# Patient Record
Sex: FEMALE | Race: WHITE | Employment: OTHER | URBAN - METROPOLITAN AREA
[De-identification: names, ages, dates, MRNs, and addresses within clinical notes are randomized per-mention and may not be internally consistent; named-entity substitution may affect disease eponyms.]

---

## 2019-04-23 ENCOUNTER — OFFICE VISIT (OUTPATIENT)
Dept: INTERNAL MEDICINE CLINIC | Facility: CLINIC | Age: 59
End: 2019-04-23

## 2019-04-23 VITALS
HEART RATE: 78 BPM | HEIGHT: 66 IN | RESPIRATION RATE: 16 BRPM | DIASTOLIC BLOOD PRESSURE: 68 MMHG | WEIGHT: 111.9 LBS | BODY MASS INDEX: 17.98 KG/M2 | TEMPERATURE: 98.4 F | SYSTOLIC BLOOD PRESSURE: 99 MMHG

## 2019-04-23 DIAGNOSIS — F10.11 HISTORY OF ALCOHOL ABUSE: ICD-10-CM

## 2019-04-23 DIAGNOSIS — Z87.81 HISTORY OF FRACTURE OF RIGHT HIP: ICD-10-CM

## 2019-04-23 DIAGNOSIS — G25.71 AKATHISIA: ICD-10-CM

## 2019-04-23 DIAGNOSIS — Z86.718 HISTORY OF DVT (DEEP VEIN THROMBOSIS): Primary | ICD-10-CM

## 2019-04-23 DIAGNOSIS — F31.9 BIPOLAR 1 DISORDER (HCC): ICD-10-CM

## 2019-04-23 DIAGNOSIS — G25.2 RESTING TREMOR: ICD-10-CM

## 2019-04-23 DIAGNOSIS — I10 ESSENTIAL HYPERTENSION: ICD-10-CM

## 2019-04-23 RX ORDER — FAMOTIDINE 20 MG/1
20 TABLET, FILM COATED ORAL 2 TIMES DAILY
COMMUNITY
End: 2019-04-23

## 2019-04-23 RX ORDER — FOLIC ACID 1 MG/1
TABLET ORAL DAILY
COMMUNITY
End: 2019-04-25 | Stop reason: SDUPTHER

## 2019-04-23 RX ORDER — GABAPENTIN 100 MG/1
200 CAPSULE ORAL 3 TIMES DAILY
COMMUNITY
End: 2019-04-25 | Stop reason: SDUPTHER

## 2019-04-23 RX ORDER — CITALOPRAM 10 MG/1
TABLET ORAL DAILY
COMMUNITY

## 2019-04-23 RX ORDER — METOPROLOL TARTRATE 25 MG/1
TABLET, FILM COATED ORAL 2 TIMES DAILY
COMMUNITY
End: 2019-05-09 | Stop reason: SDUPTHER

## 2019-04-23 RX ORDER — CARBIDOPA AND LEVODOPA 10; 100 MG/1; MG/1
1 TABLET ORAL 3 TIMES DAILY
COMMUNITY
End: 2019-04-25 | Stop reason: SDUPTHER

## 2019-04-23 RX ORDER — ALPRAZOLAM 0.5 MG/1
TABLET ORAL 2 TIMES DAILY
COMMUNITY
End: 2019-08-15

## 2019-04-23 RX ORDER — METHOCARBAMOL 500 MG/1
TABLET, FILM COATED ORAL 2 TIMES DAILY
COMMUNITY
End: 2019-04-25 | Stop reason: SDUPTHER

## 2019-04-23 RX ORDER — BENZTROPINE MESYLATE 0.5 MG/1
0.5 TABLET ORAL
COMMUNITY
End: 2022-06-17 | Stop reason: ALTCHOICE

## 2019-04-23 RX ORDER — PRAZOSIN HYDROCHLORIDE 2 MG/1
2 CAPSULE ORAL
COMMUNITY
End: 2019-08-15

## 2019-04-23 RX ORDER — LAMOTRIGINE 25 MG/1
75 TABLET ORAL DAILY
COMMUNITY

## 2019-04-23 RX ORDER — PALIPERIDONE 1.5 MG/1
1.5 TABLET, EXTENDED RELEASE ORAL
COMMUNITY
End: 2020-10-19

## 2019-04-23 RX ORDER — LANOLIN ALCOHOL/MO/W.PET/CERES
CREAM (GRAM) TOPICAL DAILY
COMMUNITY
End: 2019-04-25 | Stop reason: SDUPTHER

## 2019-04-23 NOTE — PROGRESS NOTES
Subjective:  
  
Soco Valera is a 60 y.o. female who is a new patient and is here to establish care. Previous followed by [facility]. The following sections were reviewed & updated as appropriate: PMH, PL, PSH, FH, RxH, and SH. History of DVT, HTN. She was seeing the [address] [place] [address]. Broken hip: she broke her hip in January. She is seeing orthopedist [address] [place] [address] in May Dr. Kian Ruiz. She is taking xarelto 20mg. Parkinson like symptoms: she was put on benztropine and carbidopa-levodopa. She sees neurology Dr. Selene Newberry neurology May 13th. Alcohol abuse: she is taking vitamin B and folic acid. She states her last alcohol was a month ago. She has been started on prazosin for sleep issues. Shingles: she had nerve damage in her from shingles. She has been put on the gabapentin to control the nerve pain from this. Mental Health Review Patient is seen for bipolar. She sees  with [facility]. She is getting invega, celexa, lamictal, xanax from her. Patient Active Problem List  
 Diagnosis Date Noted  Bipolar 1 disorder ([facility] [address] 04/23/2019  
 History of fracture of right hip 04/23/2019  
 History of alcohol abuse 04/23/2019  Akathisia 04/23/2019  Resting tremor 04/23/2019  
 History of DVT (deep vein thrombosis) 04/23/2019  Drug-induced mood disorder(292.84) 06/14/2015  Shingles 06/14/2015  Hypertension 06/14/2015  Rosacea 06/24/2014  Alcohol dependence ([facility] [address] 06/23/2014  Personality disorder ([facility] [address] 06/23/2014  Non-compliance with treatment 06/23/2014 Current Outpatient Medications Medication Sig Dispense Refill  gabapentin (NEURONTIN) 100 mg capsule Take 200 mg by mouth three (3) times daily.  citalopram (CELEXA) 10 mg tablet Take  by mouth daily.  prazosin (MINIPRESS) 2 mg capsule Take 2 mg by mouth nightly.  carbidopa-levodopa (SINEMET)  mg per tablet Take 1 Tab by mouth three (3) times daily.  benztropine (COGENTIN) 0.5 mg tablet Take 0.5 mg by mouth nightly.  folic acid (FOLVITE) 1 mg tablet Take  by mouth daily.  methocarbamol (ROBAXIN) 500 mg tablet Take  by mouth two (2) times a day.  rivaroxaban (XARELTO) 20 mg tab tablet Take  by mouth daily.  metoprolol tartrate (LOPRESSOR) 25 mg tablet Take  by mouth two (2) times a day.  paliperidone (INVEGA) 1.5 mg ER tablet Take 3 mg by mouth nightly.  lamoTRIgine (LAMICTAL) 25 mg tablet Take  by mouth two (2) times a day.  thiamine HCL (VITAMIN B-1) 100 mg tablet Take  by mouth daily.  ALPRAZolam (XANAX) 0.5 mg tablet Take  by mouth two (2) times a day.  amitriptyline (ELAVIL) 25 mg tablet Take 1 Tab by mouth nightly for 15 days. Indications: NEUROPATHIC PAIN 15 Tab 0  
 hydrochlorothiazide (HYDRODIURIL) 25 mg tablet Take 1 Tab by mouth daily for 15 days. Indications: HYPERTENSION 15 Tab 0  
 potassium chloride SR 20 mEq TbER Take 20 mEq by mouth daily for 15 days. Indications: HYPOKALEMIA PREVENTION 14 Tab 0  prednisoLONE acetate (PRED FORTE) 1 % ophthalmic suspension Administer 1 Drop to left eye four (4) times daily for 7 days. Indications: SEVERE OCULAR INFLAMMATION, Shingles left eye 15 mL 0  
 predniSONE (DELTASONE) 10 mg tablet Take 1 Tab by mouth two (2) times daily (with meals) for 6 doses. Indications: Shigles left eye 6 Tab 0 Allergies Allergen Reactions  Peanut Other (comments) Past Medical History:  
Diagnosis Date  Akathisia  Bipolar 1 disorder ([facility] [address] [facility]  DVT (deep venous thrombosis) ([facility] [address] [facility]  ETOH abuse  Hypertension  Shingles Past Surgical History:  
Procedure Laterality Date  HX ORTHOPAEDIC Broken hip R/Neck surgery. Review of Systems A comprehensive review of systems was negative except for that written in the HPI. Objective:  
 
Visit Vitals BP 99/68 Pulse 78 Temp 98.4 °F (36.9 °C) (Oral) Resp 16 Ht 5' 6\" (1.676 m) Wt 111 lb 14.4 oz (50.8 kg) BMI 18.06 kg/m² General appearance: alert, cooperative, no distress, appears stated age Head: Normocephalic, without obvious abnormality, atraumatic Eyes: negative Lungs: clear to auscultation bilaterally Heart: regular rate and rhythm, S1, S2 normal, no murmur, click, rub or gallop Extremities: extremities normal, atraumatic, no cyanosis or edema Pulses: 2+ and symmetric Skin: Skin color, texture, turgor normal. No rashes or lesions Neurologic: Alert and oriented X 3, normal strength and tone. Ambulation with walker, resting tremors in bilateral legs Psych: appropriate mood, speech, affect Nursing note and vitals reviewed Assessment/Plan: ICD-10-CM ICD-9-CM 1. History of DVT (deep vein thrombosis) Z86.718 V12.51 REFERRAL TO HEMATOLOGY 2. Resting tremor R25.9 781.0 3. Akathisia G25.71 781.0 4. Essential hypertension I10 401.9 5. History of fracture of right hip Z87.81 V15.51   
6. History of alcohol abuse Z87.898 305.03   
7. Bipolar 1 disorder (HCC) F31.9 296.7 She will follow up with neurology, heme, and ortho. Will get records . Follow-up and Dispositions · Return for As needed, Please sign record release forms at . Advised her to call back or return to office if symptoms worsen/change/persist. 
Discussed expected course/resolution/complications of diagnosis in detail with patient. Medication risks/benefits/costs/interactions/alternatives discussed with patient. She was given an after visit summary which includes diagnoses, current medications, & vitals. She expressed understanding with the diagnosis and plan.

## 2019-04-23 NOTE — PROGRESS NOTES
Chief Complaint Patient presents with [facility] Establish Care 1. Have you been to the ER, urgent care clinic since your last visit? Hospitalized since your last visit? No 
 
2. Have you seen or consulted any other health care providers outside of the [address] [place] [address] since your last visit? Include any pap smears or colon screening.  No

## 2019-04-25 DIAGNOSIS — F10.11 HISTORY OF ALCOHOL ABUSE: Primary | ICD-10-CM

## 2019-04-25 DIAGNOSIS — G25.71 AKATHISIA: ICD-10-CM

## 2019-04-25 RX ORDER — GABAPENTIN 100 MG/1
200 CAPSULE ORAL 3 TIMES DAILY
Qty: 270 CAP | Refills: 5 | Status: SHIPPED | OUTPATIENT
Start: 2019-04-25 | End: 2019-08-22 | Stop reason: SDUPTHER

## 2019-04-25 RX ORDER — CARBIDOPA AND LEVODOPA 10; 100 MG/1; MG/1
1 TABLET ORAL 3 TIMES DAILY
Qty: 15 TAB | Refills: 0 | Status: SHIPPED | OUTPATIENT
Start: 2019-04-25 | End: 2019-08-15 | Stop reason: ALTCHOICE

## 2019-04-25 RX ORDER — LANOLIN ALCOHOL/MO/W.PET/CERES
100 CREAM (GRAM) TOPICAL DAILY
Qty: 90 TAB | Refills: 3 | Status: SHIPPED | OUTPATIENT
Start: 2019-04-25 | End: 2019-12-18 | Stop reason: SDUPTHER

## 2019-04-25 RX ORDER — FOLIC ACID 1 MG/1
1 TABLET ORAL DAILY
Qty: 90 TAB | Refills: 3 | Status: SHIPPED | OUTPATIENT
Start: 2019-04-25 | End: 2019-12-18 | Stop reason: SDUPTHER

## 2019-04-25 RX ORDER — METHOCARBAMOL 500 MG/1
500 TABLET, FILM COATED ORAL 2 TIMES DAILY
Qty: 10 TAB | Refills: 0 | Status: SHIPPED | OUTPATIENT
Start: 2019-04-25 | End: 2019-08-15

## 2019-05-06 ENCOUNTER — OFFICE VISIT (OUTPATIENT)
Dept: ONCOLOGY | Age: 59
End: 2019-05-06

## 2019-05-06 ENCOUNTER — PATIENT MESSAGE (OUTPATIENT)
Dept: ONCOLOGY | Age: 59
End: 2019-05-06

## 2019-05-06 VITALS
RESPIRATION RATE: 18 BRPM | SYSTOLIC BLOOD PRESSURE: 83 MMHG | BODY MASS INDEX: 18 KG/M2 | WEIGHT: 112 LBS | HEART RATE: 85 BPM | OXYGEN SATURATION: 97 % | TEMPERATURE: 97.7 F | DIASTOLIC BLOOD PRESSURE: 59 MMHG | HEIGHT: 66 IN

## 2019-05-06 DIAGNOSIS — Z86.718 HISTORY OF DVT (DEEP VEIN THROMBOSIS): Primary | ICD-10-CM

## 2019-05-06 DIAGNOSIS — I82.401 LEG DVT (DEEP VENOUS THROMBOEMBOLISM), ACUTE, RIGHT (HCC): Primary | ICD-10-CM

## 2019-05-06 NOTE — PROGRESS NOTES
Identified pt with two pt identifiers(name and ). Reviewed record in preparation for visit and have obtained necessary documentation. Chief Complaint   Patient presents with    Blood Clot      Visit Vitals  BP (!) 83/59 (BP 1 Location: Left arm, BP Patient Position: Sitting)   Pulse 85   Temp 97.7 °F (36.5 °C) (Oral)   Resp 18   Ht 5' 6\" (1.676 m)   Wt 112 lb (50.8 kg)   SpO2 97%   BMI 18.08 kg/m²         Health Maintenance Due   Topic    Hepatitis C Screening     Pneumococcal 0-64 years (1 of 1 - PPSV23)    DTaP/Tdap/Td series (1 - Tdap)    PAP AKA CERVICAL CYTOLOGY     Shingrix Vaccine Age 50> (1 of 2)    BREAST CANCER SCRN MAMMOGRAM     FOBT Q 1 YEAR AGE 50-75        Coordination of Care Questionnaire:  :   1) Have you been to an emergency room, urgent care, or hospitalized since your last visit? If yes, where when, and reason for visit? no       2. Have seen or consulted any other health care provider since your last visit? If yes, where when, and reason for visit? NO      3) Do you have an Advanced Directive/ Living Will in place? NO  If yes, do we have a copy on file NO  If no, would you like information NO    Patient is accompanied by  I have received verbal consent from [facility] to discuss any/all medical information while they are present in the room.

## 2019-05-07 DIAGNOSIS — I82.401 LEG DVT (DEEP VENOUS THROMBOEMBOLISM), ACUTE, RIGHT (HCC): Primary | ICD-10-CM

## 2019-05-07 NOTE — PROGRESS NOTES
Hematology Consultation        Patient: Deja Gupta MRN: [id number]  SSN: [id number]    YOB: 1960  Age: 60 y.o. Sex: female      Subjective:      Deja Gupta is a 60 y.o. female who I am seeing in consultation for a recent diagnosis of acute DVT RLE. She fell and laid in the ground for 2 days at home. She was found to have right femoral fracture and an acute DVT in the right leg. She underwent a ORIF and has been on Rivaroxaban since. She denies bleeding. She has chronic pain in the right leg radiating to the knee. Review of Systems:    Constitutional: negative  Eyes: negative  Ears, Nose, Mouth, Throat, and Face: negative  Respiratory: negative  Cardiovascular: negative  Gastrointestinal: negative  Genitourinary:negative  Integument/Breast: negative  Hematologic/Lymphatic: negative  Musculoskeletal:negative  Neurological: negative          Past Medical History:   Diagnosis Date    Akathisia     Bipolar 1 disorder ([address]     DVT (deep venous thrombosis) (HCC)     ETOH abuse     Hypertension     Shingles      Past Surgical History:   Procedure Laterality Date    HX ORTHOPAEDIC      Broken hip R/Neck surgery. Family History   Problem Relation Age of Onset    Diabetes Mother      Social History     Tobacco Use    Smoking status: Never Smoker    Smokeless tobacco: Never Used   Substance Use Topics    Alcohol use: Yes      Prior to Admission medications    Medication Sig Start Date End Date Taking? Authorizing Provider   carbidopa-levodopa (SINEMET)  mg per tablet Take 1 Tab by mouth three (3) times daily. 4/25/19  Yes JuanffTobias, NP   methocarbamol (ROBAXIN) 500 mg tablet Take 1 Tab by mouth two (2) times a day. 4/25/19  Yes Skiff, Floyce Roots, NP   folic acid (FOLVITE) 1 mg tablet Take 1 Tab by mouth daily. 4/25/19  Yes Juanff Floycmonica Roots, NP   gabapentin (NEURONTIN) 100 mg capsule Take 2 Caps by mouth three (3) times daily.  4/25/19  Yes Skiff, James Choi NP   thiamine HCL (VITAMIN B-1) 100 mg tablet Take 1 Tab by mouth daily. 4/25/19  Yes Skiff, Denette Bank, NP   citalopram (CELEXA) 10 mg tablet Take  by mouth daily. Yes Provider, Historical   prazosin (MINIPRESS) 2 mg capsule Take 2 mg by mouth nightly. Yes Provider, Historical   benztropine (COGENTIN) 0.5 mg tablet Take 0.5 mg by mouth nightly. Yes Provider, Historical   metoprolol tartrate (LOPRESSOR) 25 mg tablet Take  by mouth two (2) times a day. Yes Provider, Historical   paliperidone (INVEGA) 1.5 mg ER tablet Take 3 mg by mouth nightly. Yes Provider, Historical   lamoTRIgine (LAMICTAL) 25 mg tablet Take  by mouth two (2) times a day. Yes Provider, Historical   ALPRAZolam (XANAX) 0.5 mg tablet Take  by mouth two (2) times a day. Yes Provider, Historical   rivaroxaban (XARELTO) 20 mg tab tablet Take  by mouth daily. Provider, Historical   amitriptyline (ELAVIL) 25 mg tablet Take 1 Tab by mouth nightly for 15 days. Indications: NEUROPATHIC PAIN 6/17/15 7/2/15  Shela Curling, MD   hydrochlorothiazide (HYDRODIURIL) 25 mg tablet Take 1 Tab by mouth daily for 15 days. Indications: HYPERTENSION 6/17/15 7/2/15  Shela Curling, MD   potassium chloride SR 20 mEq TbER Take 20 mEq by mouth daily for 15 days. Indications: HYPOKALEMIA PREVENTION 6/17/15 7/2/15  Shela Curling, MD   prednisoLONE acetate (PRED FORTE) 1 % ophthalmic suspension Administer 1 Drop to left eye four (4) times daily for 7 days. Indications: SEVERE OCULAR INFLAMMATION, Shingles left eye 6/17/15 6/24/15  Shela Curling, MD   predniSONE (DELTASONE) 10 mg tablet Take 1 Tab by mouth two (2) times daily (with meals) for 6 doses.  Indications: Shigles left eye 6/17/15 6/20/15  Shela Curling, MD              Allergies   Allergen Reactions    Peanut Other (comments)           Objective:     Vitals:    05/06/19 1343   BP: (!) 83/59   Pulse: 85   Resp: 18   Temp: 97.7 °F (36.5 °C)   TempSrc: Oral   SpO2: 97% Weight: 112 lb (50.8 kg)   Height: 5' 6\" (1.676 m)            Physical Exam:    GENERAL: alert, cooperative, no distress, appears stated age  EYE: conjunctivae/corneas clear. PERRL, EOM's intact. Fundi benign  LYMPHATIC: Cervical, supraclavicular, and axillary nodes normal.   THROAT & NECK: normal and no erythema or exudates noted. LUNG: clear to auscultation bilaterally  HEART: regular rate and rhythm, S1, S2 normal, no murmur, click, rub or gallop  ABDOMEN: soft, non-tender. Bowel sounds normal. No masses,  no organomegaly  EXTREMITIES:  extremities normal, atraumatic, no cyanosis or edema  SKIN: Normal.  NEUROLOGIC: AOx3. Gait normal. Reflexes and motor strength normal and symmetric. Cranial nerves 2-12 and sensation grossly intact. Assessment:     1. Acute DVT RLE, Feb 2019    Provoked from right femoral fracture resulting in immobility. She has been in systemic anticoagulation for close to three months. The risk of second event in the next 10 years is around 20%. The greatest risk is in the first 3 months (around 10-15%). In the subsequent years, the risk is about 2-3% every year. ACCP guidelines for management of patients with VTE allows individualization of therapy choices. I had an extended risk benefit discussions with the patient. I will obtain a repeat doppler RLE. If the doppler shows no residual clot, I will discontinue systemic anticoagulation and start an ASA 81 daily. Plan:       1. RLE Doppler  2. Return as needed      Signed by: Alber Martinez MD                     May 7, 2019       CC.  Debbie Boeck, NP

## 2019-05-09 DIAGNOSIS — I10 ESSENTIAL HYPERTENSION: Primary | ICD-10-CM

## 2019-05-09 RX ORDER — METOPROLOL TARTRATE 25 MG/1
25 TABLET, FILM COATED ORAL 2 TIMES DAILY
Qty: 60 TAB | Refills: 5 | Status: SHIPPED | OUTPATIENT
Start: 2019-05-09 | End: 2019-12-18 | Stop reason: DRUGHIGH

## 2019-05-14 ENCOUNTER — HOSPITAL ENCOUNTER (OUTPATIENT)
Dept: VASCULAR SURGERY | Age: 59
Discharge: HOME | End: 2019-05-14
Attending: INTERNAL MEDICINE
Payer: COMMERCIAL

## 2019-05-14 DIAGNOSIS — I82.401 LEG DVT (DEEP VENOUS THROMBOEMBOLISM), ACUTE, RIGHT (HCC): ICD-10-CM

## 2019-05-14 PROCEDURE — 93971 EXTREMITY STUDY: CPT

## 2019-06-25 DIAGNOSIS — Z86.718 HISTORY OF DVT (DEEP VEIN THROMBOSIS): ICD-10-CM

## 2019-07-03 NOTE — TELEPHONE ENCOUNTER
Per Gela Shah from Dr. Jeramy Godoy RIVAROXABAN (Demetrius Hankins) 20 MG TAB. TAKE ONE TAB BY MOUTH DAILY.  QUANTITY 30 REFILL 0

## 2019-07-09 DIAGNOSIS — Z86.718 HISTORY OF DVT (DEEP VEIN THROMBOSIS): ICD-10-CM

## 2019-07-10 ENCOUNTER — TELEPHONE (OUTPATIENT)
Dept: ONCOLOGY | Age: 59
End: 2019-07-10

## 2019-07-10 NOTE — TELEPHONE ENCOUNTER
Patient informed Dr MARIN changed her Xarelto dosage to 10 mg daily and script was sent to her pharmacy. Patient verbalized understanding.

## 2019-08-02 ENCOUNTER — OFFICE VISIT (OUTPATIENT)
Dept: ONCOLOGY | Age: 59
End: 2019-08-02

## 2019-08-02 VITALS
OXYGEN SATURATION: 96 % | DIASTOLIC BLOOD PRESSURE: 93 MMHG | TEMPERATURE: 98.6 F | WEIGHT: 101 LBS | BODY MASS INDEX: 16.23 KG/M2 | RESPIRATION RATE: 18 BRPM | HEIGHT: 66 IN | SYSTOLIC BLOOD PRESSURE: 149 MMHG | HEART RATE: 109 BPM

## 2019-08-02 DIAGNOSIS — Z86.718 HISTORY OF DVT (DEEP VEIN THROMBOSIS): Primary | ICD-10-CM

## 2019-08-02 NOTE — PROGRESS NOTES
Follow-up Note      Patient: Ginny Duncan MRN: [id number]  SSN: [id number]    YOB: 1960  Age: 60 y.o. Sex: female      Subjective:      Ginny Duncan is a 60 y.o. female who I am seeing for a follow up of previous history of acute DVT RLE. She fell and laid in the ground for 2 days at home. She was found to have right femoral fracture and an acute DVT in the right leg. She underwent a ORIF and has been on Rivaroxaban since. She denies bleeding. She has now completed 6 months of systemic anticoagulation. Review of Systems:    Constitutional: negative  Eyes: negative  Ears, Nose, Mouth, Throat, and Face: negative  Respiratory: negative  Cardiovascular: negative  Gastrointestinal: negative  Genitourinary:negative  Integument/Breast: negative  Hematologic/Lymphatic: negative  Musculoskeletal: negative  Neurological: negative        Past Medical History:   Diagnosis Date    Akathisia     Bipolar 1 disorder ([address]     DVT (deep venous thrombosis) (HCC)     ETOH abuse     Hypertension     Shingles      Past Surgical History:   Procedure Laterality Date    HX ORTHOPAEDIC      Broken hip R/Neck surgery. Family History   Problem Relation Age of Onset    Diabetes Mother      Social History     Tobacco Use    Smoking status: Never Smoker    Smokeless tobacco: Never Used   Substance Use Topics    Alcohol use: Yes      Prior to Admission medications    Medication Sig Start Date End Date Taking? Authorizing Provider   rivaroxaban (XARELTO) 10 mg tablet Take 1 Tab by mouth daily. Indications: blood clot in a deep vein of the extremities  Patient taking differently: Take 20 mg by mouth daily. Indications: blood clot in a deep vein of the extremities 7/9/19  Yes Karla Garcia MD   metoprolol tartrate (LOPRESSOR) 25 mg tablet Take 1 Tab by mouth two (2) times a day. 5/9/19  Yes Skiff, Kenna Bollard, NP   gabapentin (NEURONTIN) 100 mg capsule Take 2 Caps by mouth three (3) times daily. 4/25/19  Yes Skiff, Adonis Abbott, NP   thiamine HCL (VITAMIN B-1) 100 mg tablet Take 1 Tab by mouth daily. 4/25/19  Yes Skiff, Adonis Abbott, NP   citalopram (CELEXA) 10 mg tablet Take  by mouth daily. Yes Provider, Historical   benztropine (COGENTIN) 0.5 mg tablet Take 0.5 mg by mouth nightly. Yes Provider, Historical   paliperidone (INVEGA) 1.5 mg ER tablet Take 3 mg by mouth nightly. Yes Provider, Historical   lamoTRIgine (LAMICTAL) 25 mg tablet Take  by mouth two (2) times a day. Yes Provider, Historical   carbidopa-levodopa (SINEMET)  mg per tablet Take 1 Tab by mouth three (3) times daily. 4/25/19   Skiff, Adonis Abbott, NP   methocarbamol (ROBAXIN) 500 mg tablet Take 1 Tab by mouth two (2) times a day. 4/25/19   Skiff, Adonis Abbott, NP   folic acid (FOLVITE) 1 mg tablet Take 1 Tab by mouth daily. 4/25/19   Skiff, Adonis Abbott, NP   prazosin (MINIPRESS) 2 mg capsule Take 2 mg by mouth nightly. Provider, Historical   ALPRAZolam (XANAX) 0.5 mg tablet Take  by mouth two (2) times a day. Provider, Historical   amitriptyline (ELAVIL) 25 mg tablet Take 1 Tab by mouth nightly for 15 days. Indications: NEUROPATHIC PAIN 6/17/15 7/2/15  Angela Meraz MD   hydrochlorothiazide (HYDRODIURIL) 25 mg tablet Take 1 Tab by mouth daily for 15 days. Indications: HYPERTENSION 6/17/15 7/2/15  Angela Meraz MD   potassium chloride SR 20 mEq TbER Take 20 mEq by mouth daily for 15 days. Indications: HYPOKALEMIA PREVENTION 6/17/15 7/2/15  Angela Meraz MD   prednisoLONE acetate (PRED FORTE) 1 % ophthalmic suspension Administer 1 Drop to left eye four (4) times daily for 7 days. Indications: SEVERE OCULAR INFLAMMATION, Shingles left eye 6/17/15 6/24/15  Angela Meraz MD   predniSONE (DELTASONE) 10 mg tablet Take 1 Tab by mouth two (2) times daily (with meals) for 6 doses.  Indications: Shigles left eye 6/17/15 6/20/15  Angela Meraz MD          Allergies   Allergen Reactions    Chocolate [Cocoa] Hives    Peanut Other (comments)           Objective:     Visit Vitals  BP (!) 149/93 (BP 1 Location: Left arm, BP Patient Position: Sitting)   Pulse (!) 109   Temp 98.6 °F (37 °C) (Oral)   Resp 18   Ht 5' 6\" (1.676 m)   Wt 101 lb (45.8 kg)   SpO2 96%   BMI 16.30 kg/m²       Pain Scale: 0 - No pain/10  Pain Location:         Physical Exam:    GENERAL: alert, cooperative, no distress, appears stated age  EYE: conjunctivae/corneas clear. PERRL, EOM's intact. Fundi benign  LYMPHATIC: Cervical, supraclavicular, and axillary nodes normal.   THROAT & NECK: normal and no erythema or exudates noted. LUNG: clear to auscultation bilaterally  HEART: regular rate and rhythm, S1, S2 normal, no murmur, click, rub or gallop  ABDOMEN: soft, non-tender. Bowel sounds normal. No masses,  no organomegaly  EXTREMITIES:  extremities normal, atraumatic, no cyanosis or edema  SKIN: Normal.  NEUROLOGIC: AOx3. Gait normal. Reflexes and motor strength normal and symmetric. Cranial nerves 2-12 and sensation grossly intact. Assessment:     1. Acute DVT RLE, Feb 2019    Provoked from right femoral fracture resulting in immobility. She has been on systemic anticoagulation for close to three months. The risk of second event in the next 10 years is around 20%. The greatest risk is in the first 3 months (around 10-15%). In the subsequent years, the risk is about 2-3% every year. ACCP guidelines for management of patients with VTE allows individualization of therapy choices. Repeat RLE doppler (5/14/2019): Partial thrombosis of the right mid to distal femoral vein, and the right popliteal vein. On rivaroxaban po 10 mg daily  Has now completed 6 months of systemic anticoagulation with Rivaroxaban. Stop Rivaroxaban  Start ASA 81 mg po daily    Symptom management form reviewed with patient.       Plan:     > Stop Rivaroxaban  > Start ASA 81 mg po daily  > Follow-up as needed      Signed by: Rishabh Daniel MD August 4, 2019        CC.  Regina Mann, NP

## 2019-08-02 NOTE — PROGRESS NOTES
Brooklyn Castro is a 60 y.o. female here today for DVT RLE f/u. Patient taking Xarelto. Elevated B/P and pulse noted; pt confirms taking B/P medication today; pt report she might need to start back taking her B/P medication twice a day; pt will contact her PCP to make them aware of her elevated B/P. other VS stable. Patient denies pain. Patient denies SOB. Visit Vitals  BP (!) 149/93 (BP 1 Location: Left arm, BP Patient Position: Sitting)   Pulse (!) 109   Temp 98.6 °F (37 °C) (Oral)   Resp 18   Ht 5' 6\" (1.676 m)   Wt 101 lb (45.8 kg)   SpO2 96%   BMI 16.30 kg/m²       Pain Scale: 0 - No pain/10  Pain Location:     1. Have you been to the ER, urgent care clinic since your last visit? Hospitalized since your last visit? No    2. Have you seen or consulted any other health care providers outside of the [address] [place] [address] since your last visit? Include any pap smears or colon screening. No     Health Maintenance Review: Patient reminded of \"due or due soon\" health maintenance. I have asked the patient to contact his/her primary care provider (PCP) for follow-up on his/her health maintenance.

## 2019-08-15 ENCOUNTER — OFFICE VISIT (OUTPATIENT)
Dept: INTERNAL MEDICINE CLINIC | Facility: CLINIC | Age: 59
End: 2019-08-15

## 2019-08-15 VITALS
HEIGHT: 66 IN | HEART RATE: 69 BPM | OXYGEN SATURATION: 99 % | RESPIRATION RATE: 16 BRPM | SYSTOLIC BLOOD PRESSURE: 125 MMHG | WEIGHT: 106 LBS | TEMPERATURE: 98 F | DIASTOLIC BLOOD PRESSURE: 85 MMHG | BODY MASS INDEX: 17.04 KG/M2

## 2019-08-15 DIAGNOSIS — I10 ESSENTIAL HYPERTENSION: Primary | ICD-10-CM

## 2019-08-15 DIAGNOSIS — E55.9 VITAMIN D DEFICIENCY: ICD-10-CM

## 2019-08-15 DIAGNOSIS — Z00.00 ROUTINE PHYSICAL EXAMINATION: ICD-10-CM

## 2019-08-15 DIAGNOSIS — Z86.718 HISTORY OF DVT (DEEP VEIN THROMBOSIS): ICD-10-CM

## 2019-08-15 DIAGNOSIS — G25.2 RESTING TREMOR: ICD-10-CM

## 2019-08-15 DIAGNOSIS — G25.71 AKATHISIA: ICD-10-CM

## 2019-08-15 RX ORDER — ASPIRIN 81 MG/1
TABLET ORAL DAILY
COMMUNITY
End: 2020-09-30 | Stop reason: ALTCHOICE

## 2019-08-15 RX ORDER — CALCIUM CARBONATE/VITAMIN D3 600 MG-10
TABLET ORAL
Refills: 99 | COMMUNITY
Start: 2019-08-01 | End: 2019-08-15 | Stop reason: SDUPTHER

## 2019-08-15 NOTE — PROGRESS NOTES
Subjective:      Marie Duran is a 60 y.o. female who presents today for follow up. Cardiovascular Review  The patient has hypertension. Since last visit: she notes her pressure was elevated when she was seen by Dr. Jhonatan Trimble, she states she frequently feels dizzy and was being seen by psychiatry for this. She reports taking medications as instructed, no medication side effects noted, no TIA's, no chest pain on exertion, no dyspnea on exertion, no swelling of ankles, no palpitations. Diet and Lifestyle: generally follows a low fat low cholesterol diet, generally follows a low sodium diet, no formal exercise but active during the day. Labs: no lab studies available for review at time of visit. BP Readings from Last 3 Encounters:   08/15/19 125/85   08/02/19 (!) 149/93   05/06/19 (!) 83/59     Neurology: she has been seeing a neurologist, a CT was done at [facility] that shows possible explanation for dizziness (focal atrophy? ? )- records needed. EMG done of bilateral hands and found significant disease, she has been referred to a hand specialist.     Psych: she is planning to ween off the invega when her psychiatrist returns from maternity leave. She is curious to know if that will help with her pressures. She will see her next in September    Body mass index is 17.11 kg/m².   Wt Readings from Last 3 Encounters:   08/15/19 106 lb (48.1 kg)   08/02/19 101 lb (45.8 kg)   05/06/19 112 lb (50.8 kg)         Patient Active Problem List    Diagnosis Date Noted    Bipolar 1 disorder ([facility] [address] 04/23/2019    History of fracture of right hip 04/23/2019    History of alcohol abuse 04/23/2019    Akathisia 04/23/2019    Resting tremor 04/23/2019    History of DVT (deep vein thrombosis) 04/23/2019    Drug-induced mood disorder(292.84) 06/14/2015    Shingles 06/14/2015    Hypertension 06/14/2015    Rosacea 06/24/2014    Alcohol dependence ([address] 06/23/2014    Personality disorder ([facility] [address] 06/23/2014    Non-compliance with treatment 06/23/2014     Current Outpatient Medications   Medication Sig Dispense Refill    rivaroxaban (XARELTO) 10 mg tablet Take 1 Tab by mouth daily. Indications: blood clot in a deep vein of the extremities (Patient taking differently: Take 20 mg by mouth daily. Indications: blood clot in a deep vein of the extremities) 30 Tab 5    metoprolol tartrate (LOPRESSOR) 25 mg tablet Take 1 Tab by mouth two (2) times a day. 60 Tab 5    carbidopa-levodopa (SINEMET)  mg per tablet Take 1 Tab by mouth three (3) times daily. 15 Tab 0    methocarbamol (ROBAXIN) 500 mg tablet Take 1 Tab by mouth two (2) times a day. 10 Tab 0    folic acid (FOLVITE) 1 mg tablet Take 1 Tab by mouth daily. 90 Tab 3    gabapentin (NEURONTIN) 100 mg capsule Take 2 Caps by mouth three (3) times daily. 270 Cap 5    thiamine HCL (VITAMIN B-1) 100 mg tablet Take 1 Tab by mouth daily. 90 Tab 3    citalopram (CELEXA) 10 mg tablet Take  by mouth daily.  prazosin (MINIPRESS) 2 mg capsule Take 2 mg by mouth nightly.  benztropine (COGENTIN) 0.5 mg tablet Take 0.5 mg by mouth nightly.  paliperidone (INVEGA) 1.5 mg ER tablet Take 3 mg by mouth nightly.  lamoTRIgine (LAMICTAL) 25 mg tablet Take  by mouth two (2) times a day.  ALPRAZolam (XANAX) 0.5 mg tablet Take  by mouth two (2) times a day.  amitriptyline (ELAVIL) 25 mg tablet Take 1 Tab by mouth nightly for 15 days. Indications: NEUROPATHIC PAIN 15 Tab 0    hydrochlorothiazide (HYDRODIURIL) 25 mg tablet Take 1 Tab by mouth daily for 15 days. Indications: HYPERTENSION 15 Tab 0    potassium chloride SR 20 mEq TbER Take 20 mEq by mouth daily for 15 days. Indications: HYPOKALEMIA PREVENTION 14 Tab 0    prednisoLONE acetate (PRED FORTE) 1 % ophthalmic suspension Administer 1 Drop to left eye four (4) times daily for 7 days.  Indications: SEVERE OCULAR INFLAMMATION, Shingles left eye 15 mL 0    predniSONE (DELTASONE) 10 mg tablet Take 1 Tab by mouth two (2) times daily (with meals) for 6 doses. Indications: Shigles left eye 6 Tab 0     Allergies   Allergen Reactions    Chocolate [Cocoa] Hives    Peanut Other (comments)     Past Medical History:   Diagnosis Date    Akathisia     Bipolar 1 disorder ([address]     DVT (deep venous thrombosis) (HCC)     ETOH abuse     Hypertension     Shingles      Past Surgical History:   Procedure Laterality Date    HX ORTHOPAEDIC      Broken hip R/Neck surgery. Review of Systems    A comprehensive review of systems was negative except for that written in the HPI. Objective:     Visit Vitals  /85 (BP 1 Location: Left arm, BP Patient Position: Sitting)   Pulse 69   Temp 98 °F (36.7 °C) (Oral)   Resp 16   Ht 5' 6\" (1.676 m)   Wt 106 lb (48.1 kg)   SpO2 99%   BMI 17.11 kg/m²     General appearance: alert, cooperative, no distress, appears stated age  Head: Normocephalic, without obvious abnormality, atraumatic  Eyes: negative  Back: symmetric, no curvature. ROM normal. No CVA tenderness. Lungs: clear to auscultation bilaterally  Heart: regular rate and rhythm, S1, S2 normal, no murmur, click, rub or gallop  Extremities: extremities normal, atraumatic, no cyanosis or edema. Equal strength noted to bilateral hands. Pulses: 2+ and symmetric  Skin: Skin color, texture, turgor normal. No rashes or lesions  Neurologic: Alert and oriented X 3, normal strength. Resting tremor to bilateral legs. Ambulation with cane. Psych: appropriate mood, speech, affect    Nursing note and vitals reviewed  Assessment/Plan:       ICD-10-CM ICD-9-CM    1. Essential hypertension I10 401.9    2. Routine physical examination Z00.00 V70.0 CBC WITH AUTOMATED DIFF      LIPID PANEL      METABOLIC PANEL, COMPREHENSIVE      VITAMIN D, 25 HYDROXY   3. Resting tremor R25.9 781.0    4. History of DVT (deep vein thrombosis) Z86.718 V12.51    5. Akathisia G25.71 781.0    6.  Vitamin D deficiency E55.9 268.9 VITAMIN D, 25 HYDROXY     Labs ordered, she will continue to see psych and hand specialty. She no longer sees oncology/hematology or neuro. If pressures continue to spike or drop cardiology assessment is needed for stress testing and echo. Follow-up and Dispositions    · Return for Schedule your physical, get labs done beforehand. Advised her to call back or return to office if symptoms worsen/change/persist.  Discussed expected course/resolution/complications of diagnosis in detail with patient. Medication risks/benefits/costs/interactions/alternatives discussed with patient. She was given an after visit summary which includes diagnoses, current medications, & vitals. She expressed understanding with the diagnosis and plan.

## 2019-08-22 DIAGNOSIS — G25.71 AKATHISIA: ICD-10-CM

## 2019-08-22 RX ORDER — GABAPENTIN 100 MG/1
200 CAPSULE ORAL 3 TIMES DAILY
Qty: 270 CAP | Refills: 5
Start: 2019-08-22 | End: 2019-12-18 | Stop reason: SDUPTHER

## 2019-08-30 LAB
25(OH)D3+25(OH)D2 SERPL-MCNC: 16.7 NG/ML (ref 30–100)
ALBUMIN SERPL-MCNC: 4.3 G/DL (ref 3.6–4.8)
ALBUMIN/GLOB SERPL: 1.6 {RATIO} (ref 1.2–2.2)
ALP SERPL-CCNC: 126 IU/L (ref 39–117)
ALT SERPL-CCNC: 11 IU/L (ref 0–32)
AST SERPL-CCNC: 20 IU/L (ref 0–40)
BASOPHILS # BLD AUTO: 0 X10E3/UL (ref 0–0.2)
BASOPHILS NFR BLD AUTO: 0 %
BILIRUB SERPL-MCNC: 0.8 MG/DL (ref 0–1.2)
BUN SERPL-MCNC: 6 MG/DL (ref 8–27)
BUN/CREAT SERPL: 13 (ref 12–28)
CALCIUM SERPL-MCNC: 9.6 MG/DL (ref 8.7–10.3)
CHLORIDE SERPL-SCNC: 103 MMOL/L (ref 96–106)
CHOLEST SERPL-MCNC: 210 MG/DL (ref 100–199)
CO2 SERPL-SCNC: 23 MMOL/L (ref 20–29)
CREAT SERPL-MCNC: 0.46 MG/DL (ref 0.57–1)
EOSINOPHIL # BLD AUTO: 0 X10E3/UL (ref 0–0.4)
EOSINOPHIL NFR BLD AUTO: 1 %
ERYTHROCYTE [DISTWIDTH] IN BLOOD BY AUTOMATED COUNT: 15 % (ref 12.3–15.4)
GLOBULIN SER CALC-MCNC: 2.7 G/DL (ref 1.5–4.5)
GLUCOSE SERPL-MCNC: 96 MG/DL (ref 65–99)
HCT VFR BLD AUTO: 40.6 % (ref 34–46.6)
HDLC SERPL-MCNC: 72 MG/DL
HGB BLD-MCNC: 13.7 G/DL (ref 11.1–15.9)
IMM GRANULOCYTES # BLD AUTO: 0 X10E3/UL (ref 0–0.1)
IMM GRANULOCYTES NFR BLD AUTO: 0 %
LDLC SERPL CALC-MCNC: 112 MG/DL (ref 0–99)
LYMPHOCYTES # BLD AUTO: 1.1 X10E3/UL (ref 0.7–3.1)
LYMPHOCYTES NFR BLD AUTO: 27 %
MCH RBC QN AUTO: 31.6 PG (ref 26.6–33)
MCHC RBC AUTO-ENTMCNC: 33.7 G/DL (ref 31.5–35.7)
MCV RBC AUTO: 94 FL (ref 79–97)
MONOCYTES # BLD AUTO: 0.4 X10E3/UL (ref 0.1–0.9)
MONOCYTES NFR BLD AUTO: 9 %
NEUTROPHILS # BLD AUTO: 2.5 X10E3/UL (ref 1.4–7)
NEUTROPHILS NFR BLD AUTO: 63 %
PLATELET # BLD AUTO: 189 X10E3/UL (ref 150–450)
POTASSIUM SERPL-SCNC: 4.2 MMOL/L (ref 3.5–5.2)
PROT SERPL-MCNC: 7 G/DL (ref 6–8.5)
RBC # BLD AUTO: 4.34 X10E6/UL (ref 3.77–5.28)
SODIUM SERPL-SCNC: 141 MMOL/L (ref 134–144)
TRIGL SERPL-MCNC: 131 MG/DL (ref 0–149)
VLDLC SERPL CALC-MCNC: 26 MG/DL (ref 5–40)
WBC # BLD AUTO: 4.1 X10E3/UL (ref 3.4–10.8)

## 2019-08-30 NOTE — PROGRESS NOTES
Cholesterol is mildly elevated    Vit D is very low  Please send in Vit D3   50,000 international units   1 tab po q week for 8 weeks    Follow up with Annika Lafleur

## 2019-09-03 DIAGNOSIS — E55.9 VITAMIN D DEFICIENCY: Primary | ICD-10-CM

## 2019-09-03 PROBLEM — E78.5 HYPERLIPIDEMIA: Status: ACTIVE | Noted: 2019-09-03

## 2019-09-03 PROBLEM — R74.8 ELEVATED ALKALINE PHOSPHATASE LEVEL: Status: ACTIVE | Noted: 2019-09-03

## 2019-09-03 RX ORDER — ERGOCALCIFEROL 1.25 MG/1
50000 CAPSULE ORAL
Qty: 4 CAP | Refills: 3 | Status: SHIPPED | OUTPATIENT
Start: 2019-09-03 | End: 2020-10-19

## 2019-09-03 NOTE — PROGRESS NOTES
Vitamin D level is very low so I sent a weekly supplement. Start this and when it is finished, in a couple months, start a daily supplement of vitamin D3 at 2,000 units (this is over the counter). Your cholesterol has gone up some since it was last check. The good news is your HDL (good cholesterol) is excellent and serving as a protective factor. Watch your saturated fats, greasy or fried foods, and whole fat diary. These are good ways to bring the LDL down closer to 100 (which is goal). Your alkaline phosphatase is still elevated but I see that this has been the case for years. It is actually lower than where it was at last check.

## 2019-12-18 ENCOUNTER — OFFICE VISIT (OUTPATIENT)
Dept: INTERNAL MEDICINE CLINIC | Age: 59
End: 2019-12-18

## 2019-12-18 VITALS
DIASTOLIC BLOOD PRESSURE: 80 MMHG | BODY MASS INDEX: 17.19 KG/M2 | RESPIRATION RATE: 16 BRPM | WEIGHT: 107 LBS | OXYGEN SATURATION: 98 % | HEART RATE: 102 BPM | SYSTOLIC BLOOD PRESSURE: 100 MMHG | HEIGHT: 66 IN | TEMPERATURE: 99.1 F

## 2019-12-18 DIAGNOSIS — Z23 ENCOUNTER FOR IMMUNIZATION: ICD-10-CM

## 2019-12-18 DIAGNOSIS — E55.9 VITAMIN D DEFICIENCY: ICD-10-CM

## 2019-12-18 DIAGNOSIS — G25.71 AKATHISIA: ICD-10-CM

## 2019-12-18 DIAGNOSIS — I10 ESSENTIAL HYPERTENSION: Primary | ICD-10-CM

## 2019-12-18 DIAGNOSIS — F10.11 HISTORY OF ALCOHOL ABUSE: ICD-10-CM

## 2019-12-18 RX ORDER — FOLIC ACID 1 MG/1
1 TABLET ORAL DAILY
Qty: 90 TAB | Refills: 3 | Status: SHIPPED | OUTPATIENT
Start: 2019-12-18 | End: 2021-01-27 | Stop reason: SDUPTHER

## 2019-12-18 RX ORDER — LANOLIN ALCOHOL/MO/W.PET/CERES
100 CREAM (GRAM) TOPICAL DAILY
Qty: 90 TAB | Refills: 3 | Status: SHIPPED | OUTPATIENT
Start: 2019-12-18 | End: 2022-06-17 | Stop reason: ALTCHOICE

## 2019-12-18 RX ORDER — GABAPENTIN 100 MG/1
200 CAPSULE ORAL 3 TIMES DAILY
Qty: 270 CAP | Refills: 5 | Status: SHIPPED | OUTPATIENT
Start: 2019-12-18 | End: 2020-07-16 | Stop reason: SDUPTHER

## 2019-12-18 RX ORDER — METOPROLOL TARTRATE 25 MG/1
25 TABLET, FILM COATED ORAL DAILY
Qty: 30 TAB | Refills: 0 | Status: SHIPPED | OUTPATIENT
Start: 2019-12-18 | End: 2020-01-24 | Stop reason: SDUPTHER

## 2019-12-18 RX ORDER — ACETAMINOPHEN 500 MG
2000 TABLET ORAL DAILY
Qty: 30 CAP | Refills: 0 | Status: SHIPPED | OUTPATIENT
Start: 2019-12-18 | End: 2020-02-18 | Stop reason: SDUPTHER

## 2019-12-18 NOTE — PROGRESS NOTES
HISTORY OF PRESENT ILLNESS  Trenton Wiggins is a 60 y.o. female. Patient is accompanied by . Patient presents for follow up low vitamin D and htn. Patient sees psych at  [employer] authority every 2 months. No concerns today. Visit Vitals  /80 (BP 1 Location: Left arm, BP Patient Position: Sitting)   Pulse (!) 102   Temp 99.1 °F (37.3 °C)   Resp 16   Ht 5' 6\" (1.676 m)   Wt 107 lb (48.5 kg)   SpO2 98%   BMI 17.27 kg/m²       HPI    Review of Systems   Respiratory: Negative for shortness of breath. Cardiovascular: Negative for chest pain. Neurological: Negative for dizziness and headaches. Physical Exam  Neck:      Musculoskeletal: Normal range of motion. Cardiovascular:      Rate and Rhythm: Normal rate and regular rhythm. Pulmonary:      Effort: Pulmonary effort is normal.      Breath sounds: Normal breath sounds. Lymphadenopathy:      Cervical: No cervical adenopathy. Skin:     General: Skin is warm and dry. Neurological:      Mental Status: She is alert. Mental status is at baseline. Psychiatric:         Mood and Affect: Mood normal.         Behavior: Behavior is cooperative. ASSESSMENT and PLAN    ICD-10-CM ICD-9-CM    1. Essential hypertension I10 401.9 CBC WITH AUTOMATED DIFF      METABOLIC PANEL, COMPREHENSIVE   2. Akathisia G25.71 781.0 gabapentin (NEURONTIN) 100 mg capsule   3. History of alcohol abuse L64.86 888.74 folic acid (FOLVITE) 1 mg tablet      thiamine HCL (VITAMIN B-1) 100 mg tablet   4. Vitamin D deficiency E55.9 268.9 VITAMIN D, 25 HYDROXY   5.  Encounter for immunization Z23 V03.89 INFLUENZA VIRUS VAC QUAD,SPLIT,PRESV FREE SYRINGE IM      [place] IMMUNIZ ADMIN,1 SINGLE/COMB VAC/TOXOID     Orders Placed This Encounter    Influenza virus vaccine (QUADRIVALENT PRES FREE SYRINGE) IM (12625)    VITAMIN D, 25 HYDROXY    CBC WITH AUTOMATED DIFF    METABOLIC PANEL, COMPREHENSIVE    metoprolol tartrate (LOPRESSOR) 25 mg tablet    gabapentin (NEURONTIN) 100 mg capsule    folic acid (FOLVITE) 1 mg tablet    thiamine HCL (VITAMIN B-1) 100 mg tablet    cholecalciferol (VITAMIN D3) (2,000 UNITS /50 MCG) cap capsule   labs ordered  Continue current medications

## 2019-12-18 NOTE — PROGRESS NOTES
Minoo Montoya is a 60 y.o. female who presents for routine immunizations. Verbal order per Joshua Mullen NP for influenza vaccine. She denies any symptoms , reactions or allergies that would exclude them from being immunized today. Risks and adverse reactions were discussed and the VIS was given to them. All questions were addressed. She was observed for 10 min post injection. There were no reactions observed.     Jaylan Esparza LPN

## 2019-12-18 NOTE — PATIENT INSTRUCTIONS
Vaccine Information Statement Influenza (Flu) Vaccine (Inactivated or Recombinant): What You Need to Know Many Vaccine Information Statements are available in Azeri and other languages. See www.immunize.org/vis Hojas de información sobre vacunas están disponibles en español y en muchos otros idiomas. Visite www.immunize.org/vis 1. Why get vaccinated? Influenza vaccine can prevent influenza (flu). Flu is a contagious disease that spreads around the [facility] every year, usually between October and May. Anyone can get the flu, but it is more dangerous for some people. Infants and young children, people 72years of age and older, pregnant women, and people with certain health conditions or a weakened immune system are at greatest risk of flu complications. Pneumonia, bronchitis, sinus infections and ear infections are examples of flu-related complications. If you have a medical condition, such as heart disease, cancer or diabetes, flu can make it worse. Flu can cause fever and chills, sore throat, muscle aches, fatigue, cough, headache, and runny or stuffy nose. Some people may have vomiting and diarrhea, though this is more common in children than adults. Each year thousands of people in the [facility] die from flu, and many more are hospitalized. Flu vaccine prevents millions of illnesses and flu-related visits to the doctor each year. 2. Influenza vaccines CDC recommends everyone 10months of age and older get vaccinated every flu season. Children 6 months through 6years of age may need 2 doses during a single flu season. Everyone else needs only 1 dose each flu season. It takes about 2 weeks for protection to develop after vaccination. There are many flu viruses, and they are always changing. Each year a new flu vaccine is made to protect against three or four viruses that are likely to cause disease in the upcoming flu season.  Even when the vaccine doesnt exactly match these viruses, it may still provide some protection. Influenza vaccine does not cause flu. Influenza vaccine may be given at the same time as other vaccines. 3. Talk with your health care provider Tell your vaccine provider if the person getting the vaccine: 
 Has had an allergic reaction after a previous dose of influenza vaccine, or has any severe, life-threatening allergies.  Has ever had Guillain-Barré Syndrome (also called GBS). In some cases, your health care provider may decide to postpone influenza vaccination to a future visit. People with minor illnesses, such as a cold, may be vaccinated. People who are moderately or severely ill should usually wait until they recover before getting influenza vaccine. Your health care provider can give you more information. 4. Risks of a reaction  Soreness, redness, and swelling where shot is given, fever, muscle aches, and headache can happen after influenza vaccine.  There may be a very small increased risk of Guillain-Barré Syndrome (GBS) after inactivated influenza vaccine (the flu shot). Viv Agarwal children who get the flu shot along with pneumococcal vaccine (PCV13), and/or DTaP vaccine at the same time might be slightly more likely to have a seizure caused by fever. Tell your health care provider if a child who is getting flu vaccine has ever had a seizure. People sometimes faint after medical procedures, including vaccination. Tell your provider if you feel dizzy or have vision changes or ringing in the ears. As with any medicine, there is a very remote chance of a vaccine causing a severe allergic reaction, other serious injury, or death. 5. What if there is a serious problem? An allergic reaction could occur after the vaccinated person leaves the clinic.  If you see signs of a severe allergic reaction (hives, swelling of the face and throat, difficulty breathing, a fast heartbeat, dizziness, or weakness), call 9-1-1 and get the person to the nearest hospital. 
 
For other signs that concern you, call your health care provider. Adverse reactions should be reported to the Vaccine Adverse Event Reporting System (VAERS). Your health care provider will usually file this report, or you can do it yourself. Visit the VAERS website at www.vaers. hhs.gov or call 0-280.785.2519. VAERS is only for reporting reactions, and VAERS staff do not give medical advice. 6. The National Vaccine Injury Compensation Program 
 
The [facility] Vaccine Injury Compensation Program (VICP) is a federal program that was created to compensate people who may have been injured by certain vaccines. Visit the VICP website at www.[facility].gov/vaccinecompensation or call 2-924.917.5769 to learn about the program and about filing a claim. There is a time limit to file a claim for compensation. 7. How can I learn more?  Ask your health care provider.  Call your local or state health department.  Contact the Centers for Disease Control and Prevention (CDC): 
- Call 9-833.571.6938 (1-800-CDC-INFO) or 
- Visit CDCs influenza website at www.cdc.gov/flu Vaccine Information Statement (Interim) Inactivated Influenza Vaccine 8/15/2019 
42 [employer] Law 611HO-23 Department of Health and [employer] Centers for Disease Control and Prevention Office Use Only

## 2019-12-18 NOTE — PROGRESS NOTES
Chief Complaint   Patient presents with    Medication Refill     Reviewed record in preparation for visit and have obtained necessary documentation. Identified pt with two pt identifiers(name and ). Health Maintenance Due   Topic    Hepatitis C Screening     Pneumococcal 0-64 years (1 of 1 - PPSV23)    DTaP/Tdap/Td series (1 - Tdap)    PAP AKA CERVICAL CYTOLOGY     Shingrix Vaccine Age 50> (1 of 2)    BREAST CANCER SCRN MAMMOGRAM     FOBT Q 1 YEAR AGE 54-65     Influenza Age 5 to Adult          Chief Complaint   Patient presents with    Medication Refill        Wt Readings from Last 3 Encounters:   19 107 lb (48.5 kg)   08/15/19 106 lb (48.1 kg)   19 101 lb (45.8 kg)     Temp Readings from Last 3 Encounters:   19 99.1 °F (37.3 °C)   08/15/19 98 °F (36.7 °C) (Oral)   19 98.6 °F (37 °C) (Oral)     BP Readings from Last 3 Encounters:   19 100/80   08/15/19 125/85   19 (!) 149/93     Pulse Readings from Last 3 Encounters:   19 (!) 102   08/15/19 69   19 (!) 109           Learning Assessment:  :     No flowsheet data found. Depression Screening:  :     3 most recent PHQ Screens 2019   Little interest or pleasure in doing things Several days   Feeling down, depressed, irritable, or hopeless Several days   Total Score PHQ 2 2       Fall Risk Assessment:  :     No flowsheet data found. Abuse Screening:  :     No flowsheet data found. Coordination of Care Questionnaire:  :     1) Have you been to an emergency room, urgent care clinic since your last visit? no   Hospitalized since your last visit? no             2) Have you seen or consulted any other health care providers outside of [address] [place] [address] since your last visit? yes  (Include any pap smears or colon screenings in this section.)    3) Do you have an Advance Directive on file? no    4) Are you interested in receiving information on Advance Directives?  NO      Patient is accompanied by adult counselor I have received verbal consent from Andrade Mcleod to discuss any/all medical information while they are present in the room. Reviewed record  In preparation for visit and have obtained necessary documentation.

## 2019-12-19 LAB
25(OH)D3+25(OH)D2 SERPL-MCNC: 39.8 NG/ML (ref 30–100)
ALBUMIN SERPL-MCNC: 5 G/DL (ref 3.6–4.8)
ALBUMIN/GLOB SERPL: 1.7 {RATIO} (ref 1.2–2.2)
ALP SERPL-CCNC: 136 IU/L (ref 39–117)
ALT SERPL-CCNC: 15 IU/L (ref 0–32)
AST SERPL-CCNC: 18 IU/L (ref 0–40)
BASOPHILS # BLD AUTO: 0 X10E3/UL (ref 0–0.2)
BASOPHILS NFR BLD AUTO: 1 %
BILIRUB SERPL-MCNC: 0.3 MG/DL (ref 0–1.2)
BUN SERPL-MCNC: 9 MG/DL (ref 8–27)
BUN/CREAT SERPL: 12 (ref 12–28)
CALCIUM SERPL-MCNC: 10 MG/DL (ref 8.7–10.3)
CHLORIDE SERPL-SCNC: 100 MMOL/L (ref 96–106)
CO2 SERPL-SCNC: 23 MMOL/L (ref 20–29)
CREAT SERPL-MCNC: 0.75 MG/DL (ref 0.57–1)
EOSINOPHIL # BLD AUTO: 0.1 X10E3/UL (ref 0–0.4)
EOSINOPHIL NFR BLD AUTO: 1 %
ERYTHROCYTE [DISTWIDTH] IN BLOOD BY AUTOMATED COUNT: 12.7 % (ref 12.3–15.4)
GLOBULIN SER CALC-MCNC: 3 G/DL (ref 1.5–4.5)
GLUCOSE SERPL-MCNC: 91 MG/DL (ref 65–99)
HCT VFR BLD AUTO: 46.2 % (ref 34–46.6)
HGB BLD-MCNC: 15.4 G/DL (ref 11.1–15.9)
IMM GRANULOCYTES # BLD AUTO: 0 X10E3/UL (ref 0–0.1)
IMM GRANULOCYTES NFR BLD AUTO: 0 %
LYMPHOCYTES # BLD AUTO: 2.4 X10E3/UL (ref 0.7–3.1)
LYMPHOCYTES NFR BLD AUTO: 45 %
MCH RBC QN AUTO: 32.8 PG (ref 26.6–33)
MCHC RBC AUTO-ENTMCNC: 33.3 G/DL (ref 31.5–35.7)
MCV RBC AUTO: 98 FL (ref 79–97)
MONOCYTES # BLD AUTO: 0.4 X10E3/UL (ref 0.1–0.9)
MONOCYTES NFR BLD AUTO: 8 %
NEUTROPHILS # BLD AUTO: 2.5 X10E3/UL (ref 1.4–7)
NEUTROPHILS NFR BLD AUTO: 45 %
PLATELET # BLD AUTO: 326 X10E3/UL (ref 150–450)
POTASSIUM SERPL-SCNC: 4.3 MMOL/L (ref 3.5–5.2)
PROT SERPL-MCNC: 8 G/DL (ref 6–8.5)
RBC # BLD AUTO: 4.7 X10E6/UL (ref 3.77–5.28)
SODIUM SERPL-SCNC: 143 MMOL/L (ref 134–144)
WBC # BLD AUTO: 5.3 X10E3/UL (ref 3.4–10.8)

## 2020-01-24 RX ORDER — METOPROLOL TARTRATE 25 MG/1
25 TABLET, FILM COATED ORAL DAILY
Qty: 30 TAB | Refills: 0 | Status: SHIPPED | OUTPATIENT
Start: 2020-01-24 | End: 2020-03-25 | Stop reason: SDUPTHER

## 2020-02-19 RX ORDER — ACETAMINOPHEN 500 MG
2000 TABLET ORAL DAILY
Qty: 90 CAP | Refills: 1 | Status: SHIPPED | OUTPATIENT
Start: 2020-02-19 | End: 2020-12-31 | Stop reason: SDUPTHER

## 2020-02-19 NOTE — TELEPHONE ENCOUNTER
Orders Placed This Encounter    cholecalciferol (VITAMIN D3) (2,000 UNITS /50 MCG) cap capsule     Sig: Take 2,000 Units by mouth daily.      Dispense:  90 Cap     Refill:  1     The above orders were approved via [facility] per [facility]

## 2020-03-25 NOTE — TELEPHONE ENCOUNTER
Requested Prescriptions     Pending Prescriptions Disp Refills    metoprolol tartrate (LOPRESSOR) 25 mg tablet 30 Tab 0     Sig: Take 1 Tab by mouth daily.      [address] [facility]  [phone]

## 2020-03-26 RX ORDER — METOPROLOL TARTRATE 25 MG/1
25 TABLET, FILM COATED ORAL DAILY
Qty: 90 TAB | Refills: 1 | Status: SHIPPED | OUTPATIENT
Start: 2020-03-26 | End: 2020-09-30 | Stop reason: SINTOL

## 2020-07-13 ENCOUNTER — TELEPHONE (OUTPATIENT)
Dept: INTERNAL MEDICINE CLINIC | Age: 60
End: 2020-07-13

## 2020-07-16 DIAGNOSIS — G25.71 AKATHISIA: ICD-10-CM

## 2020-07-16 NOTE — TELEPHONE ENCOUNTER
Requested Prescriptions     Pending Prescriptions Disp Refills    gabapentin (Neurontin) 100 mg capsule 270 Cap 5     Sig: Take 2 Caps by mouth three (3) times daily.

## 2020-07-21 RX ORDER — GABAPENTIN 100 MG/1
200 CAPSULE ORAL 3 TIMES DAILY
Qty: 270 CAP | Refills: 1 | Status: SHIPPED | OUTPATIENT
Start: 2020-07-21 | End: 2020-11-19 | Stop reason: SDUPTHER

## 2020-07-22 ENCOUNTER — TELEPHONE (OUTPATIENT)
Dept: INTERNAL MEDICINE CLINIC | Age: 60
End: 2020-07-22

## 2020-07-22 NOTE — TELEPHONE ENCOUNTER
Martha Whipple ([facility]) [phone]     Pt's  called to schedule appt for an in office with you. There are no appts available with you until 8/19 -- is it ok to wait until then or do you want her to see another provider?

## 2020-09-30 ENCOUNTER — OFFICE VISIT (OUTPATIENT)
Dept: INTERNAL MEDICINE CLINIC | Age: 60
End: 2020-09-30
Payer: MEDICAID

## 2020-09-30 ENCOUNTER — TELEPHONE (OUTPATIENT)
Dept: CARDIOLOGY CLINIC | Age: 60
End: 2020-09-30

## 2020-09-30 VITALS
DIASTOLIC BLOOD PRESSURE: 88 MMHG | RESPIRATION RATE: 16 BRPM | WEIGHT: 108.2 LBS | SYSTOLIC BLOOD PRESSURE: 110 MMHG | TEMPERATURE: 97.5 F | HEIGHT: 66 IN | HEART RATE: 88 BPM | OXYGEN SATURATION: 98 % | BODY MASS INDEX: 17.39 KG/M2

## 2020-09-30 DIAGNOSIS — I10 ESSENTIAL HYPERTENSION: ICD-10-CM

## 2020-09-30 DIAGNOSIS — G25.71 AKATHISIA: ICD-10-CM

## 2020-09-30 DIAGNOSIS — M79.604 PAIN OF RIGHT LOWER EXTREMITY: ICD-10-CM

## 2020-09-30 DIAGNOSIS — R42 DIZZINESS: ICD-10-CM

## 2020-09-30 DIAGNOSIS — Z91.81 AT HIGH RISK FOR INJURY RELATED TO FALL: ICD-10-CM

## 2020-09-30 DIAGNOSIS — I95.1 POSTURAL HYPOTENSION: Primary | ICD-10-CM

## 2020-09-30 DIAGNOSIS — Z23 ENCOUNTER FOR IMMUNIZATION: ICD-10-CM

## 2020-09-30 PROCEDURE — 90686 IIV4 VACC NO PRSV 0.5 ML IM: CPT | Performed by: NURSE PRACTITIONER

## 2020-09-30 PROCEDURE — 90471 IMMUNIZATION ADMIN: CPT | Performed by: NURSE PRACTITIONER

## 2020-09-30 PROCEDURE — 99214 OFFICE O/P EST MOD 30 MIN: CPT | Performed by: NURSE PRACTITIONER

## 2020-09-30 NOTE — TELEPHONE ENCOUNTER
9/30/2020 at 5:06 left message call to schedule new patient appointment with Renard Funk MD per Bertha Davis NP for postural hypotension, essential hypertension - ref is in cc

## 2020-09-30 NOTE — PROGRESS NOTES
Pt. Is here for dizziness and frequent falls. Skyla Sarkar  is a 63 y.o. @  who present for routine immunizations. Prior to vaccine administration: Consent was obtained. Risks and adverse reactions were discussed. The patient was provided the VIS and they were given an opportunity to ask questions; all questions were addressed. She  denies any symptoms, reactions or allergies that would exclude them from being immunized today. There were no adverse reactions observed post vaccination. Patient was advised to seek medical or call the office with any questions or concerns post vaccination. Patient verbalized understanding.    Sheri Akins LPN

## 2020-09-30 NOTE — PROGRESS NOTES
Subjective:      Yakov Lozada is a 63 y.o. female who presents today for follow up. Dizziness: she has persistent dizziness where she feels like she will faint. This has been progressively getting worse over the past few months. She was so dizzy at one point that she fell and hit her head. She states she does not drink a lot of fluids, she only drinks flavored water. She is taking the metoprolol 25mg daily. She has a history of alcoholism but notes she has not been drinking recently and only has a drink on occasion.  is present and reports that pt previously was having issues with mediation compliance, that has been resolved over the past months and she is not taking the metoprolol regularly. Orthostatics performed and show orthostatic hypotension    BP Readings from Last 3 Encounters:   09/30/20 110/88   12/18/19 100/80   08/15/19 125/85     Tremor: she has essential leg tremor and is followed by [facility]. She is high risk for falls    Patient Active Problem List    Diagnosis Date Noted    Hyperlipidemia 09/03/2019    Vitamin D deficiency 09/03/2019    Elevated alkaline phosphatase level 09/03/2019    Bipolar 1 disorder ([facility] 04/23/2019    History of fracture of right hip 04/23/2019    History of alcohol abuse 04/23/2019    Akathisia 04/23/2019    Resting tremor 04/23/2019    History of DVT (deep vein thrombosis) 04/23/2019    Drug-induced mood disorder(292.84) 06/14/2015    Shingles 06/14/2015    Hypertension 06/14/2015    Rosacea 06/24/2014    Alcohol dependence ([facility] 06/23/2014    Personality disorder ([facility] 06/23/2014    Non-compliance with treatment 06/23/2014     Current Outpatient Medications   Medication Sig Dispense Refill    gabapentin (Neurontin) 100 mg capsule Take 2 Caps by mouth three (3) times daily. 270 Cap 1    metoprolol tartrate (LOPRESSOR) 25 mg tablet Take 1 Tab by mouth daily.  90 Tab 1    cholecalciferol (VITAMIN D3) (2,000 UNITS /50 MCG) cap capsule Take 2,000 Units by mouth daily. 90 Cap 1    folic acid (FOLVITE) 1 mg tablet Take 1 Tab by mouth daily. 90 Tab 3    thiamine HCL (VITAMIN B-1) 100 mg tablet Take 1 Tab by mouth daily. 90 Tab 3    ergocalciferol (ERGOCALCIFEROL) 50,000 unit capsule Take 1 Cap by mouth every seven (7) days. 4 Cap 3    citalopram (CELEXA) 10 mg tablet Take  by mouth daily.  benztropine (COGENTIN) 0.5 mg tablet Take 0.5 mg by mouth nightly.  paliperidone (INVEGA) 1.5 mg ER tablet Take 1.5 mg by mouth nightly.  lamoTRIgine (LAMICTAL) 25 mg tablet Take 75 mg by mouth daily. Allergies   Allergen Reactions    Chocolate [Cocoa] Hives    Peanut Other (comments)     Past Medical History:   Diagnosis Date    Akathisia     Bipolar 1 disorder ([address]     DVT (deep venous thrombosis) (HCC)     ETOH abuse     Hypertension     Shingles      Past Surgical History:   Procedure Laterality Date    HX ORTHOPAEDIC      Broken hip R/Neck surgery. Review of Systems    A comprehensive review of systems was negative except for that written in the HPI. Objective:     Visit Vitals  /88 (BP 1 Location: Right arm, BP Patient Position: Sitting)   Pulse 88   Temp 97.5 °F (36.4 °C) (Temporal)   Resp 16   Ht 5' 6\" (1.676 m)   Wt 108 lb 3.2 oz (49.1 kg)   SpO2 98%   BMI 17.46 kg/m²     General appearance: alert, cooperative, no distress, appears stated age  Head: Normocephalic, without obvious abnormality, atraumatic  Eyes: conjunctivae/corneas clear. PERRL, EOM's intact. Fundi benign  Ears: normal TM's and external ear canals AU  Lungs: clear to auscultation bilaterally  Heart: regular rate and rhythm, S1, S2 normal, no murmur, click, rub or gallop  Extremities: extremities normal, atraumatic. Bilateral lower legs with purple color. Right foot with slight edema, pain when palpation.  Pedal pulses weak  Pulses: 2+ and symmetric  Skin: Skin color, texture, turgor normal. No rashes or lesions  Neurologic: Grossly normal  Cranial nerves:   I: smell Not tested   II: visual acuity  Not tested   II: visual fields Full to confrontation   II: pupils Equal, round, reactive to light   III,VII: ptosis none   III,IV,VI: extraocular muscles  Full ROM   V: mastication normal   V: facial light touch sensation  normal   V,VII: corneal reflex  present   VII: facial muscle function - upper  normal   VII: facial muscle function - lower normal   VIII: hearing Not tested   IX: soft palate elevation  normal   IX,X: gag reflex Not tested   XI: trapezius strength  5/5   XI: sternocleidomastoid strength 5/5   XI: neck flexion strength  5/5   XII: tongue strength  normal     Psych: appropriate mood, speech, affect    Nursing note and vitals reviewed  Assessment/Plan:     1. Postural hypotension  - will stop the metoprolol and encouraged her to push fluid. She will obtain BP monitor and message with readings tomorrow. If BP is elevated will start new HTN medication at low dose.  - REFERRAL TO CARDIOLOGY    2. Dizziness  - CBC WITH AUTOMATED DIFF  - METABOLIC PANEL, COMPREHENSIVE    3. Essential hypertension  - miscellaneous medical supply (Blood Pressure Cuff) misc; Use daily to check BP  Dispense: 1 Each; Refill: 0  - REFERRAL TO CARDIOLOGY    4. Akathisia    5. Pain of right lower extremity  - DUPLEX LOWER EXT ARTERY BILAT; Future    6. Encounter for immunization  - INFLUENZA VIRUS VAC QUAD,SPLIT,PRESV FREE SYRINGE IM    7. At high risk for injury related to fall              Advised her to call back or return to office if symptoms worsen/change/persist.  Discussed expected course/resolution/complications of diagnosis in detail with patient. Medication risks/benefits/costs/interactions/alternatives discussed with patient. She was given an after visit summary which includes diagnoses, current medications, & vitals. She expressed understanding with the diagnosis and plan.

## 2020-10-01 ENCOUNTER — HOSPITAL ENCOUNTER (OUTPATIENT)
Dept: VASCULAR SURGERY | Age: 60
Discharge: HOME | End: 2020-10-01
Attending: NURSE PRACTITIONER
Payer: MEDICAID

## 2020-10-01 DIAGNOSIS — M79.604 PAIN OF RIGHT LOWER EXTREMITY: ICD-10-CM

## 2020-10-01 PROBLEM — Z91.81 AT HIGH RISK FOR INJURY RELATED TO FALL: Status: ACTIVE | Noted: 2020-10-01

## 2020-10-01 LAB
ALBUMIN SERPL-MCNC: 5 G/DL (ref 3.8–4.8)
ALBUMIN/GLOB SERPL: 1.9 {RATIO} (ref 1.2–2.2)
ALP SERPL-CCNC: 104 IU/L (ref 39–117)
ALT SERPL-CCNC: 18 IU/L (ref 0–32)
AST SERPL-CCNC: 23 IU/L (ref 0–40)
BASOPHILS # BLD AUTO: 0 X10E3/UL (ref 0–0.2)
BASOPHILS NFR BLD AUTO: 1 %
BILIRUB SERPL-MCNC: 0.4 MG/DL (ref 0–1.2)
BUN SERPL-MCNC: 14 MG/DL (ref 8–27)
BUN/CREAT SERPL: 23 (ref 12–28)
CALCIUM SERPL-MCNC: 10.3 MG/DL (ref 8.7–10.3)
CHLORIDE SERPL-SCNC: 105 MMOL/L (ref 96–106)
CO2 SERPL-SCNC: 25 MMOL/L (ref 20–29)
CREAT SERPL-MCNC: 0.61 MG/DL (ref 0.57–1)
EOSINOPHIL # BLD AUTO: 0 X10E3/UL (ref 0–0.4)
EOSINOPHIL NFR BLD AUTO: 1 %
ERYTHROCYTE [DISTWIDTH] IN BLOOD BY AUTOMATED COUNT: 13.8 % (ref 11.7–15.4)
GLOBULIN SER CALC-MCNC: 2.6 G/DL (ref 1.5–4.5)
GLUCOSE SERPL-MCNC: 97 MG/DL (ref 65–99)
HCT VFR BLD AUTO: 40.9 % (ref 34–46.6)
HGB BLD-MCNC: 14.4 G/DL (ref 11.1–15.9)
IMM GRANULOCYTES # BLD AUTO: 0 X10E3/UL (ref 0–0.1)
IMM GRANULOCYTES NFR BLD AUTO: 0 %
LYMPHOCYTES # BLD AUTO: 1.5 X10E3/UL (ref 0.7–3.1)
LYMPHOCYTES NFR BLD AUTO: 33 %
MCH RBC QN AUTO: 32.1 PG (ref 26.6–33)
MCHC RBC AUTO-ENTMCNC: 35.2 G/DL (ref 31.5–35.7)
MCV RBC AUTO: 91 FL (ref 79–97)
MONOCYTES # BLD AUTO: 0.5 X10E3/UL (ref 0.1–0.9)
MONOCYTES NFR BLD AUTO: 12 %
NEUTROPHILS # BLD AUTO: 2.5 X10E3/UL (ref 1.4–7)
NEUTROPHILS NFR BLD AUTO: 53 %
PLATELET # BLD AUTO: 229 X10E3/UL (ref 150–450)
POTASSIUM SERPL-SCNC: 4.5 MMOL/L (ref 3.5–5.2)
PROT SERPL-MCNC: 7.6 G/DL (ref 6–8.5)
RBC # BLD AUTO: 4.49 X10E6/UL (ref 3.77–5.28)
SODIUM SERPL-SCNC: 143 MMOL/L (ref 134–144)
WBC # BLD AUTO: 4.6 X10E3/UL (ref 3.4–10.8)

## 2020-10-01 PROCEDURE — 93925 LOWER EXTREMITY STUDY: CPT

## 2020-10-01 NOTE — PROGRESS NOTES
Albumin elevation is stable. All other labs normal! Please message me with BP readings with her OFF the metoprolol.

## 2020-10-02 NOTE — TELEPHONE ENCOUNTER
Patient scheduled for in office new patient appointment with Dr. Lu Wang   Date Time Provider Mlaik Grant   10/19/2020  3:20 PM MD ALENA Michael AMB

## 2020-10-06 LAB
LEFT ABI: 1.04
LEFT ANTERIOR TIBIAL: 130 MMHG
LEFT CFA DIST SYS PSV: 63.4 CM/S
LEFT CFA PROX SYS PSV: 94.1 CM/S
LEFT DIST PTA PSV: 94.1 CM/S
LEFT POP A PROX VEL RATIO: 1.19
LEFT POPLITEAL DIST SYS PSV: 90.8 CM/S
LEFT POPLITEAL PROX SYS PSV: 82.8 CM/S
LEFT POSTERIOR TIBIAL: 125 MMHG
LEFT PROX ATA VELOCITY: 53.7 CM/S
LEFT PROX PFA A PSV: 48.9 CM/S
LEFT PROX PTA PSV: 68.2 CM/S
LEFT SFA DIST VEL RATIO: 1.05
LEFT SFA MID VEL RATIO: 0.85
LEFT SFA PROX VEL RATIO: 0.83
LEFT SUPER FEMORAL DIST SYS PSV: 69.8 CM/S
LEFT SUPER FEMORAL MID SYS PSV: 66.6 CM/S
LEFT SUPER FEMORAL PROX SYS PSV: 77.9 CM/S
RIGHT ABI: 1.04
RIGHT ANTERIOR TIBIAL: 120 MMHG
RIGHT ARM BP: 125 MMHG
RIGHT CFA DIST SYS PSV: 68.4 CM/S
RIGHT CFA PROX SYS PSV: 76.1 CM/S
RIGHT DIST PTA PSV: 81.2 CM/S
RIGHT POP A PROX VEL RATIO: 0.76
RIGHT POPLITEAL DIST SYS PSV: 105.1 CM/S
RIGHT POPLITEAL PROX SYS PSV: 61.1 CM/S
RIGHT POSTERIOR TIBIAL: 130 MMHG
RIGHT PROX ATA VELOCITY: 42.4 CM/S
RIGHT PROX PFA A PSV: 55.2 CM/S
RIGHT PROX PTA PSV: 115.1 CM/S
RIGHT SFA DIST VEL RATIO: 1.03
RIGHT SFA MID VEL RATIO: 0.9
RIGHT SFA PROX VEL RATIO: 1.1
RIGHT SUPER FEMORAL DIST SYS PSV: 80.5 CM/S
RIGHT SUPER FEMORAL MID SYS PSV: 78 CM/S
RIGHT SUPER FEMORAL PROX SYS PSV: 87 CM/S

## 2020-10-09 ENCOUNTER — TELEPHONE (OUTPATIENT)
Dept: INTERNAL MEDICINE CLINIC | Age: 60
End: 2020-10-09

## 2020-10-09 RX ORDER — METOPROLOL TARTRATE 25 MG/1
25 TABLET, FILM COATED ORAL DAILY
Qty: 30 TAB | Refills: 0 | Status: SHIPPED | OUTPATIENT
Start: 2020-10-09 | End: 2020-10-19

## 2020-10-09 NOTE — TELEPHONE ENCOUNTER
----- Message from Samy Mccrary sent at 10/8/2020  8:58 AM EDT -----  Regarding: RE: Visit Follow-Up Question  Contact: [phone]  Can you call in a refill to [address] [place] [address] so we can get that added to her medpac? I have 3 days worth right now and we'll ask them to drop off more until they can get her weekly card filled.  Thank you

## 2020-10-19 ENCOUNTER — OFFICE VISIT (OUTPATIENT)
Dept: CARDIOLOGY CLINIC | Age: 60
End: 2020-10-19
Payer: MEDICAID

## 2020-10-19 VITALS
WEIGHT: 102 LBS | BODY MASS INDEX: 16.39 KG/M2 | HEART RATE: 110 BPM | SYSTOLIC BLOOD PRESSURE: 120 MMHG | RESPIRATION RATE: 22 BRPM | HEIGHT: 66 IN | DIASTOLIC BLOOD PRESSURE: 80 MMHG | OXYGEN SATURATION: 97 %

## 2020-10-19 DIAGNOSIS — I10 ESSENTIAL HYPERTENSION: ICD-10-CM

## 2020-10-19 DIAGNOSIS — E78.2 MIXED HYPERLIPIDEMIA: ICD-10-CM

## 2020-10-19 DIAGNOSIS — R06.09 DYSPNEA ON EXERTION: ICD-10-CM

## 2020-10-19 DIAGNOSIS — I95.1 ORTHOSTATIC HYPOTENSION: Primary | ICD-10-CM

## 2020-10-19 DIAGNOSIS — R42 DIZZINESS: ICD-10-CM

## 2020-10-19 DIAGNOSIS — R55 NEAR SYNCOPE: ICD-10-CM

## 2020-10-19 DIAGNOSIS — F31.9 BIPOLAR 1 DISORDER (HCC): ICD-10-CM

## 2020-10-19 PROCEDURE — 99204 OFFICE O/P NEW MOD 45 MIN: CPT | Performed by: INTERNAL MEDICINE

## 2020-10-19 PROCEDURE — 93000 ELECTROCARDIOGRAM COMPLETE: CPT | Performed by: INTERNAL MEDICINE

## 2020-10-19 RX ORDER — METOPROLOL SUCCINATE 25 MG/1
25 TABLET, EXTENDED RELEASE ORAL EVERY EVENING
Qty: 90 TAB | Refills: 1 | Status: SHIPPED | OUTPATIENT
Start: 2020-10-19 | End: 2020-11-04 | Stop reason: SDUPTHER

## 2020-10-19 NOTE — PROGRESS NOTES
Reason for consult: orthostatic hypotension  Requesting physician: Tiny Randle NP    Pronounced \"Luis Manuel-di\"    HPI: Ezekiel Shelton, a 63y.o. year-old who presents for evaluation of orthostatic hypotension. Her  Beba Gomez is here with her today  She has fallen several times   Two years ago she fell and broke her hip and was found down 4 days later, could not describe what happened   Her balance and coordination have been off   Has tardive dyskinesia from [place], trying to get on Ingressa now to treat TD but needs cardiac clearance for this since Ingressa can cause bradycardia  She was orthostatic at her visit with PCP on 9/30/20  Today /82 lying, 110/80 standing  Now has BP monitor, thinks it is giving high readings and accuracy needs to be checked  Originally her metoprolol was held for orthostatic hypotension but then they called PCP and reported elevated readings so she started taking metoprolol tartrate 25mg daily  Has dyspnea with exertion, worse with wearing mask, has been worse the last several months  No PND, naps some during the day   No chest pain or palpitations  No LE edema   She is trying to drink more water now - drinks 2-3 bottles of water daily   Does not add salt to her food but she eats a lot of frozen meals, doesn't eat any salty snacks, likes soup    Unclear situation, hard to say how much of her fall risk is TDK vs some other(cardaic) cause, will need assessment of her rhythm and EF with echo and loop then follow-up evaluation. May have to consider ILR if sx persist.       Assessment/Plan:  1. Dizziness/lightheadedness/falls - advised   -will order 2 week loop monitor to assess for bradycardia and arrhythmias, will order TTE to assess cardiac structure and function  -she will follow up with Sun Cancino NP in 1 month  2.   Orthostatic hypotension - encouraged her to stay hydrated and increase sodium intake (see patient instructions for details)  -will stop metoprolol tartrate and begin Toprol XL 25mg in the evening  3. Dyspnea with exertion - will defer stress testing for now due to concerns over suboptimal imaging with her tardive dyskinesia, advised her to have coronary calcium scan to look for evidence of early plaque in the arteries of her heart  -if stress testing was needed we would probably have to do DSE but at this time I have a pretty low suspicion for CAD as the cause of her syncope etc.   4.  Tardive dyskinesia - will need cardiac clearance to start Ingressa after cardiac workup is complete     Venous duplex 10/20 - no DVT    Fam Hx: father passed at age 68 from pneumonia, had multiple strokes and one heart attack prior to that  Soc Hx: no tobacco use, heavy etoh until a few years ago, now only has 1-2 beers/weekend    She  has a past medical history of Akathisia, Bipolar 1 disorder ([facility] DVT (deep venous thrombosis) ([facility] ETOH abuse, Hypertension, and Shingles. She also has no past medical history of Second hand smoke exposure. Cardiovascular ROS: no chest pain or palpitations   Respiratory ROS: no cough or wheezing  Neurological ROS: no TIA or stroke symptoms  All other systems negative except as above. PE  Vitals:    10/19/20 1505   BP: 120/80   Pulse: (!) 110   Resp: 22   SpO2: 97%   Weight: 102 lb (46.3 kg)   Height: 5' 6\" (1.676 m)    Body mass index is 16.46 kg/m².    General appearance - alert, well appearing, and in no distress, + dyskinesia  Mental status - affect appropriate to mood  Eyes - sclera anicteric, moist mucous membranes  Neck - supple  Lymphatics - not assessed  Chest - clear to auscultation, no wheezes, rales or rhonchi  Heart - normal rate, regular rhythm, normal S1, S2, no murmurs, rubs, clicks or gallops  Abdomen - soft, nontender, nondistended  Back exam - full range of motion, no tenderness  Neurological - cranial nerves II through XII grossly intact, no focal deficit  Musculoskeletal - no muscular tenderness noted, normal strength  Extremities - peripheral pulses normal, no pedal edema  Skin - normal coloration  no rashes    12 lead ECG: NSR    Recent Labs:  Lab Results   Component Value Date/Time    Cholesterol, total 210 (H) 08/29/2019 09:59 AM    HDL Cholesterol 72 08/29/2019 09:59 AM    LDL, calculated 112 (H) 08/29/2019 09:59 AM    Triglyceride 131 08/29/2019 09:59 AM    CHOL/HDL Ratio 3.0 06/15/2015 06:22 AM     Lab Results   Component Value Date/Time    Creatinine 0.61 09/30/2020 03:41 PM     Lab Results   Component Value Date/Time    BUN 14 09/30/2020 03:41 PM     Lab Results   Component Value Date/Time    Potassium 4.5 09/30/2020 03:41 PM     No results found for: HBA1C, HGBE8, CZW0KVJX, VZX4ZRXL  Lab Results   Component Value Date/Time    HGB 14.4 09/30/2020 03:41 PM     Lab Results   Component Value Date/Time    PLATELET 120 17/56/0091 03:41 PM       Reviewed:  Past Medical History:   Diagnosis Date    Akathisia     [facility]     DVT (deep venous thrombosis) ([facility]     ETOH abuse     Hypertension     Shingles      Social History     Tobacco Use   Smoking Status Never Smoker   Smokeless Tobacco Never Used     Social History     Substance and Sexual Activity   Alcohol Use Yes    Alcohol/week: 2.0 standard drinks    Types: 2 Glasses of wine per week     Allergies   Allergen Reactions    Chocolate [Cocoa] Hives    Peanut Other (comments)       Current Outpatient Medications   Medication Sig    metoprolol succinate (TOPROL-XL) 25 mg XL tablet Take 1 Tab by mouth every evening.  miscellaneous medical supply (Blood Pressure Cuff) misc Use daily to check BP    gabapentin (Neurontin) 100 mg capsule Take 2 Caps by mouth three (3) times daily.  cholecalciferol (VITAMIN D3) (2,000 UNITS /50 MCG) cap capsule Take 2,000 Units by mouth daily.  folic acid (FOLVITE) 1 mg tablet Take 1 Tab by mouth daily.  thiamine HCL (VITAMIN B-1) 100 mg tablet Take 1 Tab by mouth daily.     citalopram (CELEXA) 10 mg tablet Take  by mouth daily.  benztropine (COGENTIN) 0.5 mg tablet Take 0.5 mg by mouth nightly.  lamoTRIgine (LAMICTAL) 25 mg tablet Take 75 mg by mouth daily. No current facility-administered medications for this visit.         Corrie Sosa MD  [facility] [address] [place] [address] , [address] [address]  [facility], [facility]  [phone]

## 2020-10-19 NOTE — PATIENT INSTRUCTIONS
Please bring your BP monitor to your testing appointment so we can check its accuracy Please drink 4-5 bottles of water daily and eat soup twice/week to keep your sodium level up You should receive a heart monitor in the mail in 5-7 days, please wear it 24/7 for two weeks and then mail it back in Please stop metoprolol tartrate and begin metoprolol succinate 25mg in the evening Please consider having a coronary calcium scan (heart scan) done to look for evidence of early plaque in the arteries of your heart. You should call [phone] to schedule this. This test is not covered by insurance and will cost you approximately $129 this month. Please let the imaging center know that you are a patient of Dr. Ericka Beach so they can send her the results.

## 2020-11-04 ENCOUNTER — CLINICAL SUPPORT (OUTPATIENT)
Dept: CARDIOLOGY CLINIC | Age: 60
End: 2020-11-04

## 2020-11-04 ENCOUNTER — ANCILLARY PROCEDURE (OUTPATIENT)
Dept: CARDIOLOGY CLINIC | Age: 60
End: 2020-11-04
Payer: MEDICAID

## 2020-11-04 VITALS — SYSTOLIC BLOOD PRESSURE: 112 MMHG | DIASTOLIC BLOOD PRESSURE: 77 MMHG

## 2020-11-04 VITALS
SYSTOLIC BLOOD PRESSURE: 120 MMHG | HEIGHT: 66 IN | DIASTOLIC BLOOD PRESSURE: 80 MMHG | WEIGHT: 100 LBS | BODY MASS INDEX: 16.07 KG/M2

## 2020-11-04 DIAGNOSIS — R42 DIZZINESS: ICD-10-CM

## 2020-11-04 DIAGNOSIS — R55 NEAR SYNCOPE: ICD-10-CM

## 2020-11-04 DIAGNOSIS — I95.1 ORTHOSTATIC HYPOTENSION: Primary | ICD-10-CM

## 2020-11-04 PROCEDURE — 93306 TTE W/DOPPLER COMPLETE: CPT | Performed by: INTERNAL MEDICINE

## 2020-11-04 RX ORDER — METOPROLOL SUCCINATE 25 MG/1
12.5 TABLET, EXTENDED RELEASE ORAL EVERY EVENING
Qty: 45 TAB | Refills: 1 | Status: SHIPPED | OUTPATIENT
Start: 2020-11-04 | End: 2020-11-19 | Stop reason: SDUPTHER

## 2020-11-04 NOTE — TELEPHONE ENCOUNTER
Requested Prescriptions     Signed Prescriptions Disp Refills    metoprolol succinate (TOPROL-XL) 25 mg XL tablet 45 Tab 1     Sig: Take 0.5 Tabs by mouth every evening.      Authorizing Provider: Elina Burton     Ordering User: Myron Mcfadden verbal orders

## 2020-11-04 NOTE — PROGRESS NOTES
Patient in today for an echocardiogram and to check a rustam BP against her home monitor. Rustam reading 96/80  Automatic cuff 112/77. Patient's arm is very small. BP reviewed with provider. Advised patient to take 1/2 tab of Toprol XL 25 mg (12.5 mg tab)    She has a f/u appt.  Scheduled     Future Appointments   Date Time Provider Malik Grant   11/19/2020  2:40 PM Nakita Jack NP [facility] BS AMB

## 2020-11-05 LAB
ECHO AO ASC DIAM: 2.91 CM
ECHO AO ROOT DIAM: 2.87 CM
ECHO AV AREA PEAK VELOCITY: 1.72 CM2
ECHO AV AREA VTI: 1.87 CM2
ECHO AV AREA/BSA PEAK VELOCITY: 1.2 CM2/M2
ECHO AV AREA/BSA VTI: 1.3 CM2/M2
ECHO AV MEAN GRADIENT: 3.6 MMHG
ECHO AV PEAK GRADIENT: 7.1 MMHG
ECHO AV PEAK VELOCITY: 133.23 CM/S
ECHO AV VTI: 24.41 CM
ECHO LA AREA 4C: 7.73 CM2
ECHO LA MAJOR AXIS: 3.42 CM
ECHO LA MINOR AXIS: 2.3 CM
ECHO LA VOL 2C: 33.4 ML (ref 22–52)
ECHO LA VOL 4C: 14.76 ML (ref 22–52)
ECHO LA VOL BP: 31.16 ML (ref 22–52)
ECHO LA VOL/BSA BIPLANE: 20.92 ML/M2 (ref 16–28)
ECHO LA VOLUME INDEX A2C: 22.42 ML/M2 (ref 16–28)
ECHO LA VOLUME INDEX A4C: 9.91 ML/M2 (ref 16–28)
ECHO LV E' LATERAL VELOCITY: 9.42 CM/S
ECHO LV EDV A2C: 54.71 ML
ECHO LV EDV A4C: 81.17 ML
ECHO LV EDV BP: 66.93 ML (ref 56–104)
ECHO LV EDV INDEX A4C: 54.5 ML/M2
ECHO LV EDV INDEX BP: 44.9 ML/M2
ECHO LV EDV NDEX A2C: 36.7 ML/M2
ECHO LV EJECTION FRACTION A2C: 55 PERCENT
ECHO LV EJECTION FRACTION A4C: 64 PERCENT
ECHO LV EJECTION FRACTION BIPLANE: 59.4 PERCENT (ref 55–100)
ECHO LV ESV A2C: 24.83 ML
ECHO LV ESV A4C: 28.95 ML
ECHO LV ESV BP: 27.19 ML (ref 19–49)
ECHO LV ESV INDEX A2C: 16.7 ML/M2
ECHO LV ESV INDEX A4C: 19.4 ML/M2
ECHO LV ESV INDEX BP: 18.3 ML/M2
ECHO LV INTERNAL DIMENSION DIASTOLIC: 4.23 CM (ref 3.9–5.3)
ECHO LV INTERNAL DIMENSION SYSTOLIC: 2.92 CM
ECHO LV IVSD: 0.95 CM (ref 0.6–0.9)
ECHO LV MASS 2D: 134 G (ref 67–162)
ECHO LV MASS INDEX 2D: 89.9 G/M2 (ref 43–95)
ECHO LV POSTERIOR WALL DIASTOLIC: 1 CM (ref 0.6–0.9)
ECHO LVOT DIAM: 1.62 CM
ECHO LVOT PEAK GRADIENT: 4.95 MMHG
ECHO LVOT PEAK VELOCITY: 111.23 CM/S
ECHO LVOT SV: 45.5 ML
ECHO LVOT VTI: 22.07 CM
ECHO RV INTERNAL DIMENSION: 3.03 CM
ECHO RV TAPSE: 2.13 CM (ref 1.5–2)
LA VOL DISK BP: 27.81 ML (ref 22–52)

## 2020-11-11 ENCOUNTER — DOCUMENTATION ONLY (OUTPATIENT)
Dept: CARDIOLOGY CLINIC | Age: 60
End: 2020-11-11

## 2020-11-16 ENCOUNTER — TELEPHONE (OUTPATIENT)
Dept: CARDIOLOGY CLINIC | Age: 60
End: 2020-11-16

## 2020-11-16 DIAGNOSIS — I47.1 PSVT (PAROXYSMAL SUPRAVENTRICULAR TACHYCARDIA) (HCC): Primary | ICD-10-CM

## 2020-11-17 DIAGNOSIS — I47.1 PSVT (PAROXYSMAL SUPRAVENTRICULAR TACHYCARDIA) (HCC): ICD-10-CM

## 2020-11-17 LAB
ANION GAP SERPL CALC-SCNC: 6 MMOL/L (ref 5–15)
BUN SERPL-MCNC: 10 MG/DL (ref 6–20)
BUN/CREAT SERPL: 14 (ref 12–20)
CALCIUM SERPL-MCNC: 10.1 MG/DL (ref 8.5–10.1)
CHLORIDE SERPL-SCNC: 103 MMOL/L (ref 97–108)
CO2 SERPL-SCNC: 28 MMOL/L (ref 21–32)
CREAT SERPL-MCNC: 0.71 MG/DL (ref 0.55–1.02)
GLUCOSE SERPL-MCNC: 123 MG/DL (ref 65–100)
MAGNESIUM SERPL-MCNC: 1.7 MG/DL (ref 1.6–2.4)
POTASSIUM SERPL-SCNC: 4.5 MMOL/L (ref 3.5–5.1)
SODIUM SERPL-SCNC: 137 MMOL/L (ref 136–145)
T4 FREE SERPL-MCNC: 1 NG/DL (ref 0.8–1.5)
TSH SERPL DL<=0.05 MIU/L-ACNC: 1.3 UIU/ML (ref 0.36–3.74)

## 2020-11-18 RX ORDER — LANOLIN ALCOHOL/MO/W.PET/CERES
400 CREAM (GRAM) TOPICAL DAILY
Qty: 90 TAB | Refills: 1 | Status: ON HOLD | OUTPATIENT
Start: 2020-11-18 | End: 2021-01-30 | Stop reason: SDUPTHER

## 2020-11-19 ENCOUNTER — OFFICE VISIT (OUTPATIENT)
Dept: CARDIOLOGY CLINIC | Age: 60
End: 2020-11-19
Payer: MEDICAID

## 2020-11-19 VITALS
HEART RATE: 103 BPM | OXYGEN SATURATION: 96 % | SYSTOLIC BLOOD PRESSURE: 150 MMHG | RESPIRATION RATE: 20 BRPM | WEIGHT: 100 LBS | BODY MASS INDEX: 16.14 KG/M2 | DIASTOLIC BLOOD PRESSURE: 90 MMHG

## 2020-11-19 DIAGNOSIS — G25.71 AKATHISIA: ICD-10-CM

## 2020-11-19 DIAGNOSIS — I47.1 SVT (SUPRAVENTRICULAR TACHYCARDIA) (HCC): Primary | ICD-10-CM

## 2020-11-19 DIAGNOSIS — I95.1 ORTHOSTATIC HYPOTENSION: ICD-10-CM

## 2020-11-19 DIAGNOSIS — E83.42 HYPOMAGNESEMIA: ICD-10-CM

## 2020-11-19 PROCEDURE — 99214 OFFICE O/P EST MOD 30 MIN: CPT | Performed by: NURSE PRACTITIONER

## 2020-11-19 RX ORDER — MIDODRINE HYDROCHLORIDE 2.5 MG/1
2.5 TABLET ORAL 2 TIMES DAILY
Qty: 14 TAB | Refills: 0 | Status: SHIPPED | OUTPATIENT
Start: 2020-11-19 | End: 2020-12-23

## 2020-11-19 RX ORDER — MIDODRINE HYDROCHLORIDE 2.5 MG/1
2.5 TABLET ORAL 2 TIMES DAILY
Qty: 180 TAB | Refills: 1 | Status: SHIPPED | OUTPATIENT
Start: 2020-11-19 | End: 2020-12-23

## 2020-11-19 RX ORDER — METOPROLOL SUCCINATE 25 MG/1
25 TABLET, EXTENDED RELEASE ORAL EVERY EVENING
Qty: 90 TAB | Refills: 1 | Status: SHIPPED | OUTPATIENT
Start: 2020-11-19 | End: 2020-12-23 | Stop reason: ALTCHOICE

## 2020-11-19 NOTE — PROGRESS NOTES
Reason for consult: orthostatic hypotension  Requesting physician: Tyrel Daly, NP    Pronounced \"Luis Manuel-di\"    HPI: Miranda Park, a 63y.o. year-old who presents for follow up regarding her orthostatic hypotension. Her  Sebastián Rai is here with her today  She continues to have falls and injuries related to her falls  Has tardive dyskinesia from [place], trying to get on Ingressa now to treat TD but needs cardiac clearance for this since Ingressa can cause bradycardia  She was orthostatic at her visit her visit today  /90 lying, 100/70 standing  She is trying to stay hydrated, we discussed adding some sodium to her diet  Has dyspnea with exertion, worse with wearing mask, has been worse the last several months  No PND, naps some during the day   No chest pain or palpitations  Discussed her SVT found on her loop monitor, she mentions that she heard her heart beat in her ears for the first time yesterday  No LE edema     Unclear situation, hard to say how much of her fall risk is TDK vs SVT vs orthostatic hypotension    Sounds like home monitor reads SBP 20 mmHg higher than it actually is      Assessment/Plan:  1. Dizziness/lightheadedness/falls - advised her to stay hydrated, add sodium to her diet, will begin treatment for orthostatic hypotension as below   -no significant bradycardia or arrhythmias on loop monitor 11/20  -TTE WNL  -she will follow up with Dr. Galo Fleming in 1 month  2. Orthostatic hypotension - encouraged her to stay hydrated and increase sodium intake   -will continue Toprol XL 25mg for SVT  -advised her to begin midodrine 2.5mg BID to help keep BP up  -asked her to monitor BP at home  3.   Dyspnea with exertion - will defer stress testing for now due to concerns over suboptimal imaging with her tardive dyskinesia, advised her to have coronary calcium scan to look for evidence of early plaque in the arteries of her heart at the last visit   -if stress testing was needed we would probably have to do DSE but at this time I have a pretty low suspicion for CAD as the cause of her syncope etc.   4.  Tardive dyskinesia - will need cardiac clearance to start Ingressa after cardiac workup is complete   5. SVT - noted on loop monitor per Dr. Karin Weiss, will increase Toprol XL to 25mg in the evening for suppression  6. Hypomagnesemia - Mg 1.7, advised her to begin magnesium oxide 400mg daily      Loop Monitor 11/20 - +SVT    Echo 11/20 - EF 59%, grade 1 dd, no valve abnormalities  Venous duplex 10/20 - no DVT    Fam Hx: father passed at age 68 from pneumonia, had multiple strokes and one heart attack prior to that  Soc Hx: no tobacco use, heavy etoh until a few years ago, now only has 1-2 beers/weekend    She  has a past medical history of Akathisia, Bipolar 1 disorder ([facility] DVT (deep venous thrombosis) ([facility] ETOH abuse, Hypertension, and Shingles. She also has no past medical history of Second hand smoke exposure. Cardiovascular ROS: no chest pain or palpitations   Respiratory ROS: no cough or wheezing  Neurological ROS: no TIA or stroke symptoms  All other systems negative except as above. PE  Vitals:    11/19/20 1445 11/19/20 1453   BP:  (!) 150/90   Pulse:  (!) 103   Resp:  20   SpO2:  96%   Weight: 100 lb (45.4 kg)     Body mass index is 16.14 kg/m².    General appearance - alert, well appearing, and in no distress, + dyskinesia  Mental status - affect appropriate to mood  Eyes - sclera anicteric, moist mucous membranes  Neck - supple  Lymphatics - not assessed  Chest - clear to auscultation, no wheezes, rales or rhonchi  Heart - normal rate, regular rhythm, normal S1, S2, no murmurs, rubs, clicks or gallops  Abdomen - soft, nontender, nondistended  Back exam - full range of motion, no tenderness  Neurological - cranial nerves II through XII grossly intact, no focal deficit  Musculoskeletal - no muscular tenderness noted, normal strength  Extremities - peripheral pulses normal, no pedal edema  Skin - normal coloration  no rashes    Recent Labs:  Lab Results   Component Value Date/Time    Cholesterol, total 210 (H) 08/29/2019 09:59 AM    HDL Cholesterol 72 08/29/2019 09:59 AM    LDL, calculated 112 (H) 08/29/2019 09:59 AM    Triglyceride 131 08/29/2019 09:59 AM    CHOL/HDL Ratio 3.0 06/15/2015 06:22 AM     Lab Results   Component Value Date/Time    Creatinine 0.71 11/17/2020 11:32 AM     Lab Results   Component Value Date/Time    BUN 10 11/17/2020 11:32 AM     Lab Results   Component Value Date/Time    Potassium 4.5 11/17/2020 11:32 AM     No results found for: HBA1C, HGBE8, LVA8JZIV, YQH9LJGS  Lab Results   Component Value Date/Time    HGB 14.4 09/30/2020 03:41 PM     Lab Results   Component Value Date/Time    PLATELET 589 30/93/1438 03:41 PM       Reviewed:  Past Medical History:   Diagnosis Date    Akathisia     [facility]     DVT [facility] thrombosis) ([facility]     ETOH abuse     Hypertension     Shingles      Social History     Tobacco Use   Smoking Status Never Smoker   Smokeless Tobacco Never Used     Social History     Substance and Sexual Activity   Alcohol Use Yes    Alcohol/week: 2.0 standard drinks    Types: 2 Glasses of wine per week     Allergies   Allergen Reactions    Chocolate [Cocoa] Hives    Peanut Other (comments)       Current Outpatient Medications   Medication Sig    metoprolol succinate (TOPROL-XL) 25 mg XL tablet Take 1 Tab by mouth every evening.  midodrine (PROAMATINE) 2.5 mg tablet Take 1 Tab by mouth two (2) times a day.  midodrine (PROAMATINE) 2.5 mg tablet Take 1 Tab by mouth two (2) times a day.  magnesium oxide (MAG-OX) 400 mg tablet Take 1 Tab by mouth daily.  miscellaneous medical supply (Blood Pressure Cuff) misc Use daily to check BP    gabapentin (Neurontin) 100 mg capsule Take 2 Caps by mouth three (3) times daily.  cholecalciferol (VITAMIN D3) (2,000 UNITS /50 MCG) cap capsule Take 2,000 Units by mouth daily.     folic acid (FOLVITE) 1 mg tablet Take 1 Tab by mouth daily.  thiamine HCL (VITAMIN B-1) 100 mg tablet Take 1 Tab by mouth daily.  citalopram (CELEXA) 10 mg tablet Take  by mouth daily.  benztropine (COGENTIN) 0.5 mg tablet Take 0.5 mg by mouth nightly.  lamoTRIgine (LAMICTAL) 25 mg tablet Take 75 mg by mouth daily. No current facility-administered medications for this visit.         Joe De Jesus NP  [facility] [address] [place] [address] [address], [address] [address]  [facility]  [phone]

## 2020-11-19 NOTE — PATIENT INSTRUCTIONS
Please begin taking magnesium oxide 400mg daily for your low magnesium Please increase your Toprol XL to 25mg in the evening to control your fast heart rate Please begin midodrine 2.5mg twice daily to keep your blood pressure up - take the first dose with breakfast and the second dose after lunch time Please check your blood pressure daily (at least one hour after your morning medications.)  Please check it while you are lying down and keep a written record of your blood pressure readings. If your systolic blood pressure is consistently greater than 160mmHg please call our office.

## 2020-11-19 NOTE — TELEPHONE ENCOUNTER
Requested Prescriptions     Pending Prescriptions Disp Refills    gabapentin (Neurontin) 100 mg capsule 270 Cap 1     Sig: Take 2 Caps by mouth three (3) times daily.        [address] [facility],[address]

## 2020-11-24 RX ORDER — GABAPENTIN 100 MG/1
200 CAPSULE ORAL 3 TIMES DAILY
Qty: 270 CAP | Refills: 1 | Status: SHIPPED | OUTPATIENT
Start: 2020-11-24 | End: 2021-03-10 | Stop reason: SDUPTHER

## 2020-11-24 NOTE — TELEPHONE ENCOUNTER
Future Appointments   Date Time Provider Malik Grant   12/23/2020  8:40 AM MD ALENA Swanson BS AMB     Last OV 9/2020

## 2020-12-23 ENCOUNTER — OFFICE VISIT (OUTPATIENT)
Dept: CARDIOLOGY CLINIC | Age: 60
End: 2020-12-23
Payer: MEDICAID

## 2020-12-23 VITALS
BODY MASS INDEX: 16.65 KG/M2 | DIASTOLIC BLOOD PRESSURE: 100 MMHG | SYSTOLIC BLOOD PRESSURE: 140 MMHG | WEIGHT: 103.6 LBS | RESPIRATION RATE: 14 BRPM | OXYGEN SATURATION: 97 % | HEART RATE: 100 BPM | HEIGHT: 66 IN

## 2020-12-23 DIAGNOSIS — R06.09 DOE (DYSPNEA ON EXERTION): ICD-10-CM

## 2020-12-23 DIAGNOSIS — R42 DIZZINESS: ICD-10-CM

## 2020-12-23 DIAGNOSIS — R06.89 DYSPNEA AND RESPIRATORY ABNORMALITY: ICD-10-CM

## 2020-12-23 DIAGNOSIS — I10 ESSENTIAL HYPERTENSION: ICD-10-CM

## 2020-12-23 DIAGNOSIS — R06.09 DYSPNEA ON EXERTION: ICD-10-CM

## 2020-12-23 DIAGNOSIS — E83.42 HYPOMAGNESEMIA: ICD-10-CM

## 2020-12-23 DIAGNOSIS — R06.00 DYSPNEA AND RESPIRATORY ABNORMALITY: ICD-10-CM

## 2020-12-23 DIAGNOSIS — I47.1 SVT (SUPRAVENTRICULAR TACHYCARDIA) (HCC): ICD-10-CM

## 2020-12-23 DIAGNOSIS — E78.2 MIXED HYPERLIPIDEMIA: ICD-10-CM

## 2020-12-23 DIAGNOSIS — R06.02 SHORTNESS OF BREATH: Primary | ICD-10-CM

## 2020-12-23 DIAGNOSIS — R55 NEAR SYNCOPE: ICD-10-CM

## 2020-12-23 DIAGNOSIS — I47.1 PSVT (PAROXYSMAL SUPRAVENTRICULAR TACHYCARDIA) (HCC): ICD-10-CM

## 2020-12-23 DIAGNOSIS — I95.1 ORTHOSTATIC HYPOTENSION: ICD-10-CM

## 2020-12-23 DIAGNOSIS — F31.9 BIPOLAR 1 DISORDER (HCC): ICD-10-CM

## 2020-12-23 DIAGNOSIS — R53.83 FATIGUE, UNSPECIFIED TYPE: ICD-10-CM

## 2020-12-23 PROCEDURE — 99215 OFFICE O/P EST HI 40 MIN: CPT | Performed by: INTERNAL MEDICINE

## 2020-12-23 RX ORDER — DILTIAZEM HYDROCHLORIDE 120 MG/1
120 CAPSULE, COATED, EXTENDED RELEASE ORAL
Qty: 90 CAP | Refills: 1 | Status: SHIPPED | OUTPATIENT
Start: 2020-12-23 | End: 2021-06-30 | Stop reason: SDUPTHER

## 2020-12-23 RX ORDER — DILTIAZEM HYDROCHLORIDE 120 MG/1
CAPSULE, COATED, EXTENDED RELEASE ORAL DAILY
COMMUNITY
End: 2020-12-23 | Stop reason: SDUPTHER

## 2020-12-23 RX ORDER — MIDODRINE HYDROCHLORIDE 2.5 MG/1
2.5 TABLET ORAL DAILY
Qty: 90 TAB | Refills: 1 | Status: SHIPPED | OUTPATIENT
Start: 2020-12-23 | End: 2021-02-24 | Stop reason: SDUPTHER

## 2020-12-23 NOTE — PROGRESS NOTES
Pronounced \"Luis Manuel-di\"    HPI: Jose Antonio Arellano, a 63y.o. year-old who presents for follow up regarding her orthostatic hypotension. She looks very uncomfortable today she is visibly short of breath and using accessory muscles intermittently and then she will quiet down and not be in so much distress. She looks like someone who is having an acute anxiety attack on top of perhaps underlying shortness of breath. Her blood pressure is high she attributes that to walking in here. She also says that the mask makes her very short of breath. Her blood pressure being high she is on the Midrin so we will reduce that to once a day. Her echo looked okay there is no evidence of heart failure or pulmonary hypertension but she has not been evaluated for coronary artery disease. We will go ahead and schedule her for dobutamine stress echo as I do not believe that she can lay still for nuclear imaging. Nor can she walk on the treadmill due to her shortness of breath. I also think she really needs to have pulmonary evaluation for her shortness of breath PFTs are probably in order and will make that referral today. Despite her respiratory distress her oxygen level is 97%    Mask is a major trigger for her as well.    She continues to have falls and injuries related to her falls  Has tardive dyskinesia from [place], trying to get on Fabiola originally here for cardiac clearance because of potential bradycardia she is cleared to start    Still some concern for orthostasis  /90 lying, 100/70 standing last visit  She is trying to stay hydrated, we discussed adding some sodium to her diet  Has dyspnea with exertion, worse with wearing mask, has been worse the last several months  No PND, naps some during the day   No chest pain or palpitations  Discussed her SVT found on her loop monitor, nothing recent  No LE edema     Unclear situation, hard to say how much of her fall risk is TDK vs SVT vs orthostatic hypotension    Sounds like home monitor reads SBP 20 mmHg higher than it actually is based on last office visits numbers    Assessment/Plan:  1. Dizziness/lightheadedness/falls - advised her to stay hydrated, add sodium to her diet, will reduce Midrin to once a day  -no significant bradycardia or arrhythmias on loop monitor 11/20  -TTE WNL  2. Orthostatic hypotension - encouraged her to stay hydrated and increase sodium intake   -Change Toprol to diltiazem  -advised her to begin midodrine 2.5mg BID to help keep BP up  -asked her to monitor BP at home  3. Dyspnea with exertion -proceed with dobutamine stress echo, pulmonary consultation  4. Tardive dyskinesia -cleared to start Ingressa    5. SVT - noted on loop monitor change Toprol to diltiazem  6. Hypomagnesemia - Mg 1.7 replete magnesium oxide 400mg daily      Loop Monitor 11/20 - +SVT    Echo 11/20 - EF 59%, grade 1 dd, no valve abnormalities  Venous duplex 10/20 - no DVT    Fam Hx: father passed at age 68 from pneumonia, had multiple strokes and one heart attack prior to that  Soc Hx: no tobacco use, heavy etoh until a few years ago, now only has 1-2 beers/weekend    She  has a past medical history of Akathisia, Bipolar 1 disorder ([address] DVT (deep venous thrombosis) ([facility] ETOH abuse, Hypertension, and Shingles. She also has no past medical history of Second hand smoke exposure. Cardiovascular ROS: no chest pain or palpitations   Respiratory ROS: no cough or wheezing  Neurological ROS: no TIA or stroke symptoms  All other systems negative except as above. PE  Vitals:    12/23/20 0839   BP: (!) 140/100   Pulse: 100   Resp: 14   SpO2: 97%   Weight: 103 lb 9.6 oz (47 kg)   Height: 5' 6\" (1.676 m)    Body mass index is 16.72 kg/m².    General appearance - alert, well appearing, and in no distress, + dyskinesia  Mental status - affect appropriate to mood  Eyes - sclera anicteric, moist mucous membranes  Neck - supple  Lymphatics - not assessed  Chest - clear to auscultation, no wheezes, rales or rhonchi  Heart - normal rate, regular rhythm, normal S1, S2, no murmurs, rubs, clicks or gallops  Abdomen - soft, nontender, nondistended  Back exam - full range of motion, no tenderness  Neurological - cranial nerves II through XII grossly intact, no focal deficit  Musculoskeletal - no muscular tenderness noted, normal strength  Extremities - peripheral pulses normal, no pedal edema  Skin - normal coloration  no rashes    Recent Labs:  Lab Results   Component Value Date/Time    Cholesterol, total 210 (H) 08/29/2019 09:59 AM    HDL Cholesterol 72 08/29/2019 09:59 AM    LDL, calculated 112 (H) 08/29/2019 09:59 AM    Triglyceride 131 08/29/2019 09:59 AM    CHOL/HDL Ratio 3.0 06/15/2015 06:22 AM     Lab Results   Component Value Date/Time    Creatinine 0.71 11/17/2020 11:32 AM     Lab Results   Component Value Date/Time    BUN 10 11/17/2020 11:32 AM     Lab Results   Component Value Date/Time    Potassium 4.5 11/17/2020 11:32 AM     No results found for: HBA1C, HGBE8, NPR3GMSZ, EUQ6FLQA  Lab Results   Component Value Date/Time    HGB 14.4 09/30/2020 03:41 PM     Lab Results   Component Value Date/Time    PLATELET 933 66/45/4656 03:41 PM       Reviewed:  Past Medical History:   Diagnosis Date    Akathisia     B[facility] 1 disorder ([facility]     [facility]     ETOH abuse     Hypertension     Shingles      Social History     Tobacco Use   Smoking Status Never Smoker   Smokeless Tobacco Never Used     Social History     Substance and Sexual Activity   Alcohol Use Yes    Alcohol/week: 2.0 standard drinks    Types: 2 Glasses of wine per week    Frequency: Monthly or less    Drinks per session: 1 or 2    Binge frequency: Never     Allergies   Allergen Reactions    Chocolate [Cocoa] Hives    Peanut Other (comments)       Current Outpatient Medications   Medication Sig    gabapentin (Neurontin) 100 mg capsule Take 2 Caps by mouth three (3) times daily.  metoprolol succinate (TOPROL-XL) 25 mg XL tablet Take 1 Tab by mouth every evening.  midodrine (PROAMATINE) 2.5 mg tablet Take 1 Tab by mouth two (2) times a day.  midodrine (PROAMATINE) 2.5 mg tablet Take 1 Tab by mouth two (2) times a day.  magnesium oxide (MAG-OX) 400 mg tablet Take 1 Tab by mouth daily.  miscellaneous medical supply (Blood Pressure Cuff) misc Use daily to check BP    cholecalciferol (VITAMIN D3) (2,000 UNITS /50 MCG) cap capsule Take 2,000 Units by mouth daily.  folic acid (FOLVITE) 1 mg tablet Take 1 Tab by mouth daily.  thiamine HCL (VITAMIN B-1) 100 mg tablet Take 1 Tab by mouth daily.  citalopram (CELEXA) 10 mg tablet Take  by mouth daily.  benztropine (COGENTIN) 0.5 mg tablet Take 0.5 mg by mouth nightly.  lamoTRIgine (LAMICTAL) 25 mg tablet Take 75 mg by mouth daily. No current facility-administered medications for this visit.         Tj Marrero MD  [facility] [address] [place] [address] [address], [address] [facility] [address]  [address]  [phone]

## 2020-12-23 NOTE — PATIENT INSTRUCTIONS
Stop Metoprolol and start Diltiazem 120 mg at bedtime Decrease Midodrine 2.5 mg to once daily Future Appointments Date Time Provider Malik Garretti 2/24/2021  1:00 PM Minoo Jack NP CAVREY BS AMB  
4/27/2021 10:20 AM MD ALENA Byrnes AMB

## 2020-12-31 RX ORDER — ACETAMINOPHEN 500 MG
2000 TABLET ORAL DAILY
Qty: 90 CAP | Refills: 3 | Status: SHIPPED | OUTPATIENT
Start: 2020-12-31 | End: 2022-06-17 | Stop reason: ALTCHOICE

## 2021-01-08 ENCOUNTER — HOSPITAL ENCOUNTER (OUTPATIENT)
Dept: GENERAL RADIOLOGY | Age: 61
Discharge: HOME | End: 2021-01-08
Attending: INTERNAL MEDICINE
Payer: MEDICAID

## 2021-01-08 ENCOUNTER — TRANSCRIBE ORDER (OUTPATIENT)
Dept: REGISTRATION | Age: 61
End: 2021-01-08

## 2021-01-08 DIAGNOSIS — R06.02 SOB (SHORTNESS OF BREATH): Primary | ICD-10-CM

## 2021-01-08 DIAGNOSIS — R06.02 SOB (SHORTNESS OF BREATH): ICD-10-CM

## 2021-01-08 PROCEDURE — 71046 X-RAY EXAM CHEST 2 VIEWS: CPT

## 2021-01-27 ENCOUNTER — APPOINTMENT (OUTPATIENT)
Dept: GENERAL RADIOLOGY | Age: 61
End: 2021-01-27
Attending: EMERGENCY MEDICINE
Payer: MEDICAID

## 2021-01-27 ENCOUNTER — HOSPITAL ENCOUNTER (OUTPATIENT)
Age: 61
Setting detail: OBSERVATION
Discharge: HOME HEALTH | End: 2021-01-30
Attending: EMERGENCY MEDICINE | Admitting: FAMILY MEDICINE
Payer: MEDICAID

## 2021-01-27 ENCOUNTER — APPOINTMENT (OUTPATIENT)
Dept: CT IMAGING | Age: 61
End: 2021-01-27
Attending: EMERGENCY MEDICINE
Payer: MEDICAID

## 2021-01-27 DIAGNOSIS — F10.11 HISTORY OF ALCOHOL ABUSE: ICD-10-CM

## 2021-01-27 DIAGNOSIS — I47.1 PSVT (PAROXYSMAL SUPRAVENTRICULAR TACHYCARDIA) (HCC): ICD-10-CM

## 2021-01-27 DIAGNOSIS — F10.10 ALCOHOL ABUSE: ICD-10-CM

## 2021-01-27 DIAGNOSIS — R56.9 SEIZURE (HCC): Primary | ICD-10-CM

## 2021-01-27 PROBLEM — F10.939 ALCOHOL WITHDRAWAL (HCC): Status: ACTIVE | Noted: 2021-01-27

## 2021-01-27 LAB
ALBUMIN SERPL-MCNC: 4.2 G/DL (ref 3.5–5)
ALBUMIN/GLOB SERPL: 1.1 {RATIO} (ref 1.1–2.2)
ALP SERPL-CCNC: 120 U/L (ref 45–117)
ALT SERPL-CCNC: 31 U/L (ref 12–78)
AMORPH CRY URNS QL MICRO: ABNORMAL
AMPHET UR QL SCN: NEGATIVE
ANION GAP SERPL CALC-SCNC: 12 MMOL/L (ref 5–15)
APPEARANCE UR: ABNORMAL
AST SERPL-CCNC: 32 U/L (ref 15–37)
BACTERIA URNS QL MICRO: ABNORMAL /HPF
BARBITURATES UR QL SCN: NEGATIVE
BASOPHILS # BLD: 0 K/UL (ref 0–0.1)
BASOPHILS NFR BLD: 0 % (ref 0–1)
BENZODIAZ UR QL: NEGATIVE
BILIRUB SERPL-MCNC: 0.8 MG/DL (ref 0.2–1)
BILIRUB UR QL: NEGATIVE
BUN SERPL-MCNC: 7 MG/DL (ref 6–20)
BUN/CREAT SERPL: 10 (ref 12–20)
CALCIUM SERPL-MCNC: 9.9 MG/DL (ref 8.5–10.1)
CANNABINOIDS UR QL SCN: NEGATIVE
CHLORIDE SERPL-SCNC: 101 MMOL/L (ref 97–108)
CO2 SERPL-SCNC: 24 MMOL/L (ref 21–32)
COCAINE UR QL SCN: NEGATIVE
COLOR UR: ABNORMAL
COMMENT, HOLDF: NORMAL
CREAT SERPL-MCNC: 0.71 MG/DL (ref 0.55–1.02)
DIFFERENTIAL METHOD BLD: NORMAL
DRUG SCRN COMMENT,DRGCM: NORMAL
EOSINOPHIL # BLD: 0 K/UL (ref 0–0.4)
EOSINOPHIL NFR BLD: 0 % (ref 0–7)
EPITH CASTS URNS QL MICRO: ABNORMAL /LPF
ERYTHROCYTE [DISTWIDTH] IN BLOOD BY AUTOMATED COUNT: 12.8 % (ref 11.5–14.5)
ETHANOL SERPL-MCNC: <10 MG/DL
GLOBULIN SER CALC-MCNC: 4 G/DL (ref 2–4)
GLUCOSE SERPL-MCNC: 172 MG/DL (ref 65–100)
GLUCOSE UR STRIP.AUTO-MCNC: NEGATIVE MG/DL
HCT VFR BLD AUTO: 41.4 % (ref 35–47)
HGB BLD-MCNC: 14.4 G/DL (ref 11.5–16)
HGB UR QL STRIP: NEGATIVE
IMM GRANULOCYTES # BLD AUTO: 0 K/UL (ref 0–0.04)
IMM GRANULOCYTES NFR BLD AUTO: 0 % (ref 0–0.5)
KETONES UR QL STRIP.AUTO: 15 MG/DL
LEUKOCYTE ESTERASE UR QL STRIP.AUTO: NEGATIVE
LYMPHOCYTES # BLD: 0.9 K/UL (ref 0.8–3.5)
LYMPHOCYTES NFR BLD: 18 % (ref 12–49)
MAGNESIUM SERPL-MCNC: 1.4 MG/DL (ref 1.6–2.4)
MCH RBC QN AUTO: 32.3 PG (ref 26–34)
MCHC RBC AUTO-ENTMCNC: 34.8 G/DL (ref 30–36.5)
MCV RBC AUTO: 92.8 FL (ref 80–99)
METHADONE UR QL: NEGATIVE
MONOCYTES # BLD: 0.4 K/UL (ref 0–1)
MONOCYTES NFR BLD: 7 % (ref 5–13)
NEUTS SEG # BLD: 3.7 K/UL (ref 1.8–8)
NEUTS SEG NFR BLD: 75 % (ref 32–75)
NITRITE UR QL STRIP.AUTO: NEGATIVE
NRBC # BLD: 0 K/UL (ref 0–0.01)
NRBC BLD-RTO: 0 PER 100 WBC
OPIATES UR QL: NEGATIVE
PCP UR QL: NEGATIVE
PH UR STRIP: 7.5 [PH] (ref 5–8)
PLATELET # BLD AUTO: 155 K/UL (ref 150–400)
PMV BLD AUTO: 9.1 FL (ref 8.9–12.9)
POTASSIUM SERPL-SCNC: 3.3 MMOL/L (ref 3.5–5.1)
PROT SERPL-MCNC: 8.2 G/DL (ref 6.4–8.2)
PROT UR STRIP-MCNC: 100 MG/DL
RBC # BLD AUTO: 4.46 M/UL (ref 3.8–5.2)
RBC #/AREA URNS HPF: ABNORMAL /HPF (ref 0–5)
SAMPLES BEING HELD,HOLD: NORMAL
SODIUM SERPL-SCNC: 137 MMOL/L (ref 136–145)
SP GR UR REFRACTOMETRY: 1.01 (ref 1–1.03)
TROPONIN I SERPL-MCNC: 0.1 NG/ML
UR CULT HOLD, URHOLD: NORMAL
UROBILINOGEN UR QL STRIP.AUTO: 0.2 EU/DL (ref 0.2–1)
WBC # BLD AUTO: 5 K/UL (ref 3.6–11)
WBC URNS QL MICRO: ABNORMAL /HPF (ref 0–4)

## 2021-01-27 PROCEDURE — 36415 COLL VENOUS BLD VENIPUNCTURE: CPT

## 2021-01-27 PROCEDURE — 71045 X-RAY EXAM CHEST 1 VIEW: CPT

## 2021-01-27 PROCEDURE — 82077 ASSAY SPEC XCP UR&BREATH IA: CPT

## 2021-01-27 PROCEDURE — 84484 ASSAY OF TROPONIN QUANT: CPT

## 2021-01-27 PROCEDURE — 83735 ASSAY OF MAGNESIUM: CPT

## 2021-01-27 PROCEDURE — 65660000000 HC RM CCU STEPDOWN

## 2021-01-27 PROCEDURE — 85025 COMPLETE CBC W/AUTO DIFF WBC: CPT

## 2021-01-27 PROCEDURE — 74011250636 HC RX REV CODE- 250/636: Performed by: EMERGENCY MEDICINE

## 2021-01-27 PROCEDURE — 74011000258 HC RX REV CODE- 258: Performed by: EMERGENCY MEDICINE

## 2021-01-27 PROCEDURE — 80307 DRUG TEST PRSMV CHEM ANLYZR: CPT

## 2021-01-27 PROCEDURE — 99218 HC RM OBSERVATION: CPT

## 2021-01-27 PROCEDURE — 70450 CT HEAD/BRAIN W/O DYE: CPT

## 2021-01-27 PROCEDURE — 80053 COMPREHEN METABOLIC PANEL: CPT

## 2021-01-27 PROCEDURE — 93005 ELECTROCARDIOGRAM TRACING: CPT

## 2021-01-27 PROCEDURE — 96365 THER/PROPH/DIAG IV INF INIT: CPT

## 2021-01-27 PROCEDURE — 96375 TX/PRO/DX INJ NEW DRUG ADDON: CPT

## 2021-01-27 PROCEDURE — 96366 THER/PROPH/DIAG IV INF ADDON: CPT

## 2021-01-27 PROCEDURE — 81001 URINALYSIS AUTO W/SCOPE: CPT

## 2021-01-27 PROCEDURE — 99285 EMERGENCY DEPT VISIT HI MDM: CPT

## 2021-01-27 PROCEDURE — 96368 THER/DIAG CONCURRENT INF: CPT

## 2021-01-27 PROCEDURE — 74011000250 HC RX REV CODE- 250: Performed by: EMERGENCY MEDICINE

## 2021-01-27 RX ORDER — ENOXAPARIN SODIUM 100 MG/ML
40 INJECTION SUBCUTANEOUS DAILY
Status: DISCONTINUED | OUTPATIENT
Start: 2021-01-28 | End: 2021-01-30 | Stop reason: HOSPADM

## 2021-01-27 RX ORDER — FOLIC ACID 1 MG/1
1 TABLET ORAL DAILY
Qty: 90 TAB | Refills: 3 | Status: SHIPPED | OUTPATIENT
Start: 2021-01-27 | End: 2022-06-17 | Stop reason: ALTCHOICE

## 2021-01-27 RX ORDER — LORAZEPAM 2 MG/ML
1 INJECTION INTRAMUSCULAR
Status: COMPLETED | OUTPATIENT
Start: 2021-01-27 | End: 2021-01-27

## 2021-01-27 RX ORDER — DIAZEPAM 10 MG/2ML
10 INJECTION INTRAMUSCULAR
Status: DISCONTINUED | OUTPATIENT
Start: 2021-01-27 | End: 2021-01-30 | Stop reason: HOSPADM

## 2021-01-27 RX ORDER — ACETAMINOPHEN 650 MG/1
650 SUPPOSITORY RECTAL
Status: DISCONTINUED | OUTPATIENT
Start: 2021-01-27 | End: 2021-01-30 | Stop reason: HOSPADM

## 2021-01-27 RX ORDER — DIAZEPAM 10 MG/2ML
20 INJECTION INTRAMUSCULAR
Status: DISCONTINUED | OUTPATIENT
Start: 2021-01-27 | End: 2021-01-30 | Stop reason: HOSPADM

## 2021-01-27 RX ORDER — SODIUM CHLORIDE 0.9 % (FLUSH) 0.9 %
5-40 SYRINGE (ML) INJECTION EVERY 8 HOURS
Status: DISCONTINUED | OUTPATIENT
Start: 2021-01-27 | End: 2021-01-30 | Stop reason: HOSPADM

## 2021-01-27 RX ORDER — ACETAMINOPHEN 325 MG/1
650 TABLET ORAL
Status: DISCONTINUED | OUTPATIENT
Start: 2021-01-27 | End: 2021-01-30 | Stop reason: HOSPADM

## 2021-01-27 RX ORDER — SODIUM CHLORIDE 0.9 % (FLUSH) 0.9 %
5-40 SYRINGE (ML) INJECTION AS NEEDED
Status: DISCONTINUED | OUTPATIENT
Start: 2021-01-27 | End: 2021-01-30 | Stop reason: HOSPADM

## 2021-01-27 RX ADMIN — Medication 10 ML: at 22:40

## 2021-01-27 RX ADMIN — FOLIC ACID: 5 INJECTION, SOLUTION INTRAMUSCULAR; INTRAVENOUS; SUBCUTANEOUS at 18:38

## 2021-01-27 RX ADMIN — LEVETIRACETAM 1500 MG: 100 INJECTION, SOLUTION INTRAVENOUS at 19:13

## 2021-01-27 RX ADMIN — LORAZEPAM 1 MG: 2 INJECTION INTRAMUSCULAR; INTRAVENOUS at 19:01

## 2021-01-27 NOTE — TELEPHONE ENCOUNTER
Requested Prescriptions     Pending Prescriptions Disp Refills   • folic acid (FOLVITE) 1 mg tablet 90 Tab 3     Sig: Take 1 Tab by mouth daily.

## 2021-01-27 NOTE — Clinical Note
Status[de-identified] INPATIENT [101]   Type of Bed: Neuro/Stroke [9]   Inpatient Hospitalization Certified Necessary for the Following Reasons: 3. Patient receiving treatment that can only be provided in an inpatient setting (further clarification in H&P documentation)   Admitting Diagnosis: Seizure ([facility] [633712]   Admitting Diagnosis: Alcohol withdrawal ([facility] [address] [291.81. ICD-9-CM]   Admitting Physician: Vera Payment [[id number]]   Attending Physician: Vera Payment [[id number]]   Estimated Length of Stay: 3-4 Midnights   Discharge Plan[de-identified] Other (Specify)

## 2021-01-27 NOTE — ED PROVIDER NOTES
This is a 79-year-old female with a history of bipolar disorder, alcohol abuse and hypertension. She claims that she has had several seizures in the last several weeks and is due to see neurology for the same. She is unable to name the neurologist that she allegedly has an appointment with. It is also noted by record that she is got a history of alcohol abuse. It is unclear whether she has had seizures as a result of alcohol withdrawal.  The patient states that today she was walking towards the kitchen with a Rollator and had some 7 or so episodes of nausea and vomiting. She is unable to tell me exactly why she is here, but EMS states that she was on a Zoom call with either psychiatry or home health and was witnessed to have what was described as a generalized seizure. It is unclear in terms of what time the seizure occurred and how long it lasted. The patient states that there is someone at home who has witnessed some of these other spells. She voices no complaint of temperature, fever or chills and has had no diarrhea. Patient denies any pain or shortness of breath. There is been no cough or congestion she voices no other complaint. Past Medical History:   Diagnosis Date    Akathisia     Bipolar 1 disorder ([facility] [address]     [facility]) [facility]     ETOH abuse     Hypertension     Shingles        Past Surgical History:   Procedure Laterality Date    HX ORTHOPAEDIC      Broken hip R/Neck surgery.           Family History:   Problem Relation Age of Onset    Diabetes Mother        Social History     Socioeconomic History    Marital status: SINGLE     Spouse name: Not on file    Number of children: Not on file    Years of education: Not on file    Highest education level: Not on file   Occupational History    Not on file   Social Needs    Financial resource strain: Not on file    Food insecurity     Worry: Not on file     Inability: Not on file   [employer] needs Medical: Not on file     Non-medical: Not on file   Tobacco Use    Smoking status: Never Smoker    Smokeless tobacco: Never Used   Substance and Sexual Activity    Alcohol use: Yes     Alcohol/week: 2.0 standard drinks     Types: 2 Glasses of wine per week     Frequency: Monthly or less     Drinks per session: 1 or 2     Binge frequency: Never    Drug use: No    Sexual activity: Not on file   Lifestyle    Physical activity     Days per week: Not on file     Minutes per session: Not on file    Stress: Not on file   Relationships    Social connections     Talks on phone: Not on file     Gets together: Not on file     Attends Yarsani service: Not on file     Active member of club or organization: Not on file     Attends meetings of clubs or organizations: Not on file     Relationship status: Not on file    Intimate partner violence     Fear of current or ex partner: Not on file     Emotionally abused: Not on file     Physically abused: Not on file     Forced sexual activity: Not on file   Other Topics Concern    Not on file   Social History Narrative    Not on file         ALLERGIES: Chocolate [cocoa] and Peanut    Review of Systems   Constitutional: Negative for activity change, appetite change and fatigue. HENT: Negative for ear pain, facial swelling, sore throat and trouble swallowing. Eyes: Negative for pain, discharge and visual disturbance. Respiratory: Negative for chest tightness, shortness of breath and wheezing. Cardiovascular: Negative for chest pain and palpitations. Gastrointestinal: Negative for abdominal pain, blood in stool, nausea and vomiting. Genitourinary: Negative for difficulty urinating, flank pain and hematuria. Musculoskeletal: Negative for arthralgias, joint swelling, myalgias and neck pain. Skin: Negative for color change and rash. Neurological: Positive for seizures (Possible seizure per EMS. ). Negative for dizziness, weakness, numbness and headaches. Hematological: Negative for adenopathy. Does not bruise/bleed easily. Psychiatric/Behavioral: Negative for behavioral problems, confusion and sleep disturbance. All other systems reviewed and are negative. Vitals:    01/27/21 1726 01/27/21 1741   BP: (!) 164/90    Pulse: 97    Resp: 21    Temp:  98.4 °F (36.9 °C)   SpO2: 96%    Weight: 52.8 kg (116 lb 6.5 oz)    Height: 5' 6\" (1.676 m)             Physical Exam  Vitals signs and nursing note reviewed. Constitutional:       General: She is in acute distress. Appearance: She is well-developed. She is diaphoretic. She is not ill-appearing or toxic-appearing. Comments: Vital signs are as noted. Patient is alert but somewhat confused. She has no focal neurologic deficits. We are not sure exactly when the alleged event occurred while the patient was on was in the call with her therapist.  Trying to obtain that information. HENT:      Head: Normocephalic and atraumatic. Nose: Nose normal.   Eyes:      General: No scleral icterus. Conjunctiva/sclera: Conjunctivae normal.      Pupils: Pupils are equal, round, and reactive to light. Neck:      Musculoskeletal: Normal range of motion and neck supple. Thyroid: No thyromegaly. Vascular: No JVD. Trachea: No tracheal deviation. Comments: No carotid bruits noted. Cardiovascular:      Rate and Rhythm: Normal rate and regular rhythm. Heart sounds: Normal heart sounds. No murmur. No friction rub. No gallop. Pulmonary:      Effort: Pulmonary effort is normal. No respiratory distress. Breath sounds: Normal breath sounds. No wheezing or rales. Chest:      Chest wall: No tenderness. Abdominal:      General: Bowel sounds are normal. There is no distension. Palpations: Abdomen is soft. There is no mass. Tenderness: There is no abdominal tenderness. There is no guarding or rebound. Musculoskeletal: Normal range of motion. General: No tenderness. Lymphadenopathy:      Cervical: No cervical adenopathy. Skin:     General: Skin is warm. Capillary Refill: Capillary refill takes 2 to 3 seconds. Findings: No erythema or rash. Neurological:      General: No focal deficit present. Mental Status: She is alert and oriented to person, place, and time. Cranial Nerves: No cranial nerve deficit. Coordination: Coordination normal.      Deep Tendon Reflexes: Reflexes are normal and symmetric. Psychiatric:         Behavior: Behavior normal.         Thought Content: Thought content normal.         Judgment: Judgment normal.          MDM  Number of Diagnoses or Management Options     Amount and/or Complexity of Data Reviewed  Clinical lab tests: ordered and reviewed  Tests in the radiology section of CPT®: ordered and reviewed  Decide to obtain previous medical records or to obtain history from someone other than the patient: yes  Review and summarize past medical records: yes  Discuss the patient with other providers: yes  Independent visualization of images, tracings, or specimens: yes    Risk of Complications, Morbidity, and/or Mortality  Presenting problems: high  Diagnostic procedures: high  Management options: high    Patient Progress  Patient progress: stable         Procedures     ED MD EKG interpretation: Normal sinus rhythm at 78 beats a minute. Leftward axis. Nonspecific ST and T wave changes particularly anteriorly. Will obtain old tracing for comparison. 6:47 PM the nurse just came from the patient's room and noted that she was having a seizure-like event. The patient is more confused at this point and is nonresponsive. So it appears clear that this patient did have a seizure earlier at home. There is no history of seizures so she will require admission for further evaluation and treatment. Patient is given a dose of Ativan and also loading dose of Keppra. She will require admission to hospitalist service.     Perfect Serve Consult for Admission  7:37 PM    ED Room Number: ER08/08  Patient Name and age:  Kandy Mcburney 63 y.o.  female  Working Diagnosis:   1. Seizure ([facility] [address]    2. Alcohol abuse    3.  PSVT (paroxysmal supraventricular tachycardia) ([facility]        COVID-19 Suspicion:  no  Sepsis present:  no  Reassessment needed: no  Code Status:  Full Code  Readmission: no  Isolation Requirements:  no  Recommended Level of Care:  telemetry  Department:[facility] Adult ED - 21   Other:

## 2021-01-28 LAB
ANION GAP SERPL CALC-SCNC: 10 MMOL/L (ref 5–15)
ATRIAL RATE: 78 BPM
ATRIAL RATE: 78 BPM
ATRIAL RATE: 80 BPM
BASOPHILS # BLD: 0 K/UL (ref 0–0.1)
BASOPHILS NFR BLD: 0 % (ref 0–1)
BUN SERPL-MCNC: 5 MG/DL (ref 6–20)
BUN/CREAT SERPL: 11 (ref 12–20)
CALCIUM SERPL-MCNC: 8.8 MG/DL (ref 8.5–10.1)
CALCULATED P AXIS, ECG09: 52 DEGREES
CALCULATED P AXIS, ECG09: 54 DEGREES
CALCULATED P AXIS, ECG09: 55 DEGREES
CALCULATED R AXIS, ECG10: 25 DEGREES
CALCULATED R AXIS, ECG10: 28 DEGREES
CALCULATED R AXIS, ECG10: 8 DEGREES
CALCULATED T AXIS, ECG11: 54 DEGREES
CALCULATED T AXIS, ECG11: 63 DEGREES
CALCULATED T AXIS, ECG11: 64 DEGREES
CHLORIDE SERPL-SCNC: 105 MMOL/L (ref 97–108)
CO2 SERPL-SCNC: 24 MMOL/L (ref 21–32)
COMMENT, HOLDF: NORMAL
COVID-19 RAPID TEST, COVR: NOT DETECTED
CREAT SERPL-MCNC: 0.46 MG/DL (ref 0.55–1.02)
DIAGNOSIS, 93000: NORMAL
DIFFERENTIAL METHOD BLD: ABNORMAL
EOSINOPHIL # BLD: 0 K/UL (ref 0–0.4)
EOSINOPHIL NFR BLD: 1 % (ref 0–7)
ERYTHROCYTE [DISTWIDTH] IN BLOOD BY AUTOMATED COUNT: 12.8 % (ref 11.5–14.5)
GLUCOSE SERPL-MCNC: 97 MG/DL (ref 65–100)
HCT VFR BLD AUTO: 35.1 % (ref 35–47)
HGB BLD-MCNC: 12.2 G/DL (ref 11.5–16)
IMM GRANULOCYTES # BLD AUTO: 0 K/UL (ref 0–0.04)
IMM GRANULOCYTES NFR BLD AUTO: 0 % (ref 0–0.5)
LYMPHOCYTES # BLD: 1.5 K/UL (ref 0.8–3.5)
LYMPHOCYTES NFR BLD: 34 % (ref 12–49)
MAGNESIUM SERPL-MCNC: 1.7 MG/DL (ref 1.6–2.4)
MCH RBC QN AUTO: 32.3 PG (ref 26–34)
MCHC RBC AUTO-ENTMCNC: 34.8 G/DL (ref 30–36.5)
MCV RBC AUTO: 92.9 FL (ref 80–99)
MONOCYTES # BLD: 0.5 K/UL (ref 0–1)
MONOCYTES NFR BLD: 10 % (ref 5–13)
NEUTS SEG # BLD: 2.4 K/UL (ref 1.8–8)
NEUTS SEG NFR BLD: 55 % (ref 32–75)
NRBC # BLD: 0 K/UL (ref 0–0.01)
NRBC BLD-RTO: 0 PER 100 WBC
P-R INTERVAL, ECG05: 170 MS
P-R INTERVAL, ECG05: 174 MS
P-R INTERVAL, ECG05: 174 MS
PLATELET # BLD AUTO: 144 K/UL (ref 150–400)
PMV BLD AUTO: 9.3 FL (ref 8.9–12.9)
POTASSIUM SERPL-SCNC: 3.2 MMOL/L (ref 3.5–5.1)
Q-T INTERVAL, ECG07: 412 MS
Q-T INTERVAL, ECG07: 412 MS
Q-T INTERVAL, ECG07: 426 MS
QRS DURATION, ECG06: 86 MS
QRS DURATION, ECG06: 90 MS
QRS DURATION, ECG06: 90 MS
QTC CALCULATION (BEZET), ECG08: 469 MS
QTC CALCULATION (BEZET), ECG08: 475 MS
QTC CALCULATION (BEZET), ECG08: 485 MS
RBC # BLD AUTO: 3.78 M/UL (ref 3.8–5.2)
SAMPLES BEING HELD,HOLD: NORMAL
SARS-COV-2, COV2: NORMAL
SODIUM SERPL-SCNC: 139 MMOL/L (ref 136–145)
SOURCE, COVRS: NORMAL
TROPONIN I SERPL-MCNC: 0.14 NG/ML
TROPONIN I SERPL-MCNC: 0.29 NG/ML
TROPONIN I SERPL-MCNC: 0.38 NG/ML
VENTRICULAR RATE, ECG03: 78 BPM
VENTRICULAR RATE, ECG03: 78 BPM
VENTRICULAR RATE, ECG03: 80 BPM
WBC # BLD AUTO: 4.3 K/UL (ref 3.6–11)

## 2021-01-28 PROCEDURE — 74011000258 HC RX REV CODE- 258: Performed by: FAMILY MEDICINE

## 2021-01-28 PROCEDURE — 97161 PT EVAL LOW COMPLEX 20 MIN: CPT

## 2021-01-28 PROCEDURE — 87635 SARS-COV-2 COVID-19 AMP PRB: CPT

## 2021-01-28 PROCEDURE — 96375 TX/PRO/DX INJ NEW DRUG ADDON: CPT

## 2021-01-28 PROCEDURE — 95816 EEG AWAKE AND DROWSY: CPT | Performed by: FAMILY MEDICINE

## 2021-01-28 PROCEDURE — 93005 ELECTROCARDIOGRAM TRACING: CPT

## 2021-01-28 PROCEDURE — 97116 GAIT TRAINING THERAPY: CPT

## 2021-01-28 PROCEDURE — 80048 BASIC METABOLIC PNL TOTAL CA: CPT

## 2021-01-28 PROCEDURE — 74011250636 HC RX REV CODE- 250/636: Performed by: FAMILY MEDICINE

## 2021-01-28 PROCEDURE — 96376 TX/PRO/DX INJ SAME DRUG ADON: CPT

## 2021-01-28 PROCEDURE — 84484 ASSAY OF TROPONIN QUANT: CPT

## 2021-01-28 PROCEDURE — 36415 COLL VENOUS BLD VENIPUNCTURE: CPT

## 2021-01-28 PROCEDURE — 85025 COMPLETE CBC W/AUTO DIFF WBC: CPT

## 2021-01-28 PROCEDURE — 94760 N-INVAS EAR/PLS OXIMETRY 1: CPT

## 2021-01-28 PROCEDURE — 97165 OT EVAL LOW COMPLEX 30 MIN: CPT | Performed by: OCCUPATIONAL THERAPIST

## 2021-01-28 PROCEDURE — 99223 1ST HOSP IP/OBS HIGH 75: CPT | Performed by: PSYCHIATRY & NEUROLOGY

## 2021-01-28 PROCEDURE — 99218 HC RM OBSERVATION: CPT

## 2021-01-28 PROCEDURE — 83735 ASSAY OF MAGNESIUM: CPT

## 2021-01-28 PROCEDURE — 97535 SELF CARE MNGMENT TRAINING: CPT | Performed by: OCCUPATIONAL THERAPIST

## 2021-01-28 PROCEDURE — 74011250637 HC RX REV CODE- 250/637: Performed by: FAMILY MEDICINE

## 2021-01-28 PROCEDURE — 95819 EEG AWAKE AND ASLEEP: CPT | Performed by: PSYCHIATRY & NEUROLOGY

## 2021-01-28 PROCEDURE — 96372 THER/PROPH/DIAG INJ SC/IM: CPT

## 2021-01-28 PROCEDURE — 65660000000 HC RM CCU STEPDOWN

## 2021-01-28 RX ORDER — LAMOTRIGINE 25 MG/1
75 TABLET ORAL DAILY
Status: DISCONTINUED | OUTPATIENT
Start: 2021-01-28 | End: 2021-01-30 | Stop reason: HOSPADM

## 2021-01-28 RX ORDER — POTASSIUM CHLORIDE 7.45 MG/ML
10 INJECTION INTRAVENOUS
Status: COMPLETED | OUTPATIENT
Start: 2021-01-28 | End: 2021-01-28

## 2021-01-28 RX ORDER — DILTIAZEM HYDROCHLORIDE 120 MG/1
120 CAPSULE, COATED, EXTENDED RELEASE ORAL
Status: DISCONTINUED | OUTPATIENT
Start: 2021-01-28 | End: 2021-01-30 | Stop reason: HOSPADM

## 2021-01-28 RX ORDER — LANOLIN ALCOHOL/MO/W.PET/CERES
400 CREAM (GRAM) TOPICAL ONCE
Status: COMPLETED | OUTPATIENT
Start: 2021-01-28 | End: 2021-01-28

## 2021-01-28 RX ORDER — ATORVASTATIN CALCIUM 40 MG/1
40 TABLET, FILM COATED ORAL DAILY
Status: DISCONTINUED | OUTPATIENT
Start: 2021-01-28 | End: 2021-01-28

## 2021-01-28 RX ORDER — MAGNESIUM SULFATE HEPTAHYDRATE 40 MG/ML
2 INJECTION, SOLUTION INTRAVENOUS ONCE
Status: COMPLETED | OUTPATIENT
Start: 2021-01-28 | End: 2021-01-28

## 2021-01-28 RX ORDER — BENZTROPINE MESYLATE 1 MG/1
0.5 TABLET ORAL
Status: DISCONTINUED | OUTPATIENT
Start: 2021-01-28 | End: 2021-01-30 | Stop reason: HOSPADM

## 2021-01-28 RX ORDER — ASPIRIN 325 MG
325 TABLET ORAL DAILY
Status: DISCONTINUED | OUTPATIENT
Start: 2021-01-28 | End: 2021-01-30 | Stop reason: HOSPADM

## 2021-01-28 RX ORDER — ATORVASTATIN CALCIUM 40 MG/1
40 TABLET, FILM COATED ORAL DAILY
Status: DISCONTINUED | OUTPATIENT
Start: 2021-01-28 | End: 2021-01-30 | Stop reason: HOSPADM

## 2021-01-28 RX ORDER — POTASSIUM CHLORIDE 750 MG/1
40 TABLET, FILM COATED, EXTENDED RELEASE ORAL ONCE
Status: COMPLETED | OUTPATIENT
Start: 2021-01-28 | End: 2021-01-28

## 2021-01-28 RX ADMIN — Medication 10 ML: at 22:00

## 2021-01-28 RX ADMIN — LEVETIRACETAM 500 MG: 100 INJECTION, SOLUTION INTRAVENOUS at 21:31

## 2021-01-28 RX ADMIN — LEVETIRACETAM 500 MG: 100 INJECTION, SOLUTION INTRAVENOUS at 09:36

## 2021-01-28 RX ADMIN — LAMOTRIGINE 75 MG: 25 TABLET ORAL at 09:37

## 2021-01-28 RX ADMIN — DILTIAZEM HYDROCHLORIDE 120 MG: 120 CAPSULE, COATED, EXTENDED RELEASE ORAL at 21:31

## 2021-01-28 RX ADMIN — MAGNESIUM SULFATE HEPTAHYDRATE 2 G: 40 INJECTION, SOLUTION INTRAVENOUS at 09:36

## 2021-01-28 RX ADMIN — BENZTROPINE MESYLATE 0.5 MG: 1 TABLET ORAL at 21:31

## 2021-01-28 RX ADMIN — ASPIRIN 325 MG: 325 TABLET, FILM COATED ORAL at 09:37

## 2021-01-28 RX ADMIN — POTASSIUM CHLORIDE 10 MEQ: 7.46 INJECTION, SOLUTION INTRAVENOUS at 10:33

## 2021-01-28 RX ADMIN — POTASSIUM CHLORIDE 10 MEQ: 7.46 INJECTION, SOLUTION INTRAVENOUS at 09:36

## 2021-01-28 RX ADMIN — Medication 10 ML: at 14:36

## 2021-01-28 RX ADMIN — ENOXAPARIN SODIUM 40 MG: 100 INJECTION SUBCUTANEOUS at 09:37

## 2021-01-28 RX ADMIN — BENZTROPINE MESYLATE 0.5 MG: 1 TABLET ORAL at 03:48

## 2021-01-28 RX ADMIN — Medication 10 ML: at 06:00

## 2021-01-28 RX ADMIN — POTASSIUM CHLORIDE 40 MEQ: 750 TABLET, FILM COATED, EXTENDED RELEASE ORAL at 05:54

## 2021-01-28 RX ADMIN — POTASSIUM CHLORIDE 10 MEQ: 7.46 INJECTION, SOLUTION INTRAVENOUS at 11:37

## 2021-01-28 RX ADMIN — Medication 400 MG: at 05:54

## 2021-01-28 RX ADMIN — ATORVASTATIN CALCIUM 40 MG: 40 TABLET, FILM COATED ORAL at 05:54

## 2021-01-28 RX ADMIN — DILTIAZEM HYDROCHLORIDE 120 MG: 120 CAPSULE, COATED, EXTENDED RELEASE ORAL at 03:48

## 2021-01-28 NOTE — PROGRESS NOTES
Problem: Falls - Risk of  Goal: *Absence of Falls  Description: Document Callaway Reason Fall Risk and appropriate interventions in the flowsheet. 1/28/2021 0830 by Daryn Simmons RN  Outcome: Progressing Towards Goal  Note: Fall Risk Interventions:  Mobility Interventions: Bed/chair exit alarm, Strengthening exercises (ROM-active/passive)    Mentation Interventions: Adequate sleep, hydration, pain control, More frequent rounding, Reorient patient    Medication Interventions: Patient to call before getting OOB, Teach patient to arise slowly    Elimination Interventions: Patient to call for help with toileting needs, Stay With Me (per policy), Toilet paper/wipes in reach    History of Falls Interventions: Door open when patient unattended, Consult care management for discharge planning, Bed/chair exit alarm      1/28/2021 0824 by Daryn Simmons RN  Outcome: Progressing Towards Goal  Note: Fall Risk Interventions:  Mobility Interventions: Bed/chair exit alarm, Strengthening exercises (ROM-active/passive)    Mentation Interventions: Adequate sleep, hydration, pain control, More frequent rounding, Reorient patient    Medication Interventions: Patient to call before getting OOB, Teach patient to arise slowly    Elimination Interventions: Patient to call for help with toileting needs, Stay With Me (per policy), Toilet paper/wipes in reach    History of Falls Interventions: Door open when patient unattended, Consult care management for discharge planning, Bed/chair exit alarm         Problem: Pressure Injury - Risk of  Goal: *Prevention of pressure injury  Description: Document Espinoza Scale and appropriate interventions in the flowsheet.   1/28/2021 0830 by Daryn Simmons, RN  Outcome: Progressing Towards Goal  Note: Pressure Injury Interventions:  Sensory Interventions: Assess changes in LOC, Float heels, Keep linens dry and wrinkle-free    Moisture Interventions: Check for incontinence Q2 hours and as needed, Maintain skin hydration (lotion/cream)    Activity Interventions: Increase time out of bed, Pressure redistribution bed/mattress(bed type)    Mobility Interventions: HOB 30 degrees or less, Pressure redistribution bed/mattress (bed type)    Nutrition Interventions: Document food/fluid/supplement intake                  1/28/2021 0824 by Satinder Hu RN  Outcome: Progressing Towards Goal  Note: Pressure Injury Interventions:  Sensory Interventions: Assess changes in LOC, Float heels, Keep linens dry and wrinkle-free    Moisture Interventions: Check for incontinence Q2 hours and as needed, Maintain skin hydration (lotion/cream)    Activity Interventions: Increase time out of bed, Pressure redistribution bed/mattress(bed type)    Mobility Interventions: HOB 30 degrees or less, Pressure redistribution bed/mattress (bed type)    Nutrition Interventions: Document food/fluid/supplement intake

## 2021-01-28 NOTE — PROGRESS NOTES
Nutrition Note    Pt screened for Low BMI. Current BMI is 17.5 wit weight of 108 lbs. On chart review, pt wt is stable over the last 7+ years, ranging from 100 lbs to 112 lbs on average. Currently close to baseline weight with no significant wt changes. Please consult if PO is poor. Pt would likely benefit from MVI / Goody bag.       Wt Readings from Last 30 Encounters:   01/28/21 49.1 kg (108 lb 4.8 oz)   01/08/21 47.2 kg (104 lb)   12/23/20 47 kg (103 lb 9.6 oz)   11/19/20 45.4 kg (100 lb)   11/04/20 45.4 kg (100 lb)   10/19/20 46.3 kg (102 lb)   09/30/20 49.1 kg (108 lb 3.2 oz)   12/18/19 48.5 kg (107 lb)   08/15/19 48.1 kg (106 lb)   08/02/19 45.8 kg (101 lb)   05/06/19 50.8 kg (112 lb)   04/23/19 50.8 kg (111 lb 14.4 oz)   04/12/16 52.2 kg (115 lb)   06/13/15 49.9 kg (110 lb)   06/11/15 47.6 kg (105 lb)   05/29/15 52.2 kg (115 lb)   10/13/14 52.2 kg (115 lb)   08/03/14 52.2 kg (115 lb)   06/18/14 52.2 kg (115 lb)   04/22/14 49.9 kg (110 lb)   11/21/13 49.9 kg (110 lb)   11/11/13 52.2 kg (115 lb)   02/25/13 49.9 kg (110 lb)          Electronically signed by Marcos Cody RD on 1/28/2021     Contact: [phone]

## 2021-01-28 NOTE — ROUTINE PROCESS
TRANSFER - OUT REPORT: 
 
Verbal report given to Hernán Wong RN(name) on Anali Toussaint  being transferred to NSTU(unit) for routine progression of care Report consisted of patients Situation, Background, Assessment and  
Recommendations(SBAR). Information from the following report(s) SBAR, ED Summary, [facility] and Recent Results was reviewed with the receiving nurse. Lines:  
Peripheral IV 01/27/21 Right Forearm (Active) Site Assessment Clean, dry, & intact 01/27/21 1742 Phlebitis Assessment 0 01/27/21 1742 Infiltration Assessment 0 01/27/21 1742 Dressing Status Clean, dry, & intact 01/27/21 1742 Opportunity for questions and clarification was provided. Patient transported with: 
 [employer]

## 2021-01-28 NOTE — PROGRESS NOTES
Hospitalist Progress Note    NAME: Arpita Romeo   :  1960   MRN:  [id number]       Assessment / Plan:  # Seizure Activity  -witnessed by EMS, and nurse in the ED s/p keppra load, and ativan in the ED  -CT head with no acute findings  -etiology unclear, though may be 2/2 ETOH withdrawal? UDS negative, ETOH <10  -stop  keppra 500mg BID: check MRI   -PRN valium per CIWAA protocol  Consulted neurologist  EEG ordered: normal EEG     #ETOH abuse: Reports drinking 2 beers most days. -ETOH <10  -can no quantify amt.  -last drink 3 days ago  -CIWAA  -IV MVI, replete electrolytes PRN     #Elevated Troponin, peaked. -no chest discomfort or anginal equivalent  -EKG with no acute findings       #bipolar d/o  -continue lamictal,and cogentin  -needs official med rec once more alert/and coherent     #HTN  -restart diltiazem, though will need official med-rec     DVT ppx: lovenox  MPOA: Gela Wooten (4meee)>states has nmber in the phone, but difficulty finding it. Code: Full     FUNCTIONAL STATUS PRIOR TO HOSPITALIZATION Ambulatory with Use of Assistive Devices. Endorses multiple falls within home      Subjective:     Chief Complaint / Reason for Physician Visit  \" Reports does not recall any seizure. \". Discussed with RN events overnight. Review of Systems:  Symptom Y/N Comments  Symptom Y/N Comments   Fever/Chills    Chest Pain     Poor Appetite    Edema     Cough    Abdominal Pain     Sputum    Joint Pain     SOB/VENTURA    Pruritis/Rash     Nausea/vomit    Tolerating PT/OT     Diarrhea    Tolerating Diet     Constipation    Other       Could NOT obtain due to:      Objective:     VITALS:   Last 24hrs VS reviewed since prior progress note.  Most recent are:  Patient Vitals for the past 24 hrs:   Temp Pulse Resp BP SpO2   21 1434 97.6 °F (36.4 °C) 93 19 115/76 97 %   21 1033 98.3 °F (36.8 °C) 69 15 109/73 98 %   21 0936  81  (!) 110/93    21 0555 98.7 °F (37.1 °C) 84 18 115/84 99 % 01/28/21 0200 97.8 °F (36.6 °C) 82 14 127/82 94 %   01/27/21 2240 98.7 °F (37.1 °C) 95 22 (!) 136/97 98 %   01/27/21 2130  73 18 (!) 158/89 96 %   01/27/21 2030  64 14 (!) 168/96 97 %   01/27/21 1935  73 16 (!) 156/94 96 %   01/27/21 1930  70 15 (!) 163/90 97 %   01/27/21 1905  80 16 (!) 168/97 100 %   01/27/21 1818  70 15 (!) 181/93 94 %   01/27/21 1800  79 14 (!) 163/92 96 %   01/27/21 1741 98.4 °F (36.9 °C)       01/27/21 1736  86 15 (!) 164/90 99 %   01/27/21 1726  97 21 (!) 164/90 96 %     No intake or output data in the 24 hours ending 01/28/21 1516     I had a face to face encounter and independently examined this patient on 1/28/2021, as outlined below:  PHYSICAL EXAM:  General: WD, WN. Alert, cooperative, no acute distress    EENT:  EOMI. Anicteric sclerae. MMM  Resp:  CTA bilaterally, no wheezing or rales. No accessory muscle use  CV:  Regular  rhythm,  No edema  GI:  Soft, Non distended, Non tender. +Bowel sounds  Neurologic:  Alert and oriented X 3, normal speech,   Psych:   Not anxious nor agitated  Skin:  No rashes. No jaundice    Reviewed most current lab test results and cultures  YES  Reviewed most current radiology test results   YES  Review and summation of old records today    NO  Reviewed patient's current orders and MAR    YES  PMH/SH reviewed - no change compared to H&P  ________________________________________________________________________  Care Plan discussed with:    Comments   Patient     Family      RN     Care Manager     Consultant                        Multidiciplinary team rounds were held today with , nursing, pharmacist and clinical coordinator. Patient's plan of care was discussed; medications were reviewed and discharge planning was addressed.      ________________________________________________________________________  Total NON critical care TIME:  35   Minutes    Total CRITICAL CARE TIME Spent:   Minutes non procedure based      Comments   >50% of visit spent in counseling and coordination of care     ________________________________________________________________________  Jewel Irizarry MD     Procedures: see electronic medical records for all procedures/Xrays and details which were not copied into this note but were reviewed prior to creation of Plan. LABS:  I reviewed today's most current labs and imaging studies.   Pertinent labs include:  Recent Labs     01/28/21 0332 01/27/21  1745   WBC 4.3 5.0   HGB 12.2 14.4   HCT 35.1 41.4   * 155     Recent Labs     01/28/21 0332 01/27/21  1817 01/27/21  1745     --  137   K 3.2*  --  3.3*     --  101   CO2 24  --  24   GLU 97  --  172*   BUN 5*  --  7   CREA 0.46*  --  0.71   CA 8.8  --  9.9   MG 1.7 1.4*  --    ALB  --   --  4.2   TBILI  --   --  0.8   ALT  --   --  31       Signed: Jewel Irizarry MD

## 2021-01-28 NOTE — ED NOTES
Entered room to find pt with stiffened extremities; not responding to verbal stimuli consistent with seizure activity.  [facility] informed.

## 2021-01-28 NOTE — PROGRESS NOTES
Bedside and Verbal shift change report given to Britany Pugh rn (oncoming nurse) by Kanchan Lindquist, student nurse and amy hong (offgoing nurse). Report included the following information SBAR, Kardex, Cardiac Rhythm nsr and Dual Neuro Assessment.

## 2021-01-28 NOTE — PROGRESS NOTES
Problem: Self Care Deficits Care Plan (Adult)  Goal: *Acute Goals and Plan of Care (Insert Text)  Description:   FUNCTIONAL STATUS PRIOR TO ADMISSION: Patient was modified independent using a rollator for functional mobility. Does not drive. Lives in supportive housing. Last fall was 1 week ago. HOME SUPPORT: The patient lived alone with only local support is her . Occupational Therapy Goals  Initiated 1/28/2021  1. Patient will perform lower body dressing with supervision/set-up using adaptive equipment as needed within 7 day(s). 2.  Patient will perform grooming standing at sink with supervision/set-up within 7 day(s). 3.  Patient will perform toilet transfers with supervision/set-up within 7 day(s). 4.  Patient will perform all aspects of toileting with supervision/set-up within 7 day(s). 5.  Patient will participate in upper extremity therapeutic exercise/activities with modified independence for 10 minutes within 7 day(s). 6.  Patient will utilize energy conservation techniques during functional activities with verbal and visual cues within 7 day(s). Outcome: Progressing Towards Goal     OCCUPATIONAL THERAPY EVALUATION  Patient: Jonah Martínez (63 y.o. female)  Date: 1/28/2021  Primary Diagnosis: Seizure ([facility] [address] [R56.9]  Alcohol withdrawal ([facility] [address] [F10.239]        Precautions: Fall    ASSESSMENT  Based on the objective data described below, the patient presents with poor safety awareness, decreased insight into her impairments and high fall risk, decreased strength, endurance, mobility, balance and safety. Pt with at least 2 major LOB during functional mobility requiring physical assist to maintain upright balance. Pt lives alone with only a  as support. Pt is a high fall risk and would benefit from 24/7 supervision for safety.     Current Level of Function Impacting Discharge (ADLs/self-care): mod A LE ADLs and toileting, min A functional mobility using RW to amb    Functional Outcome Measure: The patient scored Total: 55/100 on the Barthel Index outcome measure. Other factors to consider for discharge: see above     Patient will benefit from skilled therapy intervention to address the above noted impairments. PLAN :  Recommendations and Planned Interventions: self care training, functional mobility training, therapeutic exercise, balance training, therapeutic activities, cognitive retraining, endurance activities, neuromuscular re-education, patient education, home safety training, and family training/education    Frequency/Duration: Patient will be followed by occupational therapy 5 times a week to address goals. Recommendation for discharge: (in order for the patient to meet his/her long term goals)  Therapy up to 5 days/week in SNF setting or an intensive home health therapy program    This discharge recommendation:  Has not yet been discussed the attending provider and/or case management    IF patient discharges home will need the following DME: TBD       SUBJECTIVE:   Patient stated I am fine and home and am ready to go.     OBJECTIVE DATA SUMMARY:   HISTORY:   Past Medical History:   Diagnosis Date    Akathisia     Bipolar 1 disorder ([facility] [address]     DVT (deep venous thrombosis) ([facility] [address]     ETOH abuse     Hypertension     Shingles      Past Surgical History:   Procedure Laterality Date    HX ORTHOPAEDIC      Broken hip R/Neck surgery. Expanded or extensive additional review of patient history:     Home Situation  Home Environment: Apartment([facility])  # Steps to Enter:  0(elevator to her 4th floor apt)  One/Two Story Residence: One story  Living Alone: Yes  Support Systems: Friends \ neighbors  Patient Expects to be Discharged to[de-identified] Apartment  Current DME Used/Available at Home: Walker, rollator, Caryl Rands, straight, Adaptive dressing aides(reacher and long shoe horn)  Tub or Shower Type: Shower    Hand dominance: Right    EXAMINATION OF PERFORMANCE DEFICITS:  Cognitive/Behavioral Status:  Neurologic State: Alert;Confused  Orientation Level: Oriented to person;Oriented to place; Disoriented to situation  Cognition: Decreased attention/concentration; Follows commands  Perception: Appears intact  Perseveration: No perseveration noted  Safety/Judgement: Awareness of environment;Decreased awareness of need for assistance;Decreased awareness of need for safety;Decreased insight into deficits; Fall prevention      Hearing: Auditory  Auditory Impairment: None    Vision/Perceptual:    Acuity: Able to read clock/calendar on wall without difficulty; Able to read employee name badge without difficulty    Corrective Lenses: Glasses    Range of Motion:  AROM: Generally decreased, functional  PROM: Generally decreased, functional                      Strength:  Strength: Generally decreased, functional                Coordination:  Coordination: Generally decreased, functional  Fine Motor Skills-Upper: Left Impaired;Right Impaired    Gross Motor Skills-Upper: Left Intact; Right Intact    Tone & Sensation:  Tone: Normal                         Balance:  Sitting: Intact  Standing: Impaired; With support(RW)  Standing - Static: Fair;Constant support  Standing - Dynamic : Fair;Constant support    Functional Mobility and Transfers for ADLs:  Bed Mobility:  Rolling: Stand-by assistance  Supine to Sit: Contact guard assistance  Scooting: Contact guard assistance    Transfers:  Sit to Stand: Minimum assistance; Additional time;Assist x1  Stand to Sit: Minimum assistance; Additional time;Assist x1  Bathroom Mobility: Minimum assistance  Toilet Transfer : Minimum assistance  Assistive Device : Gait Belt;Walker, rolling    ADL Assessment and Intervention:  Feeding: Modified independent    Oral Facial Hygiene/Grooming: Minimum assistance; Additional time(in standing)    Bathing: Additional time;Assist x1; Moderate assistance(seated- A to reach RLE, imp balance)    Upper Body Dressing: Setup;Supervision    Lower Body Dressing: Moderate assistance(seated- A to reach RLE, imp balance)    Toileting: Moderate assistance; Additional time;Assist x1(imp standing balance)     Cognitive Retraining  Safety/Judgement: Awareness of environment;Decreased awareness of need for assistance;Decreased awareness of need for safety;Decreased insight into deficits; Fall prevention      Functional Measure:  Barthel Index:    Bathin  Bladder: 5  Bowels: 10  Groomin  Dressin  Feeding: 10  Mobility: 5  Stairs: 0  Toilet Use: 5  Transfer (Bed to Chair and Back): 10  Total: 55/100        The Barthel ADL Index: Guidelines  1. The index should be used as a record of what a patient does, not as a record of what a patient could do. 2. The main aim is to establish degree of independence from any help, physical or verbal, however minor and for whatever reason. 3. The need for supervision renders the patient not independent. 4. A patient's performance should be established using the best available evidence. Asking the patient, friends/relatives and nurses are the usual sources, but direct observation and common sense are also important. However direct testing is not needed. 5. Usually the patient's performance over the preceding 24-48 hours is important, but occasionally longer periods will be relevant. 6. Middle categories imply that the patient supplies over 50 per cent of the effort. 7. Use of aids to be independent is allowed. Arianna Chaney., Barthel, D.W. (4095). Functional evaluation: the Barthel Index. [facility] (14)2. ZAMZAM Guzmán, Giovanna Kumar., Threasa Hammans., [place], [address] [place] [address] (). Measuring the change indisability after inpatient rehabilitation; comparison of the responsiveness of the Barthel Index and Functional [place] Measure. Journal of Neurology, Neurosurgery, and Psychiatry, 66(4), 911-951.   Snadeep Mesa NBryannaJ.A, TEODORA Collier, Nico Dey MLAKSHMI. (2004.) Assessment of post-stroke quality of life in cost-effectiveness studies: The usefulness of the Barthel Index and the EuroQoL-5D. Quality of Life Research, 15, 862-00         Occupational Therapy Evaluation Charge Determination   History Examination Decision-Making   LOW Complexity : Brief history review  HIGH Complexity : 5 or more performance deficits relating to physical, cognitive , or psychosocial skils that result in activity limitations and / or participation restrictions MEDIUM Complexity : Patient may present with comorbidities that affect occupational performnce. Miniml to moderate modification of tasks or assistance (eg, physical or verbal ) with assesment(s) is necessary to enable patient to complete evaluation       Based on the above components, the patient evaluation is determined to be of the following complexity level: LOW   Pain Rating:  No c/o pain    Activity Tolerance:   Fair    After treatment patient left in no apparent distress:    Sitting in chair, Call bell within reach, and Bed / chair alarm activated    COMMUNICATION/EDUCATION:   The patients plan of care was discussed with: Physical therapist and Registered nurse. Home safety education was provided and the patient/caregiver indicated understanding., Patient/family have participated as able in goal setting and plan of care. , and Patient/family agree to work toward stated goals and plan of care. This patients plan of care is appropriate for delegation to AMY.     Thank you for this referral.  Allene Apley, OT  Time Calculation: 22 mins

## 2021-01-28 NOTE — PROGRESS NOTES
Problem: Falls - Risk of  Goal: *Absence of Falls  Description: Document Shae Blood Fall Risk and appropriate interventions in the flowsheet. Outcome: Progressing Towards Goal  Note: Fall Risk Interventions:  Mobility Interventions: Bed/chair exit alarm, Strengthening exercises (ROM-active/passive)    Mentation Interventions: Adequate sleep, hydration, pain control, More frequent rounding, Reorient patient    Medication Interventions: Patient to call before getting OOB, Teach patient to arise slowly    Elimination Interventions: Patient to call for help with toileting needs, Stay With Me (per policy), Toilet paper/wipes in reach    History of Falls Interventions: Door open when patient unattended, Consult care management for discharge planning, Bed/chair exit alarm         Problem: Pressure Injury - Risk of  Goal: *Prevention of pressure injury  Description: Document Espinoza Scale and appropriate interventions in the flowsheet.   Outcome: Progressing Towards Goal  Note: Pressure Injury Interventions:  Sensory Interventions: Assess changes in LOC, Float heels, Keep linens dry and wrinkle-free    Moisture Interventions: Check for incontinence Q2 hours and as needed, Maintain skin hydration (lotion/cream)    Activity Interventions: Increase time out of bed, Pressure redistribution bed/mattress(bed type)    Mobility Interventions: HOB 30 degrees or less, Pressure redistribution bed/mattress (bed type)    Nutrition Interventions: Document food/fluid/supplement intake

## 2021-01-28 NOTE — PROCEDURES
PROCEDURE: ROUTINE INPATIENT EEG  NAME:   Candy Sauer NUMBER : [de-identified]  MRN:   [id number]  DATE OF SERVICE: 1/28/2021     HISTORY/INDICATION: Pt is a 60yo with h/o ETOH abuse admitted for several seizures over the last several weeks and had a spell while on a zoom with a medical provider and EMS was called. EEG to assess for underlying epilepsy.       MEDICATIONS:   Current Facility-Administered Medications   Medication Dose Route Frequency Provider Last Rate Last Admin    levETIRAcetam (KEPPRA) 500 mg in 0.9% sodium chloride 100 mL IVPB  500 mg IntraVENous Q12H Faisal Uribe MD   500 mg at 01/28/21 0936    lamoTRIgine (LaMICtal) tablet 75 mg  75 mg Oral DAILY Faisal Uribe MD   75 mg at 01/28/21 7073    benztropine (COGENTIN) tablet 0.5 mg  0.5 mg Oral QHS Faisal Uribe MD   0.5 mg at 01/28/21 0348    dilTIAZem ER (CARDIZEM CD) capsule 120 mg  120 mg Oral QHS Faisal Uribe MD   120 mg at 01/28/21 0348    aspirin tablet 325 mg  325 mg Oral DAILY Faisal Uribe MD   325 mg at 01/28/21 0843    atorvastatin (LIPITOR) tablet 40 mg  40 mg Oral DAILY Faisal Uribe MD   40 mg at 01/28/21 0554    sodium chloride (NS) flush 5-40 mL  5-40 mL IntraVENous Q8H Faisal Uribe MD   10 mL at 01/28/21 1436    sodium chloride (NS) flush 5-40 mL  5-40 mL IntraVENous PRN Faisal Uribe MD        acetaminophen (TYLENOL) tablet 650 mg  650 mg Oral Q6H PRN MD Dandy Devries acetaminophen (TYLENOL) suppository 650 mg  650 mg Rectal Q6H PRN Faisal Uribe MD        enoxaparin (LOVENOX) injection 40 mg  40 mg SubCUTAneous DAILY Faisal Uribe MD   40 mg at 01/28/21 6799    diazePAM (VALIUM) injection 10 mg  10 mg IntraVENous Q1H PRN Faisal Uribe MD        diazePAM (VALIUM) injection 20 mg  20 mg IntraVENous Q1H PRN Faisal Uribe MD           CONDITIONS OF RECORDING: This is a routine 21-channel EEG recording performed in accordance with the international 10-20 system with one channel devoted to limited EKG. This study was done during states of wakefulness and sleep. Photic stimulation was performed as an activating procedure. DESCRIPTION:   Upon maximal arousal the posterior dominant rhythm has a frequency of 11Hz with an amplitude of 20uV. This activity is symmetric over the bilateral posterior derivations and attenuates with eye opening. Photic stimulation did not significantly alter the tracing. Normal sleep architecture is seen with stage II sleep recognized by the presence of symmetric vertex waves and sleep spindles. There are no focal abnormalities, epileptiform discharges, or electrographic seizures seen. INTERPRETATION: Normal awake and asleep EEG    CLINICAL CORRELATION: A normal EEG does not definitively exclude a diagnosis of epilepsy if clinical suspicion is high consider sleep deprived EEG.     Serafin Keys MD

## 2021-01-28 NOTE — H&P
[facility] [facility] Adult  Hospitalist Group  History and Physical    Primary Care Provider: Apoorva Cardona NP  Date of Service:  1/27/2021    Subjective:     Loly Chung is a 63 y.o. female with hx bipolar d/o, ETOH abuse, and HTN presents with c/o seizures. Patient was unable to provide me with history due to not remembering the events prior to arrival, but per record review, she told ED provider that she has had several seizures in the last several weeks, and has pending appt. with neurology. She reportedly had another such episode while on a zoom appt. With a medical provider, and EMS was contacted and transferred pt to the hospital as a result. Review of Systems:    Review of systems not obtained due to patient factors. Past Medical History:   Diagnosis Date    Akathisia     Bipolar 1 disorder ([address]     [facility]     ETOH abuse     Hypertension     Shingles       Past Surgical History:   Procedure Laterality Date    HX ORTHOPAEDIC      Broken hip R/Neck surgery. Prior to Admission medications    Medication Sig Start Date End Date Taking? Authorizing Provider   folic acid (FOLVITE) 1 mg tablet Take 1 Tab by mouth daily. 1/27/21   Skiff, Janet Harter, NP   cholecalciferol (VITAMIN D3) (2,000 UNITS /50 MCG) cap capsule Take 2,000 Units by mouth daily. 12/31/20   Skiff, Janet Harter, NP   dilTIAZem ER (Cardizem CD) 120 mg capsule Take 1 Cap by mouth nightly. 12/23/20   Ina Garcia MD   midodrine (PROAMATINE) 2.5 mg tablet Take 1 Tab by mouth daily. 12/23/20   Ina Garcia MD   gabapentin (Neurontin) 100 mg capsule Take 2 Caps by mouth three (3) times daily. 11/24/20   Skiff, Janet Harter, NP   magnesium oxide (MAG-OX) 400 mg tablet Take 1 Tab by mouth daily.  11/18/20   DiefenderferTapasquale Pierre NP   miscellaneous medical supply (Blood Pressure Cuff) misc Use daily to check BP 9/30/20   Skiff, Janet Harter, NP   thiamine HCL (VITAMIN B-1) 100 mg tablet Take 1 Tab by mouth daily. 12/18/19   Patience Severino, NP   citalopram (CELEXA) 10 mg tablet Take  by mouth daily. Provider, Historical   benztropine (COGENTIN) 0.5 mg tablet Take 0.5 mg by mouth nightly. Provider, Historical   lamoTRIgine (LAMICTAL) 25 mg tablet Take 75 mg by mouth daily. Provider, Historical     Allergies   Allergen Reactions    Chocolate [Cocoa] Hives    Peanut Other (comments)      Family History   Problem Relation Age of Onset    Diabetes Mother         SOCIAL HISTORY:  Patient resides at Home. Patient ambulates with  Rollator and cane  Smoking history: none  Alcohol history: can not quantify how much alcohol she uses. Last drink reported as three days prior to arrival.    Objective:       Physical Exam:   Visit Vitals  BP (!) 136/97 (BP 1 Location: Left arm, BP Patient Position: At rest)   Pulse 95   Temp 98.7 °F (37.1 °C)   Resp 22   Ht 5' 6\" (1.676 m)   Wt 52.8 kg (116 lb 6.5 oz)   SpO2 98%   BMI 18.79 kg/m²     General:  Lethargic, cooperative, no distress   Head:  Normocephalic, without obvious abnormality, atraumatic. Eyes:  Conjunctivae/corneas clear. PERRL, EOMs intact. Throat: Lips, mucosa, and tongue normal.    Neck: Supple, symmetrical, trachea midline   Back:   Symmetric, no curvature. ROM normal.    Lungs:   Clear to auscultation bilaterally. Chest wall:  No tenderness or deformity. Heart:  Regular rate and rhythm, S1, S2 normal, no murmur, click, rub or gallop. Abdomen:   Soft, non-tender. Bowel sounds normal. No masses,  No organomegaly. Extremities: Extremities normal, atraumatic, no cyanosis or edema. Pulses: 2+ radial pulses   Skin: Skin color, texture, turgor normal. No rashes or lesions. Neurologic:  Moves all extremities spontaneously, and follows commands, lethargic     ECG: Normal sinus rhythm    Data Review: All diagnostic labs and studies have been reviewed.     Chest x-ray: no acute cardiopulmonary process    Assessment:     Active Problems:    * No active hospital problems. *      Plan:     # Seizure Activity  -witnessed by EMS, and nurse in the ED s/p keppra load, and ativan in the ED  -CT head with no acute findings  -etiology unclear, though may be 2/2 ETOH withdrawal? UDS negative, ETOH <10  -continue keppra 500mg BID  -PRN valium per CIWAA protocol    #ETOH abuse  -ETOH <10  -can no quantify amt.  -last drink 3 days ago  -CIWAA  -IV MVI, replete electrolytes PRN    #Elevated Troponin  -no chest discomfort or anginal equivalent  -EKG with no acute findings  -continue to trend. #bipolar d/o  -continue lamictal,and cogentin  -needs official med rec once more alert/and coherent    #HTN  -restart diltiazem, though will need official med-rec    DVT ppx: lovenox  MPOA: Krishna Freemnaal (sone)>states has nmber in the phone, but difficulty finding it. Code: Full    FUNCTIONAL STATUS PRIOR TO HOSPITALIZATION Ambulatory with Use of Assistive Devices.  Endorses multiple falls within home      Signed By: Rabia Shetty MD     January 27, 2021

## 2021-01-28 NOTE — PROGRESS NOTES
Problem: Mobility Impaired (Adult and Pediatric)  Goal: *Acute Goals and Plan of Care (Insert Text)  Description: FUNCTIONAL STATUS PRIOR TO ADMISSION: Patient was modified independent using a rollator for functional mobility. Does not drive. Lives in supportive housing. Last fall was 1 week ago. HOME SUPPORT: The patient lived alone with only local support is her . Physical Therapy Goals  Initiated 1/28/2021  1. Patient will move from supine to sit and sit to supine , scoot up and down, and roll side to side in bed with supervision/set-up within 7 day(s). 2.  Patient will transfer from bed to chair and chair to bed with supervision/set-up using the least restrictive device within 7 day(s). 3.  Patient will perform sit to stand with supervision/set-up within 7 day(s). 4.  Patient will ambulate with supervision/set-up for 150 feet with the least restrictive device within 7 day(s). Outcome: Not Met  PHYSICAL THERAPY EVALUATION  Patient: Melissa Gallardo (12 y.o. female)  Date: 1/28/2021  Primary Diagnosis: Seizure ([facility] [address] [R56.9]  Alcohol withdrawal ([facility] [address] [F10.239]        Precautions:  Fall    ASSESSMENT  Based on the objective data described below, the patient presents with impaired balance, unsteady gait, decreased activity tolerance, decreased safety awareness, and poor insight into deficits. Mobilized in the room with Bruna using RW with several LOB and poor walker management. She is a high fall risk and lives alone. Recommending SNF upon discharge      Current Level of Function Impacting Discharge (mobility/balance): Bruna     Functional Outcome Measure: The patient scored 50/100 on the Barthel outcome measure which is indicative of 50% impairment and dependence on others for basic mobility and self care tasks.         Other factors to consider for discharge: fall risk, hx falls, lives alone, poor safety awareness, poor insight into deficits      Patient will benefit from skilled therapy intervention to address the above noted impairments. PLAN :  Recommendations and Planned Interventions: bed mobility training, transfer training, gait training, therapeutic exercises, neuromuscular re-education, patient and family training/education, and therapeutic activities      Frequency/Duration: Patient will be followed by physical therapy:  4 times a week to address goals. Recommendation for discharge: (in order for the patient to meet his/her long term goals)  Therapy up to 5 days/week in SNF setting    This discharge recommendation:  Has not yet been discussed the attending provider and/or case management    IF patient discharges home will need the following DME: patient owns DME required for discharge         SUBJECTIVE:   Patient stated I have a copper shower head.  when asked if she lives alone    OBJECTIVE DATA SUMMARY:   HISTORY:    Past Medical History:   Diagnosis Date    Akathisia     Bipolar 1 disorder ([facility] [address]     DVT (deep venous thrombosis) ([facility] [address]     ETOH abuse     Hypertension     Shingles      Past Surgical History:   Procedure Laterality Date    HX ORTHOPAEDIC      Broken hip R/Neck surgery. Home Situation  Home Environment: Apartment([employer] [address])  # Steps to Enter:  0(elevator to her 4th floor apt)  One/Two Story Residence: One story  Living Alone: Yes  Support Systems: Friends \ neighbors  Patient Expects to be Discharged to[de-identified] Apartment  Current DME Used/Available at Home: Soraida Ply, rollator, Cane, straight, Adaptive dressing aides(reacher and long shoe horn)  Tub or Shower Type: Shower    EXAMINATION/PRESENTATION/DECISION MAKING:   Critical Behavior:  Neurologic State: Alert, Confused  Orientation Level: Oriented to person, Oriented to place, Disoriented to situation  Cognition: Decreased attention/concentration, Follows commands  Safety/Judgement: Awareness of environment, Decreased awareness of need for assistance, Decreased awareness of need for safety, Decreased insight into deficits, Fall prevention  Hearing: Auditory  Auditory Impairment: None    Range Of Motion:  AROM: Generally decreased, functional           PROM: Generally decreased, functional           Strength:    Strength: Generally decreased, functional                    Tone & Sensation:   Tone: Normal                              Coordination:  Coordination: Generally decreased, functional  Vision:   Acuity: Able to read clock/calendar on wall without difficulty; Able to read employee name badge without difficulty  Corrective Lenses: Glasses  Functional Mobility:  Bed Mobility:  Rolling: Stand-by assistance  Supine to Sit: Contact guard assistance     Scooting: Contact guard assistance  Transfers:  Sit to Stand: Minimum assistance; Additional time;Assist x1  Stand to Sit: Minimum assistance; Additional time;Assist x1                       Balance:   Sitting: Intact  Standing: Impaired; With support(RW)  Standing - Static: Fair;Constant support  Standing - Dynamic : Fair;Constant support    Ambulation/Gait Training:  Distance (ft): 30 Feet (ft)  Assistive Device: Gait belt;Walker, rolling  Ambulation - Level of Assistance: Minimal assistance  Gait Abnormalities: Decreased step clearance;Shuffling gait;Trunk sway increased  Base of Support: Narrowed  Stance: Left decreased;Right decreased  Speed/Zenia: Slow;Shuffled  Step Length: Right shortened;Left shortened  Swing Pattern: Left asymmetrical;Right asymmetrical    Functional Measure:  Barthel Index:    Bathin  Bladder: 5  Bowels: 10  Groomin  Dressin  Feeding: 10  Mobility: 0  Stairs: 0  Toilet Use: 5  Transfer (Bed to Chair and Back): 10  Total: 50/100       The Barthel ADL Index: Guidelines  1. The index should be used as a record of what a patient does, not as a record of what a patient could do. 2. The main aim is to establish degree of independence from any help, physical or verbal, however minor and for whatever reason.   3. The need for supervision renders the patient not independent. 4. A patient's performance should be established using the best available evidence. Asking the patient, friends/relatives and nurses are the usual sources, but direct observation and common sense are also important. However direct testing is not needed. 5. Usually the patient's performance over the preceding 24-48 hours is important, but occasionally longer periods will be relevant. 6. Middle categories imply that the patient supplies over 50 per cent of the effort. 7. Use of aids to be independent is allowed. Christy Orantes., BarthelPATY. (6086). Functional evaluation: the Barthel Index. [facility] (14)2. ZAMZAM Suarez, Allen Diego., Shanika Pitts, Garth, [address] [place] [address] (1999). Measuring the change indisability after inpatient rehabilitation; comparison of the responsiveness of the Barthel Index and Functional [place] Measure. Journal of Neurology, Neurosurgery, and Psychiatry, 66(4), 444-190. Benson Rogers, N.J.A, TEODORA Collier, & Jesús Mae MLAKSHMI. (2004.) Assessment of post-stroke quality of life in cost-effectiveness studies: The usefulness of the Barthel Index and the EuroQoL-5D.  Quality of Life Research, 15, 905-11        Physical Therapy Evaluation Charge Determination   History Examination Presentation Decision-Making   HIGH Complexity :3+ comorbidities / personal factors will impact the outcome/ POC  HIGH Complexity : 4+ Standardized tests and measures addressing body structure, function, activity limitation and / or participation in recreation  MEDIUM Complexity : Evolving with changing characteristics  Other outcome measures Barthel  MEDIUM      Based on the above components, the patient evaluation is determined to be of the following complexity level: MEDIUM    Activity Tolerance:   Fair    After treatment patient left in no apparent distress:   Sitting in chair, Call bell within reach, and Bed / chair alarm activated    COMMUNICATION/EDUCATION:   The patients plan of care was discussed with: Occupational therapist and Registered nurse. Fall prevention education was provided and the patient/caregiver indicated understanding., Patient/family have participated as able in goal setting and plan of care. , and Patient/family agree to work toward stated goals and plan of care.     Thank you for this referral.  Rajinder Castro, PT, DPT   Time Calculation: 21 mins

## 2021-01-28 NOTE — PROGRESS NOTES
TRANSITION OF CARE  RUR--12%  Disposition--TBD. PT and OT evaluations are pending. Transport--[employer] taxi versus [employer]    [facility]  [address] [facility] [phone]  [facility] [phone]. Care Management Interventions  PCP Verified by CM: Yes  Transition of Care Consult (CM Consult): Other(None)  Physical Therapy Consult: Yes  Occupational Therapy Consult: Yes  Speech Therapy Consult: No  Current Support Network: Lives Alone  Confirm Follow Up Transport: Family  The Plan for Transition of Care is Related to the Following Treatment Goals : TBD  Discharge Location  Discharge Placement: Other:(TBD)    Reason for Admission:   Alcohol withdrawal             Seizures             History Bipolar disorder, HTN,               Per patient, history of hip fracture & surgery about 1 1/2 years ago. RUR Score:          12%           Plan for utilizing home health:    TBD    PCP: First and Last name:  Devora Sharma NP   Name of Practice:    Are you a current patient: Yes    Approximate date of last visit: one to 2 months ago   Can you participate in a virtual visit with your PCP:                     Current Advanced Directive/Advance Care Plan: Not on file                         Transition of Care Plan:                    CM met with patient. Patient was alert, animated, cognitively disorganized with variable yet marked memory impairment. Patient lives alone in an apartment in the [address] [place] [address]. CM reports limited but daily alcohol intake. Patient has a sister in [place], brother in [place], brother in [place], and a brother in [facility]. Per [facility]  Duncan, patient is estranged from all family members. Patient confirmed PCP, health insurance, and prescription coverage. Patient receives mental health services with [facility]. Patient receives housing services with [facility].
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
## 2021-01-29 ENCOUNTER — APPOINTMENT (OUTPATIENT)
Dept: MRI IMAGING | Age: 61
End: 2021-01-29
Attending: PSYCHIATRY & NEUROLOGY
Payer: MEDICAID

## 2021-01-29 LAB
ANION GAP SERPL CALC-SCNC: 4 MMOL/L (ref 5–15)
BASOPHILS # BLD: 0 K/UL (ref 0–0.1)
BASOPHILS NFR BLD: 1 % (ref 0–1)
BUN SERPL-MCNC: 7 MG/DL (ref 6–20)
BUN/CREAT SERPL: 14 (ref 12–20)
CALCIUM SERPL-MCNC: 9 MG/DL (ref 8.5–10.1)
CHLORIDE SERPL-SCNC: 109 MMOL/L (ref 97–108)
CO2 SERPL-SCNC: 25 MMOL/L (ref 21–32)
CREAT SERPL-MCNC: 0.49 MG/DL (ref 0.55–1.02)
DIFFERENTIAL METHOD BLD: ABNORMAL
EOSINOPHIL # BLD: 0.1 K/UL (ref 0–0.4)
EOSINOPHIL NFR BLD: 2 % (ref 0–7)
ERYTHROCYTE [DISTWIDTH] IN BLOOD BY AUTOMATED COUNT: 13.2 % (ref 11.5–14.5)
GLUCOSE SERPL-MCNC: 96 MG/DL (ref 65–100)
HCT VFR BLD AUTO: 35.1 % (ref 35–47)
HGB BLD-MCNC: 12.1 G/DL (ref 11.5–16)
IMM GRANULOCYTES # BLD AUTO: 0 K/UL (ref 0–0.04)
IMM GRANULOCYTES NFR BLD AUTO: 0 % (ref 0–0.5)
LYMPHOCYTES # BLD: 1.3 K/UL (ref 0.8–3.5)
LYMPHOCYTES NFR BLD: 45 % (ref 12–49)
MAGNESIUM SERPL-MCNC: 1.6 MG/DL (ref 1.6–2.4)
MCH RBC QN AUTO: 32.2 PG (ref 26–34)
MCHC RBC AUTO-ENTMCNC: 34.5 G/DL (ref 30–36.5)
MCV RBC AUTO: 93.4 FL (ref 80–99)
MONOCYTES # BLD: 0.3 K/UL (ref 0–1)
MONOCYTES NFR BLD: 12 % (ref 5–13)
NEUTS SEG # BLD: 1.1 K/UL (ref 1.8–8)
NEUTS SEG NFR BLD: 40 % (ref 32–75)
NRBC # BLD: 0 K/UL (ref 0–0.01)
NRBC BLD-RTO: 0 PER 100 WBC
PLATELET # BLD AUTO: 131 K/UL (ref 150–400)
PMV BLD AUTO: 9.2 FL (ref 8.9–12.9)
POTASSIUM SERPL-SCNC: 3.9 MMOL/L (ref 3.5–5.1)
RBC # BLD AUTO: 3.76 M/UL (ref 3.8–5.2)
SODIUM SERPL-SCNC: 138 MMOL/L (ref 136–145)
WBC # BLD AUTO: 2.9 K/UL (ref 3.6–11)

## 2021-01-29 PROCEDURE — 36415 COLL VENOUS BLD VENIPUNCTURE: CPT

## 2021-01-29 PROCEDURE — 74011250637 HC RX REV CODE- 250/637: Performed by: FAMILY MEDICINE

## 2021-01-29 PROCEDURE — 85025 COMPLETE CBC W/AUTO DIFF WBC: CPT

## 2021-01-29 PROCEDURE — 70553 MRI BRAIN STEM W/O & W/DYE: CPT

## 2021-01-29 PROCEDURE — 97116 GAIT TRAINING THERAPY: CPT

## 2021-01-29 PROCEDURE — 99218 HC RM OBSERVATION: CPT

## 2021-01-29 PROCEDURE — 97535 SELF CARE MNGMENT TRAINING: CPT | Performed by: OCCUPATIONAL THERAPIST

## 2021-01-29 PROCEDURE — 99232 SBSQ HOSP IP/OBS MODERATE 35: CPT | Performed by: NURSE PRACTITIONER

## 2021-01-29 PROCEDURE — 80048 BASIC METABOLIC PNL TOTAL CA: CPT

## 2021-01-29 PROCEDURE — 96372 THER/PROPH/DIAG INJ SC/IM: CPT

## 2021-01-29 PROCEDURE — A9575 INJ GADOTERATE MEGLUMI 0.1ML: HCPCS | Performed by: FAMILY MEDICINE

## 2021-01-29 PROCEDURE — 74011250636 HC RX REV CODE- 250/636: Performed by: FAMILY MEDICINE

## 2021-01-29 PROCEDURE — 65660000000 HC RM CCU STEPDOWN

## 2021-01-29 PROCEDURE — 83735 ASSAY OF MAGNESIUM: CPT

## 2021-01-29 RX ORDER — GADOTERATE MEGLUMINE 376.9 MG/ML
10 INJECTION INTRAVENOUS
Status: COMPLETED | OUTPATIENT
Start: 2021-01-29 | End: 2021-01-29

## 2021-01-29 RX ADMIN — ASPIRIN 325 MG: 325 TABLET, FILM COATED ORAL at 10:55

## 2021-01-29 RX ADMIN — ENOXAPARIN SODIUM 40 MG: 100 INJECTION SUBCUTANEOUS at 10:56

## 2021-01-29 RX ADMIN — GADOTERATE MEGLUMINE 10 ML: 376.9 INJECTION INTRAVENOUS at 16:38

## 2021-01-29 RX ADMIN — Medication 10 ML: at 21:42

## 2021-01-29 RX ADMIN — Medication 10 ML: at 06:00

## 2021-01-29 RX ADMIN — DILTIAZEM HYDROCHLORIDE 120 MG: 120 CAPSULE, COATED, EXTENDED RELEASE ORAL at 21:41

## 2021-01-29 RX ADMIN — Medication 5 ML: at 14:08

## 2021-01-29 RX ADMIN — BENZTROPINE MESYLATE 0.5 MG: 1 TABLET ORAL at 21:42

## 2021-01-29 RX ADMIN — ATORVASTATIN CALCIUM 40 MG: 40 TABLET, FILM COATED ORAL at 10:55

## 2021-01-29 RX ADMIN — LAMOTRIGINE 75 MG: 25 TABLET ORAL at 10:55

## 2021-01-29 NOTE — PROGRESS NOTES
Problem: Mobility Impaired (Adult and Pediatric)  Goal: *Acute Goals and Plan of Care (Insert Text)  Description: FUNCTIONAL STATUS PRIOR TO ADMISSION: Patient was modified independent using a rollator for functional mobility. Does not drive. Lives in supportive housing. Last fall was 1 week ago. HOME SUPPORT: The patient lived alone with only local support is her . Physical Therapy Goals  Initiated 1/28/2021  1. Patient will move from supine to sit and sit to supine , scoot up and down, and roll side to side in bed with supervision/set-up within 7 day(s). 2.  Patient will transfer from bed to chair and chair to bed with supervision/set-up using the least restrictive device within 7 day(s). 3.  Patient will perform sit to stand with supervision/set-up within 7 day(s). 4.  Patient will ambulate with supervision/set-up for 150 feet with the least restrictive device within 7 day(s). Outcome: Progressing Towards Goal     PHYSICAL THERAPY TREATMENT  Patient: Renea Michelle (89 y.o. female)  Date: 1/29/2021  Diagnosis: Seizure ([facility] [address] [address] [R56.9]  Alcohol withdrawal ([facility] [address] [address] [F10.239] <principal problem not specified>       Precautions: Fall  Chart, physical therapy assessment, plan of care and goals were reviewed. ASSESSMENT  Patient continues with skilled PT services and is progressing towards goals. Mild improvements in gait deviations today but continues to demonstrate impaired balance and coordination, poor RW management and decreased insight into safety and deficits. When educated on her deficits and high fall risk she reports this is her \"normal\". Recommended SNF rehab as she lives alone but patient is adamant about returning home. Recommend she have supervision at home but she has limited support in the community.     Current Level of Function Impacting Discharge (mobility/balance): min A for OOB mobility and gait around room, safety concerns, high fall risk    Other factors to consider for discharge: lives alone at home, limited support         PLAN :  Patient continues to benefit from skilled intervention to address the above impairments. Continue treatment per established plan of care. to address goals. Recommendation for discharge: (in order for the patient to meet his/her long term goals)  Therapy up to 5 days/week in SNF setting or intensive home health therapy program    This discharge recommendation:  A follow-up discussion with the attending provider and/or case management is planned    IF patient discharges home will need the following DME: patient owns DME required for discharge       SUBJECTIVE:   Patient stated This is how I usually walk.     OBJECTIVE DATA SUMMARY:   Critical Behavior:  Neurologic State: Alert  Orientation Level: Oriented to person, Oriented to place, Disoriented to situation  Cognition: Decreased attention/concentration, Follows commands, Poor safety awareness, Impulsive  Safety/Judgement: Awareness of environment, Decreased awareness of need for assistance, Decreased awareness of need for safety, Decreased insight into deficits, Fall prevention  Functional Mobility Training:  Bed Mobility:     Supine to Sit: Stand-by assistance     Scooting: Stand-by assistance        Transfers:  Sit to Stand: Contact guard assistance  Stand to Sit: Contact guard assistance                             Balance:  Sitting: Intact  Standing: Impaired  Standing - Static: Good;Constant support  Standing - Dynamic : Fair  Ambulation/Gait Training:  Distance (ft): 60 Feet (ft)(2 trials)  Assistive Device: Gait belt;Walker, rolling  Ambulation - Level of Assistance: Minimal assistance;Contact guard assistance        Gait Abnormalities: Ataxic;Trunk sway increased; Shuffling gait        Base of Support: Narrowed     Speed/Zenia: Pace decreased (<100 feet/min)  Step Length: Right shortened;Left shortened  Swing Pattern: Right asymmetrical;Left asymmetrical       Therapeutic Exercises/ Neuro Re-ed:   Supine ankle pumps x10 alt x10, heel slides alt x10, sup punches x10, alt x10  Seated LAQ alt x15  Standing with arms out stretched 15sec, CGA  Standing marches alt with RW x10. Pain Ratin/10    Activity Tolerance:   Good    After treatment patient left in no apparent distress:   Supine in bed, Call bell within reach, and Bed / chair alarm activated    COMMUNICATION/COLLABORATION:   The patients plan of care was discussed with: Registered nurse.      Kendall Henry, PT   Time Calculation: 18 mins
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
## 2021-01-29 NOTE — PROGRESS NOTES
Bedside and Verbal shift change report given to [facility] (oncoming nurse) by [address] (offgoing nurse). Report included the following information SBAR, Kardex, MAR, Recent Results, Cardiac Rhythm NSR and Dual Neuro Assessment.
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
## 2021-01-29 NOTE — PROGRESS NOTES
Problem: Self Care Deficits Care Plan (Adult)  Goal: *Acute Goals and Plan of Care (Insert Text)  Description:   FUNCTIONAL STATUS PRIOR TO ADMISSION: Patient was modified independent using a rollator for functional mobility. Does not drive. Lives in supportive housing. Last fall was 1 week ago. HOME SUPPORT: The patient lived alone with only local support is her . Occupational Therapy Goals  Initiated 1/28/2021  1. Patient will perform lower body dressing with supervision/set-up using adaptive equipment as needed within 7 day(s). 2.  Patient will perform grooming standing at sink with supervision/set-up within 7 day(s). 3.  Patient will perform toilet transfers with supervision/set-up within 7 day(s). 4.  Patient will perform all aspects of toileting with supervision/set-up within 7 day(s). 5.  Patient will participate in upper extremity therapeutic exercise/activities with modified independence for 10 minutes within 7 day(s). 6.  Patient will utilize energy conservation techniques during functional activities with verbal and visual cues within 7 day(s). Outcome: Progressing Towards Goal     OCCUPATIONAL THERAPY TREATMENT  Patient: Jackson George (81 y.o. female)  Date: 1/29/2021  Diagnosis: Seizure ([facility] [R56.9]  Alcohol withdrawal ([facility] [F10.239] <principal problem not specified>       Precautions: Fall  Chart, occupational therapy assessment, plan of care, and goals were reviewed. ASSESSMENT  Patient continues with skilled OT services and is progressing towards goals. She remains limited by poor insight into impairments and safety awareness, impulsive and with decreased balance. She required max cues to safely manage RW and she was unaware of difficulties like running into door and then unable to go further without changing position of RW. Based on above pt is at a high risk of falling. Recommend 24/7 assist for safety.      Current Level of Function Impacting Discharge (ADLs): min A, set-up and RW for safe amb    Other factors to consider for discharge: see above         PLAN :  Patient continues to benefit from skilled intervention to address the above impairments. Continue treatment per established plan of care. to address goals. Recommend with staff: up to chair  for meals with bed alarm; toileting in bathroom using gait belt and RW    Recommend next OT session: standing balance, safety awareness    Recommendation for discharge: (in order for the patient to meet his/her long term goals)  Therapy up to 5 days/week in SNF setting or an intensive home health therapy program    This discharge recommendation:  Has not yet been discussed the attending provider and/or case management    IF patient discharges home will need the following DME: TBD       SUBJECTIVE:   Patient stated I know I have memory problems.     OBJECTIVE DATA SUMMARY:   Cognitive/Behavioral Status:  Neurologic State: Alert  Orientation Level: Oriented to person;Oriented to place; Disoriented to situation  Cognition: Decreased attention/concentration; Follows commands;Poor safety awareness; Impulsive  Perception: Appears intact  Perseveration: Perseverates during ADLS  Safety/Judgement: Awareness of environment;Decreased awareness of need for assistance;Decreased awareness of need for safety;Decreased insight into deficits; Fall prevention    Functional Mobility and Transfers for ADLs:  Bed Mobility:  Supine to Sit: Stand-by assistance  Scooting: Stand-by assistance    Transfers:  Sit to Stand: Contact guard assistance  Functional Transfers  Bathroom Mobility: Minimum assistance(pt with poor RW management and no awareness)  Toilet Transfer : Minimum assistance  Cues: Physical assistance; Tactile cues provided;Verbal cues provided;Visual cues provided  Adaptive Equipment: Grab bars; Walker (comment)       Balance:  Sitting: Intact  Standing: Impaired; With support  Standing - Static: Fair;Constant support  Standing - Dynamic : Fair;Constant support    ADL Intervention:  Grooming  Grooming Assistance: Contact guard assistance  Position Performed: Standing  Washing Face: Contact guard assistance  Washing Hands: Contact guard assistance  Brushing Teeth: Contact guard assistance  Cues: Tactile cues provided;Verbal cues provided;Visual cues provided    Lower Body Dressing Assistance  Socks: Minimum assistance(To thread over R toes)  Leg Crossed Method Used: Yes(L only)  Position Performed: Bending forward method;Seated edge of bed  Cues: Don;Physical assistance;Verbal cues provided;Visual cues provided    Toileting  Toileting Assistance: Contact guard assistance  Bladder Hygiene: Stand-by assistance  Bowel Hygiene: Stand-by assistance  Clothing Management: Minimum assistance  Cues: Tactile cues provided;Verbal cues provided;Visual cues provided  Adaptive Equipment: Walker;Grab bars    Cognitive Retraining  Orientation Retraining: Situation  Safety/Judgement: Awareness of environment;Decreased awareness of need for assistance;Decreased awareness of need for safety;Decreased insight into deficits; Fall prevention      Pain:  0/10    Activity Tolerance:   Fair    After treatment patient left in no apparent distress:   Sitting in chair, Call bell within reach, and Bed / chair alarm activated    COMMUNICATION/COLLABORATION:   The patients plan of care was discussed with: Registered nurse.      Mona Akhtar OT  Time Calculation: 25 mins

## 2021-01-30 VITALS
SYSTOLIC BLOOD PRESSURE: 148 MMHG | HEART RATE: 72 BPM | HEIGHT: 66 IN | TEMPERATURE: 98.2 F | RESPIRATION RATE: 18 BRPM | BODY MASS INDEX: 16.05 KG/M2 | WEIGHT: 99.9 LBS | OXYGEN SATURATION: 96 % | DIASTOLIC BLOOD PRESSURE: 82 MMHG

## 2021-01-30 LAB
ANION GAP SERPL CALC-SCNC: 5 MMOL/L (ref 5–15)
BASOPHILS # BLD: 0 K/UL (ref 0–0.1)
BASOPHILS NFR BLD: 1 % (ref 0–1)
BUN SERPL-MCNC: 9 MG/DL (ref 6–20)
BUN/CREAT SERPL: 19 (ref 12–20)
CALCIUM SERPL-MCNC: 9 MG/DL (ref 8.5–10.1)
CHLORIDE SERPL-SCNC: 109 MMOL/L (ref 97–108)
CO2 SERPL-SCNC: 26 MMOL/L (ref 21–32)
CREAT SERPL-MCNC: 0.48 MG/DL (ref 0.55–1.02)
DIFFERENTIAL METHOD BLD: ABNORMAL
EOSINOPHIL # BLD: 0 K/UL (ref 0–0.4)
EOSINOPHIL NFR BLD: 1 % (ref 0–7)
ERYTHROCYTE [DISTWIDTH] IN BLOOD BY AUTOMATED COUNT: 13 % (ref 11.5–14.5)
GLUCOSE SERPL-MCNC: 113 MG/DL (ref 65–100)
HCT VFR BLD AUTO: 35.9 % (ref 35–47)
HGB BLD-MCNC: 12.3 G/DL (ref 11.5–16)
IMM GRANULOCYTES # BLD AUTO: 0 K/UL (ref 0–0.04)
IMM GRANULOCYTES NFR BLD AUTO: 0 % (ref 0–0.5)
LYMPHOCYTES # BLD: 1 K/UL (ref 0.8–3.5)
LYMPHOCYTES NFR BLD: 33 % (ref 12–49)
MAGNESIUM SERPL-MCNC: 1.4 MG/DL (ref 1.6–2.4)
MCH RBC QN AUTO: 32.1 PG (ref 26–34)
MCHC RBC AUTO-ENTMCNC: 34.3 G/DL (ref 30–36.5)
MCV RBC AUTO: 93.7 FL (ref 80–99)
MONOCYTES # BLD: 0.3 K/UL (ref 0–1)
MONOCYTES NFR BLD: 11 % (ref 5–13)
NEUTS SEG # BLD: 1.6 K/UL (ref 1.8–8)
NEUTS SEG NFR BLD: 54 % (ref 32–75)
NRBC # BLD: 0 K/UL (ref 0–0.01)
NRBC BLD-RTO: 0 PER 100 WBC
PLATELET # BLD AUTO: 124 K/UL (ref 150–400)
PMV BLD AUTO: 9.1 FL (ref 8.9–12.9)
POTASSIUM SERPL-SCNC: 3.6 MMOL/L (ref 3.5–5.1)
RBC # BLD AUTO: 3.83 M/UL (ref 3.8–5.2)
SODIUM SERPL-SCNC: 140 MMOL/L (ref 136–145)
WBC # BLD AUTO: 3 K/UL (ref 3.6–11)

## 2021-01-30 PROCEDURE — 74011250636 HC RX REV CODE- 250/636: Performed by: INTERNAL MEDICINE

## 2021-01-30 PROCEDURE — 96376 TX/PRO/DX INJ SAME DRUG ADON: CPT

## 2021-01-30 PROCEDURE — 96372 THER/PROPH/DIAG INJ SC/IM: CPT

## 2021-01-30 PROCEDURE — 74011250636 HC RX REV CODE- 250/636: Performed by: FAMILY MEDICINE

## 2021-01-30 PROCEDURE — 80048 BASIC METABOLIC PNL TOTAL CA: CPT

## 2021-01-30 PROCEDURE — 83735 ASSAY OF MAGNESIUM: CPT

## 2021-01-30 PROCEDURE — 85025 COMPLETE CBC W/AUTO DIFF WBC: CPT

## 2021-01-30 PROCEDURE — 36415 COLL VENOUS BLD VENIPUNCTURE: CPT

## 2021-01-30 PROCEDURE — 99218 HC RM OBSERVATION: CPT

## 2021-01-30 PROCEDURE — 74011250637 HC RX REV CODE- 250/637: Performed by: FAMILY MEDICINE

## 2021-01-30 RX ORDER — LANOLIN ALCOHOL/MO/W.PET/CERES
400 CREAM (GRAM) TOPICAL 2 TIMES DAILY
Qty: 90 TAB | Refills: 1 | Status: SHIPPED | OUTPATIENT
Start: 2021-01-30 | End: 2022-06-17 | Stop reason: ALTCHOICE

## 2021-01-30 RX ORDER — MAGNESIUM SULFATE HEPTAHYDRATE 40 MG/ML
4 INJECTION, SOLUTION INTRAVENOUS ONCE
Status: COMPLETED | OUTPATIENT
Start: 2021-01-30 | End: 2021-01-30

## 2021-01-30 RX ADMIN — ACETAMINOPHEN 650 MG: 325 TABLET ORAL at 10:51

## 2021-01-30 RX ADMIN — ASPIRIN 325 MG: 325 TABLET, FILM COATED ORAL at 10:46

## 2021-01-30 RX ADMIN — Medication 10 ML: at 13:23

## 2021-01-30 RX ADMIN — Medication 10 ML: at 06:00

## 2021-01-30 RX ADMIN — ATORVASTATIN CALCIUM 40 MG: 40 TABLET, FILM COATED ORAL at 10:46

## 2021-01-30 RX ADMIN — ENOXAPARIN SODIUM 40 MG: 100 INJECTION SUBCUTANEOUS at 10:46

## 2021-01-30 RX ADMIN — LAMOTRIGINE 75 MG: 25 TABLET ORAL at 10:46

## 2021-01-30 RX ADMIN — MAGNESIUM SULFATE HEPTAHYDRATE 4 G: 40 INJECTION, SOLUTION INTRAVENOUS at 13:22

## 2021-01-30 NOTE — PROGRESS NOTES
TRANSFER - OUT REPORT:    Verbal report given to Patti RN(name) on [facility]  being transferred to (unit) for routine progression of care       Report consisted of patients Situation, Background, Assessment and   Recommendations(SBAR). Information from the following report(s) SBAR, Kardex, Intake/Output, MAR, Recent Results, Cardiac Rhythm NSR and Dual Neuro Assessment was reviewed with the receiving nurse. Lines:   Peripheral IV 01/27/21 Right Forearm (Active)   Site Assessment Clean, dry, & intact 01/29/21 0920   Phlebitis Assessment 0 01/29/21 0920   Infiltration Assessment 0 01/29/21 0920   Dressing Status Clean, dry, & intact 01/29/21 0920   Dressing Type Transparent 01/29/21 0920   Hub Color/Line Status Pink;Capped 01/29/21 0920   Action Taken Open ports on tubing capped 01/29/21 0800   Alcohol Cap Used Yes 01/29/21 0800        Opportunity for questions and clarification was provided.       Patient transported with:   Registered Nurse

## 2021-01-30 NOTE — DISCHARGE SUMMARY
Discharge Summary       PATIENT ID: Arpita Romeo  MRN: [id number]   YOB: 1960    DATE OF ADMISSION: 1/27/2021  5:27 PM    DATE OF DISCHARGE: 01/30/21   PRIMARY CARE PROVIDER: Rosy Hartley NP     ATTENDING PHYSICIAN: Luis Alberto Fontenot MD  DISCHARGING PROVIDER: Luis Alberto Fontenot MD    To contact this individual call [phone] and ask the  to page. If unavailable ask to be transferred the Adult Hospitalist Department. CONSULTATIONS: IP CONSULT TO NEUROLOGY    PROCEDURES/SURGERIES: * No surgery found *    ADMITTING DIAGNOSES & HOSPITAL COURSE:   # Seizure Activity concern. No confirmatory evidence. No AEC per neurology. EEG normal.  # ETOH abuse: Reports drinking 2 beers most days. No withdrawals on going. # Hypomagnesemia  # Elevated Troponin, peaked. Noncardiac. # Bipolar d/o, continue lamictal,and cogentin  # HTN        DISCHARGE DIAGNOSES / PLAN:      # Seizure Activity  -witnessed by EMS, and nurse in the ED s/p keppra load, and ativan in the ED  -CT head with no acute findings  -etiology unclear, though may be 2/2 ETOH withdrawal? UDS negative, ETOH <10  -stop  keppra 500mg BID: check MRI: Ordered. Prelim and neuro read not significant for any acute events. -PRN valium per CIWAA protocol  -Consulted neurologist. [id number] [address]da to DC. No AED. -EEG ordered: normal EEG     #ETOH abuse: Reports drinking 2 beers most days. -ETOH <10  -can no quantify amt.  -last drink 3 days ago  -CIWAA  -IV MVI, replete electrolytes PRN     #Elevated Troponin, peaked. -no chest discomfort or anginal equivalent  -EKG with no acute findings     #Bipolar d/o  -continue lamictal,and cogentin  -needs official med rec once more alert/and coherent     #HTN  -restart diltiazem, though will need official med-rec     DVT ppx: lovenox  MPOA: Jerome Lincoln (son)>states has nmber in the phone, but difficulty finding it. Code: Full     Disposition: DC home with [facility] today.      ADDITIONAL CARE RECOMMENDATIONS:   Follow up with PCP within 1 week with CBC, CMP, Mg, Phos level check   Follow up with NPH clinic at [facility] for further evaluation  · It is important that you take the medication exactly as they are prescribed. · Keep your medication in the bottles provided by the pharmacist and keep a list of the medication names, dosages, and times to be taken in your wallet. · Do not take other medications without consulting your doctor. · No drinking alcohol or driving car or operating machinery if you are on narcotic pain medications. Donot take sedating mediations if you are sleepy or confused. · Fall Precautions  · Keep Well Hydrated  · Report to your medical provider if you feel you have  developed allergies to medications  · Follow up with your PCP or Consultant for medication adjustments and refills  · Monitor for signs of fevers,chills,bleeding,chest pain and seek medical attention if you do so.          DIET: Cardiac Diet     ACTIVITY: Activity as tolerated     WOUND CARE: NA     EQUIPMENT needed: NA     PENDING TEST RESULTS:   At the time of discharge the following test results are still pending: none    FOLLOW UP APPOINTMENTS:    Follow-up Information     Follow up With Specialties Details Why Contact Adan Patel NP Nurse Practitioner Schedule an appointment as soon as possible for a visit in 1 week As needed, If symptoms worsen and within 1 week for post hospital discharge follow up-check CBC, CMP, [address]  [address] [facility]  [phone]      [facility] clinic  Schedule an appointment as soon as possible for a visit Please make appointment to check with [facility] NPH clinic as recommended by neurologist.                DISCHARGE MEDICATIONS:  Current Discharge Medication List      CONTINUE these medications which have CHANGED    Details   magnesium oxide (MAG-OX) 400 mg tablet Take 1 Tab by mouth two (2) times a day.  Dose increased from 1 tab daily to 1 tab bid  Qty: 90 Tab, Refills: 1 CONTINUE these medications which have NOT CHANGED    Details   folic acid (FOLVITE) 1 mg tablet Take 1 Tab by mouth daily. Qty: 90 Tab, Refills: 3    Associated Diagnoses: History of alcohol abuse      cholecalciferol (VITAMIN D3) (2,000 UNITS /50 MCG) cap capsule Take 2,000 Units by mouth daily. Qty: 90 Cap, Refills: 3      dilTIAZem ER (Cardizem CD) 120 mg capsule Take 1 Cap by mouth nightly. Qty: 90 Cap, Refills: 1      midodrine (PROAMATINE) 2.5 mg tablet Take 1 Tab by mouth daily. Qty: 90 Tab, Refills: 1    Comments: Add to bubble pack next time it is filled      gabapentin (Neurontin) 100 mg capsule Take 2 Caps by mouth three (3) times daily. Qty: 270 Cap, Refills: 1    Comments: FAXED TO [facility]  Associated Diagnoses: Akathisia      miscellaneous medical supply (Blood Pressure Cuff) misc Use daily to check BP  Qty: 1 Each, Refills: 0    Associated Diagnoses: Essential hypertension      thiamine HCL (VITAMIN B-1) 100 mg tablet Take 1 Tab by mouth daily. Qty: 90 Tab, Refills: 3    Associated Diagnoses: History of alcohol abuse      citalopram (CELEXA) 10 mg tablet Take  by mouth daily. benztropine (COGENTIN) 0.5 mg tablet Take 0.5 mg by mouth nightly. lamoTRIgine (LAMICTAL) 25 mg tablet Take 75 mg by mouth daily. NOTIFY YOUR PHYSICIAN FOR ANY OF THE FOLLOWING:   Fever over 101 degrees for 24 hours. Chest pain, shortness of breath, fever, chills, nausea, vomiting, diarrhea, change in mentation, falling, weakness, bleeding. Severe pain or pain not relieved by medications. Or, any other signs or symptoms that you may have questions about.     DISPOSITION:  x  Home With:  x OT x PT x HH x RN       Long term SNF/Inpatient Rehab    Independent/assisted living    Hospice    Other:       PATIENT CONDITION AT DISCHARGE:     Functional status    Poor    x Deconditioned     Independent      Cognition    x Lucid     Forgetful     Dementia      Catheters/lines (plus indication)    Jackie PICC     PEG    x None      Code status   x  Full code     DNR      PHYSICAL EXAMINATION AT DISCHARGE:  Visit Vitals  BP (!) 148/82 (BP 1 Location: Left upper arm, BP Patient Position: Sitting)   Pulse 72   Temp 98.2 °F (36.8 °C)   Resp 18   Ht 5' 6\" (1.676 m)   Wt 45.3 kg (99 lb 14.4 oz)   SpO2 96%   BMI 16.12 kg/m²       General:          WD, WN. Alert, cooperative, no acute distress    EENT:              EOMI. Anicteric sclerae. MMM  Resp:               CTA bilaterally, no wheezing or rales. No accessory muscle use  CV:                  Regular  rhythm,  No edema  GI:                   Soft, Non distended, Non tender.  +Bowel sounds  Neurologic:       Alert and oriented X 3, normal speech,   Psych:   Not anxious nor agitated  Skin:                No rashes.   No jaundice      CHRONIC MEDICAL DIAGNOSES:  Problem List as of 1/30/2021 Date Reviewed: 10/2/2020          Codes Class Noted - Resolved    Alcohol withdrawal ([facility] ICD-10-CM: I81.120  ICD-9-CM: 291.81  1/27/2021 - Present        Seizure ([facility] ICD-10-CM: R56.9  ICD-9-CM: 780.39  1/27/2021 - Present        At high risk for injury related to fall ICD-10-CM: Z91.81  ICD-9-CM: V49.89  10/1/2020 - Present        Hyperlipidemia ICD-10-CM: E78.5  ICD-9-CM: 272.4  9/3/2019 - Present        Vitamin D deficiency ICD-10-CM: E55.9  ICD-9-CM: 268.9  9/3/2019 - Present        Elevated alkaline phosphatase level ICD-10-CM: R74.8  ICD-9-CM: 790.5  9/3/2019 - Present        Bipolar 1 disorder ([facility] ICD-10-CM: F31.9  ICD-9-CM: 296.7  4/23/2019 - Present        History of fracture of right hip ICD-10-CM: Z87.81  ICD-9-CM: V15.51  4/23/2019 - Present        History of alcohol abuse ICD-10-CM: F10.11  ICD-9-CM: 305.03  4/23/2019 - Present        Akathisia ICD-10-CM: G25.71  ICD-9-CM: 781.0  4/23/2019 - Present        Resting tremor ICD-10-CM: G25.2  ICD-9-CM: 781.0  4/23/2019 - Present        History of DVT (deep vein thrombosis) ICD-10-CM: F75.583  ICD-9-CM: V12.51 4/23/2019 - Present        Drug-induced mood disorder(292.84) ICD-10-CM: F19.94  ICD-9-CM: 292.84  6/14/2015 - Present        Shingles ICD-10-CM: B02.9  ICD-9-CM: 053.9  6/14/2015 - Present        Hypertension ICD-10-CM: I10  ICD-9-CM: 401.9  6/14/2015 - Present        Rosacea ICD-10-CM: L71.9  ICD-9-CM: 695.3  6/24/2014 - Present        Alcohol dependence ([facility] ICD-10-CM: F10.20  ICD-9-CM: 303.90  6/23/2014 - Present        Personality disorder ([facility] ICD-10-CM: F60.9  ICD-9-CM: 301.9  6/23/2014 - Present        Non-compliance with treatment ICD-10-CM: Z91.19  ICD-9-CM: V15.81  6/23/2014 - Present        RESOLVED: Depression ICD-10-CM: F32.9  ICD-9-CM: 209  6/14/2015 - 6/14/2015        RESOLVED: Alcohol withdrawal ([facility] ICD-10-CM: C76.788  ICD-9-CM: 291.81  6/23/2014 - 6/14/2015        RESOLVED: PTSD (post-traumatic stress disorder) ICD-10-CM: F43.10  ICD-9-CM: 309.81  6/23/2014 - 6/14/2015            Radiology  Ct Head Wo Cont    Result Date: 1/27/2021  No evidence of acute process     Xr Chest Port    Result Date: 1/27/2021  No acute cardiopulmonary process.       Recent Results (from the past 24 hour(s))   METABOLIC PANEL, BASIC    Collection Time: 01/30/21  3:49 AM   Result Value Ref Range    Sodium 140 136 - 145 mmol/L    Potassium 3.6 3.5 - 5.1 mmol/L    Chloride 109 (H) 97 - 108 mmol/L    CO2 26 21 - 32 mmol/L    Anion gap 5 5 - 15 mmol/L    Glucose 113 (H) 65 - 100 mg/dL    BUN 9 6 - 20 MG/DL    Creatinine 0.48 (L) 0.55 - 1.02 MG/DL    BUN/Creatinine ratio 19 12 - 20      GFR est AA >60 >60 ml/min/1.73m2    GFR est non-AA >60 >60 ml/min/1.73m2    Calcium 9.0 8.5 - 10.1 MG/DL   MAGNESIUM    Collection Time: 01/30/21  3:49 AM   Result Value Ref Range    Magnesium 1.4 (L) 1.6 - 2.4 mg/dL   CBC WITH AUTOMATED DIFF    Collection Time: 01/30/21  3:49 AM   Result Value Ref Range    WBC 3.0 (L) 3.6 - 11.0 K/uL    RBC 3.83 3.80 - 5.20 M/uL    HGB 12.3 11.5 - 16.0 g/dL    HCT 35.9 35.0 - 47.0 %    MCV 93.7 80.0 - 99.0 FL    MCH 32.1 26.0 - 34.0 PG    MCHC 34.3 30.0 - 36.5 g/dL    RDW 13.0 11.5 - 14.5 %    PLATELET 950 (L) 436 - 400 K/uL    MPV 9.1 8.9 - 12.9 FL    NRBC 0.0 0  WBC    ABSOLUTE NRBC 0.00 0.00 - 0.01 K/uL    NEUTROPHILS 54 32 - 75 %    LYMPHOCYTES 33 12 - 49 %    MONOCYTES 11 5 - 13 %    EOSINOPHILS 1 0 - 7 %    BASOPHILS 1 0 - 1 %    IMMATURE GRANULOCYTES 0 0.0 - 0.5 %    ABS. NEUTROPHILS 1.6 (L) 1.8 - 8.0 K/UL    ABS. LYMPHOCYTES 1.0 0.8 - 3.5 K/UL    ABS. MONOCYTES 0.3 0.0 - 1.0 K/UL    ABS. EOSINOPHILS 0.0 0.0 - 0.4 K/UL    ABS. BASOPHILS 0.0 0.0 - 0.1 K/UL    ABS. IMM.  GRANS. 0.0 0.00 - 0.04 K/UL    DF AUTOMATED         Greater than 40 minutes were spent with the patient on counseling and coordination of care    Signed:   Rinku Rodriguez MD  1/30/2021  1:11 PM

## 2021-01-30 NOTE — DISCHARGE INSTRUCTIONS
Discharge Instructions       PATIENT ID: Desire Hummel  MRN: [id number]   YOB: 1960    DATE OF ADMISSION: 1/27/2021  5:27 PM    DATE OF DISCHARGE: 1/30/2021    PRIMARY CARE PROVIDER: Berny Mazariegos NP     ATTENDING PHYSICIAN: Macy Mcgregor MD  DISCHARGING PROVIDER: Deb Gilbert MD    To contact this individual call [phone] and ask the  to page. If unavailable ask to be transferred the Adult Hospitalist Department. DISCHARGE DIAGNOSES   # Seizure Activity concern. No confirmatory evidence. No AEC per neurology. EEG normal.  # ETOH abuse: Reports drinking 2 beers most days. No withdrawals on going. # Hypomagnesemia  # Elevated Troponin, peaked. Noncardiac. # Bipolar d/o, continue lamictal,and cogentin  # HTN    CONSULTATIONS: IP CONSULT TO NEUROLOGY    PROCEDURES/SURGERIES: * No surgery found *    PENDING TEST RESULTS:   At the time of discharge the following test results are still pending: none    FOLLOW UP APPOINTMENTS:   Follow-up Information     Follow up With Specialties Details Why Contact Info    Berny Mazariegos NP Nurse Practitioner Schedule an appointment as soon as possible for a visit in 1 week As needed, If symptoms worsen and within 1 week for post hospital discharge follow up-check CBC, CMP, [address]  [phone]      [facility]  Schedule an appointment as soon as possible for a visit Please make appointment to check with [facility] N[facility] clinic as recommended by neurologist.            ADDITIONAL CARE RECOMMENDATIONS:   Follow up with PCP within 1 week with CBC, CMP, Mg, Phos level check   Follow up with NPH clinic at [facility] for further evaluation  · It is important that you take the medication exactly as they are prescribed. · Keep your medication in the bottles provided by the pharmacist and keep a list of the medication names, dosages, and times to be taken in your wallet.    · Do not take other medications without consulting your doctor. · No drinking alcohol or driving car or operating machinery if you are on narcotic pain medications. Donot take sedating mediations if you are sleepy or confused. · Fall Precautions  · Keep Well Hydrated  · Report to your medical provider if you feel you have  developed allergies to medications  · Follow up with your PCP or Consultant for medication adjustments and refills  · Monitor for signs of fevers,chills,bleeding,chest pain and seek medical attention if you do so. DIET: Cardiac Diet    ACTIVITY: Activity as tolerated    WOUND CARE: NA    EQUIPMENT needed: NA      Radiology:  Ct Head Wo Cont    Result Date: 1/27/2021  No evidence of acute process     Xr Chest Port    Result Date: 1/27/2021  No acute cardiopulmonary process. DISCHARGE MEDICATIONS:   See Medication Reconciliation Form    · It is important that you take the medication exactly as they are prescribed. · Keep your medication in the bottles provided by the pharmacist and keep a list of the medication names, dosages, and times to be taken in your wallet. · Do not take other medications without consulting your doctor. NOTIFY YOUR PHYSICIAN FOR ANY OF THE FOLLOWING:   Fever over 101 degrees for 24 hours. Chest pain, shortness of breath, fever, chills, nausea, vomiting, diarrhea, change in mentation, falling, weakness, bleeding. Severe pain or pain not relieved by medications. Or, any other signs or symptoms that you may have questions about.       DISPOSITION:  x  Home With:  x OT x PT x HH x RN       SNF/Inpatient Rehab/LTAC    Independent/assisted living    Hospice    Other:     CDMP Checked:   Yes x     PROBLEM LIST Updated:  Yes x        My Medications      CHANGE how you take these medications      Instructions Each Dose to Equal Morning Noon Evening Bedtime   magnesium oxide 400 mg tablet  Commonly known as: MAG-OX  What changed:   · when to take this  · additional instructions    Your last dose was: Your next dose is: Take 1 Tab by mouth two (2) times a day. Dose increased from 1 tab daily to 1 tab bid   400 mg                    CONTINUE taking these medications      Instructions Each Dose to Equal Morning Noon Evening Bedtime   benztropine 0.5 mg tablet  Commonly known as: COGENTIN    Your last dose was: Your next dose is: Take 0.5 mg by mouth nightly. 0.5 mg                 CeleXA 10 mg tablet  Generic drug: citalopram    Your last dose was: Your next dose is: Take  by mouth daily. cholecalciferol (2,000 UNITS /50 MCG) Cap capsule  Commonly known as: VITAMIN D3    Your last dose was: Your next dose is: Take 2,000 Units by mouth daily. 2,000 Units                 dilTIAZem  mg capsule  Commonly known as: Cardizem CD    Your last dose was: Your next dose is: Take 1 Cap by mouth nightly. 120 mg                 folic acid 1 mg tablet  Commonly known as: FOLVITE    Your last dose was: Your next dose is: Take 1 Tab by mouth daily. 1 mg                 gabapentin 100 mg capsule  Commonly known as: Neurontin    Your last dose was: Your next dose is: Take 2 Caps by mouth three (3) times daily. 200 mg                 LaMICtaL 25 mg tablet  Generic drug: lamoTRIgine    Your last dose was: Your next dose is: Take 75 mg by mouth daily. 75 mg                 midodrine 2.5 mg tablet  Commonly known as: PROAMATINE    Your last dose was: Your next dose is: Take 1 Tab by mouth daily. 2.5 mg                 miscellaneous medical supply Misc  Commonly known as: Blood Pressure Cuff    Your last dose was: Your next dose is:         Use daily to check BP                  thiamine  mg tablet  Commonly known as: Vitamin B-1    Your last dose was: Your next dose is: Take 1 Tab by mouth daily.    100 mg                       Where to Get Your Medications      These medications were sent to [address], [address] [address]    Phone: [phone]   · magnesium oxide 400 mg tablet         Signed:   Luis Alberto Fontenot MD  1/30/2021  1:04 PM

## 2021-01-30 NOTE — PROGRESS NOTES
Transition of Care Plan   RUR- 12% Low Risk   DISPOSITION: The disposition plan is home with home health - pending referral via [employer].  Transport: [employer]/ trip #:[id number]    CM contacted [address] [place] [address] [address] [place] and spoke with the representative to schedule patients ride for discharge. CM updated patients address as well. CM provided representative with the unit number [phone] to call upon arrival.    Patient  address:  [facility]. [address] [place] [address] Dr    Drop off address:  [address] [facility]. [address] [facility]    Trip number: [id number]    CM submitted [facility] referrals via [employer]. The Plan for Transition of Care is related to the following treatment goals: Home Health    The Patient and/or patient representative was provided with a choice of provider and agrees   with the discharge plan. [x] Yes [] No    Freedom of choice list was provided with basic dialogue that supports the patient's individualized plan of care/goals, treatment preferences and shares the quality data associated with the providers. [x] Yes [] No  CM will continue to follow. CM submitted referrals via All [employer] to: [facility] - still pending  [id number]  [facility] - unable to accept  [facility]iel- unable to accept patient at this time  [facility] - still pending  [address] - will have to wait until Monday to review benefits    CM contacted pts assigned nursed to update on the following; at this time placement is still pending. CM will continue to follow.     PADMINI Anton, CRM

## 2021-01-31 NOTE — PROGRESS NOTES
This morning,CM contacted [facility] to inquire about services. CM was provided with fax number: [phone] Phone number: [phone]. CM attempted to fax face sheet and HH order 5x however was unsuccessful. CM contacted facility and spoke with Jennifer Hsieh. CM left a voice message for representative to return phone call. CM will continue to follow up. At 11:38am - CM received a call from [phone] representative who reported she received patients clinical chart and is currently reviewing. At 1:39pm - CM contacted [facility] agency - and representative reported, \"we are still reviewing her chart. \" CM will continue to follow.     PADMINI Huizar, CRM

## 2021-02-01 ENCOUNTER — TELEPHONE (OUTPATIENT)
Dept: INTERNAL MEDICINE CLINIC | Age: 61
End: 2021-02-01

## 2021-02-01 NOTE — PROGRESS NOTES
Please call our neurology and see if we can get her in ASAP for abnormal brain MRI. She will need help coordinating this.  Let's call any of our offices to see who can get her in the quickest.

## 2021-02-01 NOTE — TELEPHONE ENCOUNTER
Pt. Is followed by [facility] and was on a telemed appt. When seizure happened.

## 2021-02-01 NOTE — PROGRESS NOTES
Pt. Scheduled 2/4/21 10a arrival with Litzy aYn NP with Dr Enrique Figueredo [phone]. Dr David Ramirez not available until end of Feb. Pt. Also has appt. With [facility] Np 2/8/21.

## 2021-02-01 NOTE — TELEPHONE ENCOUNTER
Patrick Serrano with [facility] is requesting a call providing verbal confirmation that Monica Shultz will follow the patient for Home Healthcare.             Patrick Serrano with [facility] [phone]

## 2021-02-04 ENCOUNTER — OFFICE VISIT (OUTPATIENT)
Dept: NEUROLOGY | Age: 61
End: 2021-02-04
Payer: MEDICAID

## 2021-02-04 VITALS
OXYGEN SATURATION: 98 % | HEART RATE: 100 BPM | HEIGHT: 66 IN | WEIGHT: 99 LBS | DIASTOLIC BLOOD PRESSURE: 60 MMHG | SYSTOLIC BLOOD PRESSURE: 100 MMHG | BODY MASS INDEX: 15.91 KG/M2

## 2021-02-04 DIAGNOSIS — R56.9 OBSERVED SEIZURE-LIKE ACTIVITY (HCC): Primary | ICD-10-CM

## 2021-02-04 DIAGNOSIS — F03.90 MULTIFACTORIAL DEMENTIA (HCC): ICD-10-CM

## 2021-02-04 DIAGNOSIS — G91.2 NORMAL PRESSURE HYDROCEPHALUS (HCC): ICD-10-CM

## 2021-02-04 DIAGNOSIS — G24.01 TARDIVE DYSKINESIA: ICD-10-CM

## 2021-02-04 PROCEDURE — 99215 OFFICE O/P EST HI 40 MIN: CPT | Performed by: NURSE PRACTITIONER

## 2021-02-04 NOTE — PATIENT INSTRUCTIONS
Diet, weight loss concern Encourage grocery store visits and healthy meals Consider meals on wheels Add multivitamin daily Explore getting an around the neck alert system Ok to start new med for TD. May help her gait. Encourage exercise. Getting supervised outdoor activities. Get up and walk around every hour. Staying in bed and watching TV not a good idea. MRI with evidence of dementia and hydrocephalus Referral to [facility] NPH (normal pressure hydrocephalus) Clinic
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
## 2021-02-04 NOTE — PROGRESS NOTES
MRI brain w/wo contrast reviewed with neuroradiology. Pt's primary issue is hydrocephalus, not mentioned in impressions. The signal in the mesial temporal region is slightly increased on right compared to left, both have volume loss, but global atrophy is seen. This is not mesial temporal sclerosis. There is no indication that pt has epilepsy at this time. This will be d/w pt at her f/u appt today.

## 2021-02-05 ENCOUNTER — TELEPHONE (OUTPATIENT)
Dept: NEUROLOGY | Age: 61
End: 2021-02-05

## 2021-02-05 NOTE — TELEPHONE ENCOUNTER
Wants to ensure the notes are faxed to [facility], they need to review them before they will schedule an appt.       Santa Miller [phone]

## 2021-02-08 ENCOUNTER — OFFICE VISIT (OUTPATIENT)
Dept: INTERNAL MEDICINE CLINIC | Age: 61
End: 2021-02-08
Payer: MEDICAID

## 2021-02-08 VITALS
HEIGHT: 66 IN | HEART RATE: 84 BPM | WEIGHT: 103.8 LBS | TEMPERATURE: 97.6 F | SYSTOLIC BLOOD PRESSURE: 130 MMHG | OXYGEN SATURATION: 98 % | DIASTOLIC BLOOD PRESSURE: 90 MMHG | BODY MASS INDEX: 16.68 KG/M2 | RESPIRATION RATE: 16 BRPM

## 2021-02-08 DIAGNOSIS — E83.42 HYPOMAGNESEMIA: ICD-10-CM

## 2021-02-08 DIAGNOSIS — G25.71 AKATHISIA: ICD-10-CM

## 2021-02-08 DIAGNOSIS — Z09 HOSPITAL DISCHARGE FOLLOW-UP: ICD-10-CM

## 2021-02-08 DIAGNOSIS — F10.11 HISTORY OF ALCOHOL ABUSE: Primary | ICD-10-CM

## 2021-02-08 DIAGNOSIS — Z91.81 AT HIGH RISK FOR INJURY RELATED TO FALL: ICD-10-CM

## 2021-02-08 DIAGNOSIS — I10 ESSENTIAL HYPERTENSION: ICD-10-CM

## 2021-02-08 PROCEDURE — 99495 TRANSJ CARE MGMT MOD F2F 14D: CPT | Performed by: NURSE PRACTITIONER

## 2021-02-08 RX ORDER — BISMUTH SUBSALICYLATE 262 MG
1 TABLET,CHEWABLE ORAL DAILY
Qty: 90 TAB | Refills: 3 | Status: SHIPPED | OUTPATIENT
Start: 2021-02-08 | End: 2022-06-17 | Stop reason: ALTCHOICE

## 2021-02-08 NOTE — PROGRESS NOTES
Subjective:      Lazara Candelaria is a 63 y.o. female who presents today for hospital follow up. Hospital follow up: she was admitted 1/27/21-1/30/21 for seizures, there is some question if this is related to alcohol withdrawal. Hx of bipolar, tardive dyskinesia, orthostatic hypotension, and SVT. CT of head was normal, EEG normal. She saw neurology post discharge with NP Vida Wagoner on 2/4 for hydrocephalus with 10 year history. She is waiting to hear back from [facility] neurology to schedule shunt. She is waiting to hear back from psychiatry about dosage for starting new medication Ingrezza . She has home health and PT, OT set up. She is working on improving her nutrition, encouraged walking to improve strength. She will start protein shakes. Patient Active Problem List    Diagnosis Date Noted    Alcohol withdrawal ([facility] 01/27/2021    Seizure ([facility] 01/27/2021    At high risk for injury related to fall 10/01/2020    Hyperlipidemia 09/03/2019    Vitamin D deficiency 09/03/2019    Elevated alkaline phosphatase level 09/03/2019    Bipolar 1 disorder ([facility] 04/23/2019    History of fracture of right hip 04/23/2019    History of alcohol abuse 04/23/2019    Akathisia 04/23/2019    Resting tremor 04/23/2019    History of DVT (deep vein thrombosis) 04/23/2019    Drug-induced mood disorder(292.84) 06/14/2015    Shingles 06/14/2015    Hypertension 06/14/2015    Rosacea 06/24/2014    Alcohol dependence ([facility] 06/23/2014    Personality disorder ([facility] 06/23/2014    Non-compliance with treatment 06/23/2014     Current Outpatient Medications   Medication Sig Dispense Refill    magnesium oxide (MAG-OX) 400 mg tablet Take 1 Tab by mouth two (2) times a day. Dose increased from 1 tab daily to 1 tab bid 90 Tab 1    folic acid (FOLVITE) 1 mg tablet Take 1 Tab by mouth daily. 90 Tab 3    cholecalciferol (VITAMIN D3) (2,000 UNITS /50 MCG) cap capsule Take 2,000 Units by mouth daily.  90 Cap 3    dilTIAZem ER (Cardizem CD) 120 mg capsule Take 1 Cap by mouth nightly. 90 Cap 1    midodrine (PROAMATINE) 2.5 mg tablet Take 1 Tab by mouth daily. 90 Tab 1    gabapentin (Neurontin) 100 mg capsule Take 2 Caps by mouth three (3) times daily. 270 Cap 1    miscellaneous medical supply (Blood Pressure Cuff) misc Use daily to check BP 1 Each 0    thiamine HCL (VITAMIN B-1) 100 mg tablet Take 1 Tab by mouth daily. 90 Tab 3    citalopram (CELEXA) 10 mg tablet Take  by mouth daily.  benztropine (COGENTIN) 0.5 mg tablet Take 0.5 mg by mouth nightly.  lamoTRIgine (LAMICTAL) 25 mg tablet Take 75 mg by mouth daily. Allergies   Allergen Reactions    Chocolate [Cocoa] Hives    Peanut Other (comments)     Past Medical History:   Diagnosis Date    Akathisia     Bipolar 1 disorder ([address]     DVT (deep venous thrombosis) (HCC)     ETOH abuse     Hypertension     Shingles      Past Surgical History:   Procedure Laterality Date    HX ORTHOPAEDIC      Broken hip R/Neck surgery. Review of Systems    A comprehensive review of systems was negative except for that written in the HPI. Objective:     Visit Vitals  BP (!) 130/90 (BP 1 Location: Right upper arm, BP Patient Position: Sitting, BP Cuff Size: Adult)   Pulse 84   Temp 97.6 °F (36.4 °C) (Temporal)   Resp 16   Ht 5' 6\" (1.676 m)   Wt 103 lb 12.8 oz (47.1 kg)   SpO2 98%   BMI 16.75 kg/m²     General appearance: alert, cooperative, no distress, appears stated age  Head: Normocephalic, without obvious abnormality, atraumatic  Eyes: negative  Lungs: clear to auscultation bilaterally  Heart: regular rate and rhythm, S1, S2 normal, no murmur, click, rub or gallop  Extremities: extremities normal, atraumatic, no cyanosis or edema  Pulses: 2+ and symmetric  Skin: Skin color, texture, turgor normal. No rashes or lesions  Neurologic: Resting tremor noted   Psych: appropriate mood, speech, affect    Nursing note and vitals reviewed  Assessment/Plan:   1.  History of alcohol abuse  - multivitamin (ONE A DAY) tablet; Take 1 Tab by mouth daily. Dispense: 90 Tab; Refill: 3  - PHOSPHORUS; Future    2. At high risk for injury related to fall  - continue OT, PT    3. Akathisia    4. Essential hypertension  - METABOLIC PANEL, COMPREHENSIVE; Future  - CBC WITH AUTOMATED DIFF; Future    5. Hospital discharge follow-up  - PHOSPHORUS; Future    6. Hypomagnesemia  - MAGNESIUM; Future  - PHOSPHORUS; Future    Follow-up and Dispositions    · Return in about 3 months (around 5/8/2021) for routine follow up. Get labs done this month . Advised her to call back or return to office if symptoms worsen/change/persist.  Discussed expected course/resolution/complications of diagnosis in detail with patient. Medication risks/benefits/costs/interactions/alternatives discussed with patient. She was given an after visit summary which includes diagnoses, current medications, & vitals. She expressed understanding with the diagnosis and plan.

## 2021-02-24 ENCOUNTER — OFFICE VISIT (OUTPATIENT)
Dept: CARDIOLOGY CLINIC | Age: 61
End: 2021-02-24
Payer: MEDICAID

## 2021-02-24 VITALS — WEIGHT: 105 LBS | HEIGHT: 66 IN | BODY MASS INDEX: 16.88 KG/M2 | RESPIRATION RATE: 16 BRPM

## 2021-02-24 DIAGNOSIS — R06.09 DYSPNEA ON EXERTION: Primary | ICD-10-CM

## 2021-02-24 DIAGNOSIS — I95.1 ORTHOSTATIC HYPOTENSION: ICD-10-CM

## 2021-02-24 DIAGNOSIS — E87.6 HYPOKALEMIA: ICD-10-CM

## 2021-02-24 DIAGNOSIS — I47.1 SVT (SUPRAVENTRICULAR TACHYCARDIA) (HCC): ICD-10-CM

## 2021-02-24 DIAGNOSIS — R42 DIZZINESS: ICD-10-CM

## 2021-02-24 DIAGNOSIS — E83.42 HYPOMAGNESEMIA: ICD-10-CM

## 2021-02-24 PROCEDURE — 99214 OFFICE O/P EST MOD 30 MIN: CPT | Performed by: NURSE PRACTITIONER

## 2021-02-24 RX ORDER — MIDODRINE HYDROCHLORIDE 2.5 MG/1
TABLET ORAL
Qty: 135 TAB | Refills: 1 | Status: SHIPPED | OUTPATIENT
Start: 2021-02-24 | End: 2022-06-17 | Stop reason: SDUPTHER

## 2021-02-24 RX ORDER — VALBENAZINE 40 MG/1
CAPSULE ORAL
COMMUNITY
Start: 2021-02-16

## 2021-02-24 NOTE — PATIENT INSTRUCTIONS
Please increase midodrine to 2.5mg in the morning and 1.25mg at lunch time Please have labs drawn today at the [facility] Someone will contact you about scheduling your cardiac MRI

## 2021-02-24 NOTE — PROGRESS NOTES
Pronounced \"Luis Manuel-di\"    HPI: Erick Barrientos, a 57y.o. year-old who presents for follow up regarding her orthostatic hypotension. Last visit her BP was too high on midodrine BID and she was advised to cut her dose to once daily  Then she was hospitalized with seizures and found to have hydrocephalus, she is now seeing [facility] about getting a shunt   She is orthostatic today - 140/80 supine, 94/72 standing  She reports improvement in her dizziness  Says her dyspnea is doing better  She still has dyspnea with exertion but thinks some of her dyspnea at rest was anxiety related  She saw Dr. Art Ramirez and he told her that her CXR was ok and everything looked ok, hasn't needed to use the inhaler that was prescribed  Will request records from his office for review  She was unable to get dobutamine stress echo due to dyspnea  Per Dr. Marta Vanegas - \"I do not believe that she can lay still for nuclear imaging. Nor can she walk on the treadmill due to her shortness of breath\"  Mask is a major trigger for her dyspnea as well   She was also recently diagnosed with dementia too - going to see if balance and memory issues are related to dementia or hydrocephalus  She continues to have falls and injuries related to her falls  Has tardive dyskinesia from [place], now on Ingressa for this  No chest pain or palpitations  Discussed her SVT found on her loop monitor, nothing recent  No LE edema   Unclear situation, hard to say how much of her fall risk is TDK vs SVT vs orthostatic hypotension  Sounds like home monitor reads SBP 20 mmHg higher than it actually is based on last office visits numbers    **After her last visit a pulmonary note was received dated 1/11/21  Per pulmonary, she has no clear signs or symptoms of obstructive lung disease although given her orthopedic issues she certainly may have some restrictive physiology.   Unfortunately at this time given her severe tar dive dyskinesia that she would unlikely be able to complete baseline PFTs. We will try Breo and encouraged her to follow-up with her psychiatrist about her tar dive dyskinesia. We will readdress PFTs once her tar dive Dyskinesia is better controlled    Assessment/Plan:  1. Dizziness/lightheadedness/falls - advised her to stay hydrated, add sodium to her diet, continue midodrine as below  -no significant bradycardia or arrhythmias on loop monitor 11/20  -TTE WNL  2. Orthostatic hypotension - encouraged her to stay hydrated and increase sodium intake  -on diltiazem CD   -still orthostatic so advised her to increase midodrine to 2.5mg in AM and 1.25mg at lunch time  -will reassess BP at next visit in April 2021  3. Dyspnea with exertion - improved, dobutamine stress echo cancelled due to extreme dyspnea at [facility], discussed with Dr. Marino Kehr and she recommended discussing cardiac MRI with perfusion versus dobutamine stress echo with patient today  -discussed both tests today and patient prefers to proceed with cardiac MRI with perfusion, she has had several MRIs in the past and denies any claustrophobia or anxiety regarding MRIs, will order cardiac MRI with perfusion  -she saw pulmonary/Dr. Glo Hernandez and said her pulmonary workup was negative, will request his records for review   -she will follow up with Dr. Marino Kehr again in April 2021  4. Tardive dyskinesia - on Ingressa    5. SVT - noted on loop monitor, doing well on diltiazem CD now   6. Hypomagnesemia - Mg 1.4, now on Mg Oxide 400mg BID, will recheck Mg level today  7. Hypokalemia - K 3.6, will recheck BMP today   8. Hydrocephalus - awaiting shunt, management per [facility] neurosurgery  9.   New seizure disorder - management per neurology    Loop Monitor 11/20 - +SVT    Echo 11/20 - EF 59%, grade 1 dd, no valve abnormalities  Venous duplex 10/20 - no DVT    Fam Hx: father passed at age 68 from pneumonia, had multiple strokes and one heart attack prior to that  Soc Hx: no tobacco use, heavy etoh until a few years ago, now only has 1-2 beers/weekend    She  has a past medical history of Akathisia, Bipolar 1 disorder ([facility] DVT (deep venous thrombosis) ([facility] ETOH abuse, Hypertension, and Shingles. She also has no past medical history of Second hand smoke exposure. Cardiovascular ROS: no chest pain or palpitations   Respiratory ROS: no cough or wheezing  Neurological ROS: no TIA or stroke symptoms  All other systems negative except as above. PE  Vitals:    02/24/21 1307   Resp: 16   Weight: 105 lb (47.6 kg)   Height: 5' 6\" (1.676 m)    Body mass index is 16.95 kg/m².    General appearance - very thin, alert, in no distress, + dyskinesia  Mental status - affect appropriate to mood  Eyes - sclera anicteric, moist mucous membranes  Neck - supple  Lymphatics - not assessed  Chest - clear to auscultation, no wheezes, rales or rhonchi  Heart - normal rate, regular rhythm, normal S1, S2, no murmurs, rubs, clicks or gallops  Abdomen - soft, nontender, nondistended  Back exam - full range of motion, no tenderness  Neurological - cranial nerves II through XII grossly intact, no focal deficit  Musculoskeletal - no muscular tenderness noted, normal strength  Extremities - peripheral pulses normal, no pedal edema  Skin - normal coloration  no rashes    Recent Labs:  Lab Results   Component Value Date/Time    Cholesterol, total 210 (H) 08/29/2019 09:59 AM    HDL Cholesterol 72 08/29/2019 09:59 AM    LDL, calculated 112 (H) 08/29/2019 09:59 AM    Triglyceride 131 08/29/2019 09:59 AM    CHOL/HDL Ratio 3.0 06/15/2015 06:22 AM     Lab Results   Component Value Date/Time    Creatinine 0.48 (L) 01/30/2021 03:49 AM     Lab Results   Component Value Date/Time    BUN 9 01/30/2021 03:49 AM     Lab Results   Component Value Date/Time    Potassium 3.6 01/30/2021 03:49 AM     No results found for: HBA1C, HGBE8, WEM9LHLS, GTY3GOLU  Lab Results   Component Value Date/Time    HGB 12.3 01/30/2021 03:49 AM     Lab Results   Component Value Date/Time    PLATELET 571 (L) 18/42/6885 03:49 AM       Reviewed:  Past Medical History:   Diagnosis Date    Akathisia     Bipolar 1 disorder ([address]     [facility] venous thrombosis) ([facility])     ETOH abuse     Hypertension     Shingles      Social History     Tobacco Use   Smoking Status Never Smoker   Smokeless Tobacco Never Used     Social History     Substance and Sexual Activity   Alcohol Use Yes    Alcohol/week: 2.0 standard drinks    Types: 2 Glasses of wine per week    Frequency: Monthly or less    Drinks per session: 1 or 2    Binge frequency: Never     Allergies   Allergen Reactions    Chocolate [Cocoa] Hives    Peanut Other (comments)       Current Outpatient Medications   Medication Sig    Ingrezza 40 mg cap     midodrine (PROAMATINE) 2.5 mg tablet Take one tablet PO with breakfast and 1/2 tablet PO with lunch    multivitamin (ONE A DAY) tablet Take 1 Tab by mouth daily.  magnesium oxide (MAG-OX) 400 mg tablet Take 1 Tab by mouth two (2) times a day. Dose increased from 1 tab daily to 1 tab bid    folic acid (FOLVITE) 1 mg tablet Take 1 Tab by mouth daily.  cholecalciferol (VITAMIN D3) (2,000 UNITS /50 MCG) cap capsule Take 2,000 Units by mouth daily.  dilTIAZem ER (Cardizem CD) 120 mg capsule Take 1 Cap by mouth nightly.  gabapentin (Neurontin) 100 mg capsule Take 2 Caps by mouth three (3) times daily.  miscellaneous medical supply (Blood Pressure Cuff) misc Use daily to check BP    thiamine HCL (VITAMIN B-1) 100 mg tablet Take 1 Tab by mouth daily.  citalopram (CELEXA) 10 mg tablet Take  by mouth daily.  benztropine (COGENTIN) 0.5 mg tablet Take 0.5 mg by mouth nightly.  lamoTRIgine (LAMICTAL) 25 mg tablet Take 75 mg by mouth daily. No current facility-administered medications for this visit.         Cathi Mcardle, NP  [facility] [address], [address] [facility] [address]  [place], [facility]  [phone]

## 2021-03-03 ENCOUNTER — TELEPHONE (OUTPATIENT)
Dept: INTERNAL MEDICINE CLINIC | Age: 61
End: 2021-03-03

## 2021-03-03 NOTE — TELEPHONE ENCOUNTER
Logan Bello with [facility] [place] returned Rocío's call. Could not find a nurse to take the call.     [phone]

## 2021-03-03 NOTE — TELEPHONE ENCOUNTER
[address]  The patient fell and struck her head and had confusion she has been sent to [facility]

## 2021-03-04 NOTE — TELEPHONE ENCOUNTER
Carloz Nunez from [facility] [place] calling for Humberto Martinez again.

## 2021-03-05 NOTE — TELEPHONE ENCOUNTER
Spoke with [employer] from [facility] 3/3/21 Pt. Jose F Guerra backwards hit her head no loc, small laceration, erratic behavior, went to [facility] ETOH 235, released. Records requested. Pt. Has an appt. 3/15/21 regarding a shunt.

## 2021-03-10 ENCOUNTER — PATIENT MESSAGE (OUTPATIENT)
Dept: INTERNAL MEDICINE CLINIC | Age: 61
End: 2021-03-10

## 2021-03-10 DIAGNOSIS — G25.71 AKATHISIA: ICD-10-CM

## 2021-03-10 RX ORDER — GABAPENTIN 100 MG/1
200 CAPSULE ORAL 3 TIMES DAILY
Qty: 270 CAP | Refills: 1 | Status: SHIPPED | OUTPATIENT
Start: 2021-03-10 | End: 2021-05-26 | Stop reason: SDUPTHER

## 2021-03-19 ENCOUNTER — TELEPHONE (OUTPATIENT)
Dept: INTERNAL MEDICINE CLINIC | Age: 61
End: 2021-03-19

## 2021-03-19 NOTE — TELEPHONE ENCOUNTER
Jaclyn Rodriguez with [facility] [place] [phone]     Cari Barroso will be faxing over a signature log for NP Skiff to sign -- also needs her printed name on the order.

## 2021-05-04 ENCOUNTER — APPOINTMENT (OUTPATIENT)
Dept: CT IMAGING | Age: 61
End: 2021-05-04
Attending: STUDENT IN AN ORGANIZED HEALTH CARE EDUCATION/TRAINING PROGRAM
Payer: MEDICAID

## 2021-05-04 ENCOUNTER — HOSPITAL ENCOUNTER (EMERGENCY)
Age: 61
Discharge: HOME | End: 2021-05-04
Attending: EMERGENCY MEDICINE
Payer: MEDICAID

## 2021-05-04 VITALS
RESPIRATION RATE: 18 BRPM | OXYGEN SATURATION: 98 % | SYSTOLIC BLOOD PRESSURE: 138 MMHG | TEMPERATURE: 97.8 F | DIASTOLIC BLOOD PRESSURE: 84 MMHG | HEART RATE: 84 BPM

## 2021-05-04 DIAGNOSIS — S32.018A OTHER CLOSED FRACTURE OF FIRST LUMBAR VERTEBRA, INITIAL ENCOUNTER (HCC): Primary | ICD-10-CM

## 2021-05-04 DIAGNOSIS — N28.89 RIGHT KIDNEY MASS: ICD-10-CM
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
## 2021-05-04 DIAGNOSIS — M51.36 DEGENERATIVE DISC DISEASE, LUMBAR: ICD-10-CM

## 2021-05-04 LAB
ALBUMIN SERPL-MCNC: 4.1 G/DL (ref 3.5–5)
ALBUMIN/GLOB SERPL: 1 {RATIO} (ref 1.1–2.2)
ALP SERPL-CCNC: 250 U/L (ref 45–117)
ALT SERPL-CCNC: 86 U/L (ref 12–78)
ANION GAP SERPL CALC-SCNC: 8 MMOL/L (ref 5–15)
AST SERPL-CCNC: 103 U/L (ref 15–37)
BASOPHILS # BLD: 0 K/UL (ref 0–0.1)
BASOPHILS NFR BLD: 0 % (ref 0–1)
BILIRUB SERPL-MCNC: 0.8 MG/DL (ref 0.2–1)
BUN SERPL-MCNC: 8 MG/DL (ref 6–20)
BUN/CREAT SERPL: 15 (ref 12–20)
CALCIUM SERPL-MCNC: 9.5 MG/DL (ref 8.5–10.1)
CHLORIDE SERPL-SCNC: 105 MMOL/L (ref 97–108)
CO2 SERPL-SCNC: 26 MMOL/L (ref 21–32)
CREAT SERPL-MCNC: 0.53 MG/DL (ref 0.55–1.02)
DIFFERENTIAL METHOD BLD: ABNORMAL
EOSINOPHIL # BLD: 0 K/UL (ref 0–0.4)
EOSINOPHIL NFR BLD: 1 % (ref 0–7)
ERYTHROCYTE [DISTWIDTH] IN BLOOD BY AUTOMATED COUNT: 12.8 % (ref 11.5–14.5)
ETHANOL SERPL-MCNC: <10 MG/DL
GLOBULIN SER CALC-MCNC: 4.1 G/DL (ref 2–4)
GLUCOSE SERPL-MCNC: 101 MG/DL (ref 65–100)
HCT VFR BLD AUTO: 38.4 % (ref 35–47)
HGB BLD-MCNC: 12.9 G/DL (ref 11.5–16)
IMM GRANULOCYTES # BLD AUTO: 0 K/UL (ref 0–0.04)
IMM GRANULOCYTES NFR BLD AUTO: 0 % (ref 0–0.5)
LYMPHOCYTES # BLD: 0.7 K/UL (ref 0.8–3.5)
LYMPHOCYTES NFR BLD: 20 % (ref 12–49)
MAGNESIUM SERPL-MCNC: 1.6 MG/DL (ref 1.6–2.4)
MCH RBC QN AUTO: 31.8 PG (ref 26–34)
MCHC RBC AUTO-ENTMCNC: 33.6 G/DL (ref 30–36.5)
MCV RBC AUTO: 94.6 FL (ref 80–99)
MONOCYTES # BLD: 0.4 K/UL (ref 0–1)
MONOCYTES NFR BLD: 11 % (ref 5–13)
NEUTS SEG # BLD: 2.2 K/UL (ref 1.8–8)
NEUTS SEG NFR BLD: 68 % (ref 32–75)
NRBC # BLD: 0 K/UL (ref 0–0.01)
NRBC BLD-RTO: 0 PER 100 WBC
PLATELET # BLD AUTO: 129 K/UL (ref 150–400)
PMV BLD AUTO: 8.5 FL (ref 8.9–12.9)
POTASSIUM SERPL-SCNC: 3.8 MMOL/L (ref 3.5–5.1)
PROT SERPL-MCNC: 8.2 G/DL (ref 6.4–8.2)
RBC # BLD AUTO: 4.06 M/UL (ref 3.8–5.2)
RBC MORPH BLD: ABNORMAL
SODIUM SERPL-SCNC: 139 MMOL/L (ref 136–145)
WBC # BLD AUTO: 3.3 K/UL (ref 3.6–11)

## 2021-05-04 PROCEDURE — 80053 COMPREHEN METABOLIC PANEL: CPT

## 2021-05-04 PROCEDURE — 36415 COLL VENOUS BLD VENIPUNCTURE: CPT

## 2021-05-04 PROCEDURE — 99284 EMERGENCY DEPT VISIT MOD MDM: CPT

## 2021-05-04 PROCEDURE — 70450 CT HEAD/BRAIN W/O DYE: CPT

## 2021-05-04 PROCEDURE — 72131 CT LUMBAR SPINE W/O DYE: CPT

## 2021-05-04 PROCEDURE — 82077 ASSAY SPEC XCP UR&BREATH IA: CPT

## 2021-05-04 PROCEDURE — 85025 COMPLETE CBC W/AUTO DIFF WBC: CPT

## 2021-05-04 PROCEDURE — 83735 ASSAY OF MAGNESIUM: CPT

## 2021-05-04 PROCEDURE — 74011250637 HC RX REV CODE- 250/637: Performed by: STUDENT IN AN ORGANIZED HEALTH CARE EDUCATION/TRAINING PROGRAM

## 2021-05-04 RX ORDER — IBUPROFEN 400 MG/1
800 TABLET ORAL ONCE
Status: COMPLETED | OUTPATIENT
Start: 2021-05-04 | End: 2021-05-04

## 2021-05-04 RX ORDER — IBUPROFEN 800 MG/1
800 TABLET ORAL
Qty: 20 TAB | Refills: 0 | Status: SHIPPED | OUTPATIENT
Start: 2021-05-04 | End: 2021-05-11

## 2021-05-04 RX ADMIN — IBUPROFEN 800 MG: 400 TABLET, FILM COATED ORAL at 13:30

## 2021-05-04 NOTE — ED NOTES
Patient (s)  given copy of dc instructions and 1 script(s). Patient (s)  verbalized understanding of instructions and script (s). Patient given a current medication reconciliation form and verbalized understanding of their medications. Patient (s) verbalized understanding of the importance of discussing medications with  his or her physician or clinic they will be following up with. Patient alert and oriented and in no acute distress. Patient discharged home ambulatory with all belongings. CM set up cab for pt. Pt provided with snacks and water for her wait.

## 2021-05-04 NOTE — PROGRESS NOTES
5/4/2021; 15:00 -   CM notes CM Consult for discharge transport. Per patient's request, CM contacted patient's  with [address] [facility]: [phone]) to notify of discharge; [facility] is not able to provide discharge transport. CM contacted [facility]: [phone]); CM provided ED Registration () for updates on the transport. [employer] has a 3 hour window to respond for the transportation; transport is anticipated by 18:15.   Trip ID: [id number]  CRM: Jimmy Cameron, MPH, [address] [place] [address]; Z: [phone]

## 2021-05-04 NOTE — ED TRIAGE NOTES
Patient presents from home with complaints of fall that occurred one week ago in the shower. Patient reports she struck her lower back when she fell and is now experiencing \"lots of pain\".  Patient denies hitting head or LOC

## 2021-05-08 NOTE — ED PROVIDER NOTES
Patient is a 57year old female with history of akathesia, bipolar 1 disorder, DVT, ETOH abuse, HTN who presents to ED c/o low back pain which started 1 week prior after GLF. Patient reports she was in her bathroom tripped bathroom and fell backwards landing on a porcelain edge. Patient reports unknown if she hit her head because her back was more painful. Patient denies LOC. Patient reports pain 10/10 and that it was relieved with naproxen, but states she ran out. Patient denies any leg numbness or weakness, abdominal pain, nausea, vomiting, fever, chills, urinary retention, decreased urination, and syncope. Patient reports she has a care taker who is typically with her for doctor's appointments and knows patient's history, but she could not be present today. Past Medical History:   Diagnosis Date    Akathisia     Bipolar 1 disorder ([facility]     D[facility] venous thrombosis) ([facility]     ETOH abuse     Hypertension     Shingles        Past Surgical History:   Procedure Laterality Date    HX ORTHOPAEDIC      Broken hip R/Neck surgery. Family History:   Problem Relation Age of Onset    Diabetes Mother        Social History     Socioeconomic History    Marital status: SINGLE     Spouse name: Not on file    Number of children: Not on file    Years of education: Not on file    Highest education level: Not on file   Occupational History    Not on file   Social Needs    Financial resource strain: Not on file    Food insecurity     Worry: Not on file     Inability: Not on file    Transportation needs     Medical: Not on file     Non-medical: Not on file   Tobacco Use    Smoking status: Never Smoker    Smokeless tobacco: Never Used   Substance and Sexual Activity    Alcohol use:  Yes     Alcohol/week: 2.0 standard drinks     Types: 2 Glasses of wine per week     Frequency: Monthly or less     Drinks per session: 1 or 2     Binge frequency: Never    Drug use: No    Sexual activity: Not on file   Lifestyle    Physical activity     Days per week: Not on file     Minutes per session: Not on file    Stress: Not on file   Relationships    Social connections     Talks on phone: Not on file     Gets together: Not on file     Attends Jain service: Not on file     Active member of club or organization: Not on file     Attends meetings of clubs or organizations: Not on file     Relationship status: Not on file    Intimate partner violence     Fear of current or ex partner: Not on file     Emotionally abused: Not on file     Physically abused: Not on file     Forced sexual activity: Not on file   Other Topics Concern    Not on file   Social History Narrative    Not on file         ALLERGIES: Chocolate [cocoa] and Peanut    Review of Systems   Constitutional: Negative for chills and fever. HENT: Negative for congestion and sore throat. Eyes: Negative for pain and discharge. Respiratory: Negative for cough and shortness of breath. Cardiovascular: Negative for chest pain. Gastrointestinal: Negative for abdominal pain, diarrhea, nausea and vomiting. Genitourinary: Negative for dysuria and urgency. Musculoskeletal: Positive for back pain. Negative for gait problem, neck pain and neck stiffness. Skin: Negative for rash. Allergic/Immunologic: Negative for immunocompromised state. Neurological: Negative for dizziness, syncope, weakness, numbness and headaches. Psychiatric/Behavioral: Negative for confusion. All other systems reviewed and are negative. Vitals:    05/04/21 1223 05/04/21 1524   BP: (!) 157/95 138/84   Pulse: 91 84   Resp: 16 18   Temp: 97.8 °F (36.6 °C)    SpO2: 100% 98%            Physical Exam  Vitals signs and nursing note reviewed. Constitutional:       Appearance: She is normal weight. HENT:      Head: Normocephalic and atraumatic.       Nose: Nose normal.      Mouth/Throat:      Mouth: Mucous membranes are moist.   Eyes:      Conjunctiva/sclera: Conjunctivae normal.   Neck:      Musculoskeletal: Normal range of motion and neck supple. Cardiovascular:      Rate and Rhythm: Normal rate and regular rhythm. Pulses: Normal pulses. Heart sounds: Normal heart sounds. Pulmonary:      Effort: Pulmonary effort is normal.      Breath sounds: Normal breath sounds. Abdominal:      Palpations: Abdomen is soft. Tenderness: There is no abdominal tenderness. Musculoskeletal: Normal range of motion. Comments: Midline tenderness in lumbar region with bilateral muscle tenderness. Full ROM of bilateral lower extremities. Ambulates with walker at baseline. NVI distally. Skin:     General: Skin is warm and dry. Capillary Refill: Capillary refill takes less than 2 seconds. Neurological:      Mental Status: She is alert. Mental status is at baseline. Comments: Akithesia - at baseline    Psychiatric:         Mood and Affect: Mood normal.          MDM  Number of Diagnoses or Management Options  Degenerative disc disease, lumbar  Fall, initial encounter  Other closed fracture of first lumbar vertebra, initial encounter ([facility]  Right kidney mass  Diagnosis management comments: Patient with fall last week with back pain. Unknown if head injury - denied to nurse but told me she was unsure. On review of patient's chart she had another fall in March and was scheduled to have a shunt placed at [address] [place] [address]. Will order head CT to rule out acute abnormalities given history is slightly limited. Labs unremarkable. Head CT without any acute abnormalities. CT L spine shows: 1. Acute superior endplate fracture of L1 2. Lobulation versus isodense mass upper pole right kidney. Recommend ultrasound to further evaluate. Discussed results with patient and patient reports pain has improved while in ED. Given patient's fall risk will recommend NSAIDs and follow-up with a spine specialist. Patient also given information for further follow-up on kidney findings.  Patient understands and agrees with plan. All questions addressed and answered.         Amount and/or Complexity of Data Reviewed  Clinical lab tests: reviewed  Tests in the radiology section of CPT®: reviewed  Discuss the patient with other providers: yes (Dr. Kat Torrez, ED Attending )           Procedures

## 2021-05-11 ENCOUNTER — TELEPHONE (OUTPATIENT)
Dept: CARDIOLOGY CLINIC | Age: 61
End: 2021-05-11

## 2021-05-11 NOTE — TELEPHONE ENCOUNTER
Returned call to FELY, 2 pt identifiers used    She wanted to notify provider that she was not planning on going to her Cardiac MRI testing today and would not re-schedule any sooner. She had a fall and was seen in the ED. Future Appointments   Date Time Provider Malik Grant   6/3/2021 11:40 AM MD ALENA Cooper BS AMB   6/23/2021  2:00 PM [facility] MRI 1 SFMRI  PIETRO   8/30/2021 11:20 AM Crispin Watters DO NEUSM BS AMB     Will move out Dr. Yamileth Castaneda appointment. Future Appointments   Date Time Provider Malik Grant   6/23/2021  2:00 PM [facility] MRI 1 [id number] [facility]   7/7/2021  3:00 PM MD ALENA Cooper BS AMB   8/30/2021 11:20 AM Crispin Watters DO NEUSM BS AMB     [facility] sent to patient.

## 2021-05-11 NOTE — TELEPHONE ENCOUNTER
Prateek Martínez with central scheduling is calling to speak with Magali. Please advise.     Phone: [phone]

## 2021-05-26 DIAGNOSIS — G25.71 AKATHISIA: ICD-10-CM

## 2021-05-26 NOTE — TELEPHONE ENCOUNTER
Requested Prescriptions     Pending Prescriptions Disp Refills    gabapentin (Neurontin) 100 mg capsule 270 Capsule 1     Sig: Take 2 Capsules by mouth three (3) times daily.            [address] [facility] [address]

## 2021-05-27 RX ORDER — GABAPENTIN 100 MG/1
200 CAPSULE ORAL 3 TIMES DAILY
Qty: 270 CAPSULE | Refills: 1 | Status: SHIPPED | OUTPATIENT
Start: 2021-05-27 | End: 2022-06-17 | Stop reason: ALTCHOICE

## 2021-06-23 ENCOUNTER — PATIENT MESSAGE (OUTPATIENT)
Dept: CARDIOLOGY CLINIC | Age: 61
End: 2021-06-23

## 2021-06-30 RX ORDER — DILTIAZEM HYDROCHLORIDE 120 MG/1
120 CAPSULE, COATED, EXTENDED RELEASE ORAL
Qty: 90 CAPSULE | Refills: 1 | Status: SHIPPED | OUTPATIENT
Start: 2021-06-30 | End: 2022-01-06 | Stop reason: SDUPTHER

## 2021-06-30 NOTE — TELEPHONE ENCOUNTER
Requested Prescriptions     Signed Prescriptions Disp Refills    dilTIAZem ER (Cardizem CD) 120 mg capsule 90 Capsule 1     Sig: Take 1 Capsule by mouth nightly.      Authorizing Provider: Domenico Collins     Ordering User: Veronika Vila     Per verbal orders

## 2022-01-04 DIAGNOSIS — F10.11 HISTORY OF ALCOHOL ABUSE: ICD-10-CM

## 2022-01-04 RX ORDER — FOLIC ACID 1 MG/1
1 TABLET ORAL DAILY
Qty: 90 TABLET | Refills: 3 | OUTPATIENT
Start: 2022-01-04

## 2022-01-04 RX ORDER — ACETAMINOPHEN 500 MG
2000 TABLET ORAL DAILY
Qty: 90 CAPSULE | Refills: 3 | OUTPATIENT
Start: 2022-01-04

## 2022-01-04 RX ORDER — DILTIAZEM HYDROCHLORIDE 120 MG/1
120 CAPSULE, COATED, EXTENDED RELEASE ORAL
Qty: 90 CAPSULE | Refills: 1 | OUTPATIENT
Start: 2022-01-04

## 2022-01-04 NOTE — TELEPHONE ENCOUNTER
Requested Prescriptions     Pending Prescriptions Disp Refills    cholecalciferol (VITAMIN D3) (2,000 UNITS /50 MCG) cap capsule 90 Capsule 3     Sig: Take 1 Capsule by mouth daily.  dilTIAZem ER (Cardizem CD) 120 mg capsule 90 Capsule 1     Sig: Take 1 Capsule by mouth nightly.  folic acid (FOLVITE) 1 mg tablet 90 Tablet 3     Sig: Take 1 Tablet by mouth daily.        [facility] [facility],[address]

## 2022-01-06 ENCOUNTER — OFFICE VISIT (OUTPATIENT)
Dept: INTERNAL MEDICINE CLINIC | Age: 62
End: 2022-01-06
Payer: MEDICAID

## 2022-01-06 VITALS
BODY MASS INDEX: 18 KG/M2 | DIASTOLIC BLOOD PRESSURE: 88 MMHG | TEMPERATURE: 98.4 F | OXYGEN SATURATION: 99 % | HEIGHT: 66 IN | SYSTOLIC BLOOD PRESSURE: 136 MMHG | HEART RATE: 126 BPM | RESPIRATION RATE: 18 BRPM | WEIGHT: 112 LBS

## 2022-01-06 DIAGNOSIS — G91.3 POST-TRAUMATIC HYDROCEPHALUS (HCC): ICD-10-CM

## 2022-01-06 DIAGNOSIS — R29.6 FREQUENT FALLS: Primary | ICD-10-CM

## 2022-01-06 DIAGNOSIS — F10.10 ALCOHOL ABUSE: ICD-10-CM

## 2022-01-06 DIAGNOSIS — I47.1 SVT (SUPRAVENTRICULAR TACHYCARDIA) (HCC): ICD-10-CM

## 2022-01-06 DIAGNOSIS — F31.31 BIPOLAR AFFECTIVE DISORDER, CURRENTLY DEPRESSED, MILD (HCC): ICD-10-CM

## 2022-01-06 DIAGNOSIS — Z23 ENCOUNTER FOR IMMUNIZATION: ICD-10-CM

## 2022-01-06 DIAGNOSIS — I95.1 ORTHOSTATIC HYPOTENSION: ICD-10-CM

## 2022-01-06 DIAGNOSIS — Z00.00 HEALTHCARE MAINTENANCE: ICD-10-CM

## 2022-01-06 DIAGNOSIS — G24.01 TARDIVE DYSKINESIA: ICD-10-CM

## 2022-01-06 PROBLEM — Z91.81 AT HIGH RISK FOR FALLS: Status: ACTIVE | Noted: 2022-01-06

## 2022-01-06 PROCEDURE — 99214 OFFICE O/P EST MOD 30 MIN: CPT | Performed by: INTERNAL MEDICINE

## 2022-01-06 PROCEDURE — 90732 PPSV23 VACC 2 YRS+ SUBQ/IM: CPT | Performed by: INTERNAL MEDICINE

## 2022-01-06 PROCEDURE — 90686 IIV4 VACC NO PRSV 0.5 ML IM: CPT | Performed by: INTERNAL MEDICINE

## 2022-01-06 RX ORDER — DILTIAZEM HYDROCHLORIDE 120 MG/1
120 CAPSULE, COATED, EXTENDED RELEASE ORAL
Qty: 90 CAPSULE | Refills: 1 | Status: SHIPPED | OUTPATIENT
Start: 2022-01-06 | End: 2022-06-17 | Stop reason: ALTCHOICE

## 2022-01-06 RX ORDER — ZOSTER VACCINE RECOMBINANT, ADJUVANTED 50 MCG/0.5
0.5 KIT INTRAMUSCULAR ONCE
Qty: 0.5 ML | Refills: 0 | Status: SHIPPED | OUTPATIENT
Start: 2022-01-06 | End: 2022-01-06

## 2022-01-06 RX ORDER — TRAZODONE HYDROCHLORIDE 50 MG/1
TABLET ORAL
COMMUNITY
Start: 2022-01-03

## 2022-01-06 NOTE — PATIENT INSTRUCTIONS

## 2022-01-06 NOTE — ACP (ADVANCE CARE PLANNING)
Advance Care Planning     Advance Care Planning (ACP) Physician/NP/PA Conversation      Date of Conversation: 1/6/2022  Conducted with: Patient with Decision Making Capacity    Healthcare Decision Maker:   No healthcare decision makers have been documented. Click here to complete [address] including selection of the Healthcare Decision Maker Relationship (ie \"Primary\")      Today we discussed [address]. The patient is considering options. Care Preferences:    Hospitalization: \"If your health worsens and it becomes clear that your chance of recovery is unlikely, what would be your preference regarding hospitalization? \"  The patient is unsure. Ventilation: \"If you were unable to breathe on your own and your chance of recovery was unlikely, what would be your preference about the use of a ventilator (breathing machine) if it was available to you? \"   The patient would NOT desire the use of a ventilator. Resuscitation: \"In the event your heart stopped as a result of an underlying serious health condition, would you want attempts to be made to restart your heart, or would you prefer a natural death? \"   No, do NOT attempt to resuscitate.     Additional topics discussed: treatment goals, benefit/burden of treatment options and resuscitation preferences    Conversation Outcomes / Follow-Up Plan:   ACP complete - no further action today  Reviewed DNR/DNI and patient confirms current DNR status - completed forms on file (place new order if needed)     Length of Voluntary ACP Conversation in minutes:  <16 minutes (Non-Billable)    Fabiano Mireles MD

## 2022-01-06 NOTE — PROGRESS NOTES
Office Visit Note:    Assessment/Plan:  1. Frequent falls    2. Orthostatic hypotension    3. Tardive dyskinesia    4. Post-traumatic hydrocephalus ([address]    5. Alcohol abuse    6. SVT (supraventricular tachycardia) ([address]    7. Bipolar affective disorder, currently depressed, mild ([address]    8. Healthcare maintenance        1. Frequent falls-most likely this is multifactorial secondary to orthostatic hypotension, tardive dyskinesia, hydrocephalus. I have advised her to make an appointment to follow-up with her cardiologist.  At this time her blood pressure is stable. I think the best strategy is to get physical therapy to strengthen her muscles. But she is very adamant that she does not want to be referred to physical therapy I offered home health physical therapy but she declined that as well. If she continues to have falls we could consider stopping the Cardizem. 2. Orthostatic hypotension-she used to follow-up with cardiology, on midodrine. Will advise her to keep following up with cardiology  3. Tardive dyskinesia-she is on Ingrezza for TD, also on Cogentin. She follows up with psychiatry  4. Hydrocephalus-not a good surgical candidate, was following up with neurosurgery and neurology. 5. History of alcohol abuse-she states that she drinks 1-2 beers off and on when there is football on TV. Advised to cut down on alcohol intake. 6. SVT-on Cardizem  7.  Bipolar disorder-on Celexa, trazodone, follows up with psychiatry her PHQ-9 score was at 8          Health Maintenance:  Immunizations:  Tetanus: Received tetanus shot in the last year  Shingles: Shingles Vaccine ordered  Influenza: Received this season  Covid: Received 2 doses of Madonna vaccine      Screening:  Colon cancer: Declined  Mammogram: Declined  Cervical Cancer: States that she had a Pap smear in the last 3 years, declined further testing  Hepatitis C: Ordered today  HIV: Ordered today  Osteoporosis : Will need DEXA scan when she is done 64  Diabetes: A1c ordered today    She decided that she wants to be a DNR. Orders Placed This Encounter    traZODone (DESYREL) 50 mg tablet     Social Determinants of Health     Tobacco Use: Low Risk     Smoking Tobacco Use: Never Smoker    Smokeless Tobacco Use: Never Used   Alcohol Use:     Frequency of Alcohol Consumption: Not on file    Average Number of Drinks: Not on file    Frequency of Binge Drinking: Not on file   Financial Resource Strain:     Difficulty of Paying Living Expenses: Not on file   Food Insecurity:     Worried About Running Out of Food in the Last Year: Not on file    [place]oshni of Food in the Last Year: Not on file   Transportation Needs:     Lack of Transportation (Medical): Not on file    Lack of Transportation (Non-Medical): Not on file   Physical Activity:     Days of Exercise per Week: Not on file    Minutes of Exercise per Session: Not on file   Stress:     Feeling of Stress : Not on file   Social Connections:     Frequency of Communication with Friends and Family: Not on file    Frequency of Social Gatherings with Friends and Family: Not on file    Attends Rastafarian Services: Not on file    Active Member of Clubs or Organizations: Not on file    Attends Club or Organization Meetings: Not on file    Marital Status: Not on file   Intimate Partner Violence:     Fear of Current or Ex-Partner: Not on file    Emotionally Abused: Not on file    Physically Abused: Not on file    Sexually Abused: Not on file   Depression:     PHQ-2 Score: Not on file   Housing Stability:     Unable to Pay for Housing in the Last Year: Not on file    Number of [place] in the Last Year: Not on file    Unstable Housing in the Last Year: Not on file           I have reviewed with the patient details of the assessment and plan and all questions were answered. Relevant patient education was performed. The most recent lab findings were reviewed with the patient.   An After Visit Summary was printed and given to the patient. Reason for Visit: Establish Care and Fall (two falls; 12/22 and 1/4/2022; stitches for first fall, bruising and pain for second fall; may have been other falls in last 12 months also)      Subjective:  57 y.o. female with h/o multiple medical problems including frequent falls, orthostatic hypotension, tardive dyskinesia, hydrocephalus, bipolar disorder comes to establish with me as primary care. She is accompanied by her  who helped her with her history. She has a history of alcohol abuse in the past and has had multiple falls in the past.  She was diagnosed with orthostatic hypotension and was following cardiology for it. She developed hydrocephalus which is deemed inoperable and so was being seen by neurosurgery and neurology at one point. She is also has a history of bipolar disorder and tardive dyskinesia and is followed by psychiatry. At this time she complains of some mild headache but denies any other complaints. She does have tremors from her tardive dyskinesia she is generally weak. But she is very adamant that she wants to stay at home. She lives in the living facility alone and does not want home health services or physical therapy. She denies any chest pain or shortness of breath any abdominal pain nausea any vomiting or diarrhea constipation. Review of Systems  A complete 11 system ROS was preformed (constitutional, eyes, ENT, cardiovascular, respiratory, gastrointestinal, genitourinary, musculoskeletal, skin, neurological, psychiatric) and was negative aside from the pertinent positives and negatives noted in the HPI. Past Medical History:   Diagnosis Date    Akathisia     Bipolar 1 disorder ([address]     DVT (deep venous thrombosis) (HCC)     ETOH abuse     Hypertension     Shingles      Past Surgical History:   Procedure Laterality Date    HX ORTHOPAEDIC      Broken hip R/Neck surgery.       Social History     Socioeconomic History    Marital status: SINGLE   Tobacco Use    Smoking status: Never Smoker    Smokeless tobacco: Never Used   Vaping Use    Vaping Use: Never used   Substance and Sexual Activity    Alcohol use: Yes     Alcohol/week: 2.0 standard drinks     Types: 2 Glasses of wine per week    Drug use: No    Sexual activity: Not Currently     Family History   Problem Relation Age of Onset    Diabetes Mother      Current Outpatient Medications   Medication Sig Dispense Refill    traZODone (DESYREL) 50 mg tablet       dilTIAZem ER (Cardizem CD) 120 mg capsule Take 1 Capsule by mouth nightly. 90 Capsule 1    Ingrezza 40 mg cap       multivitamin (ONE A DAY) tablet Take 1 Tab by mouth daily. 90 Tab 3    folic acid (FOLVITE) 1 mg tablet Take 1 Tab by mouth daily. 90 Tab 3    citalopram (CELEXA) 10 mg tablet Take  by mouth daily.  lamoTRIgine (LAMICTAL) 25 mg tablet Take 75 mg by mouth daily.  gabapentin (Neurontin) 100 mg capsule Take 2 Capsules by mouth three (3) times daily. (Patient not taking: Reported on 1/6/2022) 270 Capsule 1    midodrine (PROAMATINE) 2.5 mg tablet Take one tablet PO with breakfast and 1/2 tablet PO with lunch (Patient not taking: Reported on 1/6/2022) 135 Tab 1    magnesium oxide (MAG-OX) 400 mg tablet Take 1 Tab by mouth two (2) times a day. Dose increased from 1 tab daily to 1 tab bid (Patient not taking: Reported on 1/6/2022) 90 Tab 1    cholecalciferol (VITAMIN D3) (2,000 UNITS /50 MCG) cap capsule Take 2,000 Units by mouth daily. (Patient not taking: Reported on 1/6/2022) 90 Cap 3    miscellaneous medical supply (Blood Pressure Cuff) misc Use daily to check BP (Patient not taking: Reported on 1/6/2022) 1 Each 0    thiamine HCL (VITAMIN B-1) 100 mg tablet Take 1 Tab by mouth daily. 90 Tab 3    benztropine (COGENTIN) 0.5 mg tablet Take 0.5 mg by mouth nightly.  (Patient not taking: Reported on 1/6/2022)       Allergies   Allergen Reactions    Chocolate [Cocoa] Hives    Peanut Other (comments)       Objective:  Visit Vitals  /88 (BP 1 Location: Right arm, BP Patient Position: Semi fowlers, BP Cuff Size: Adult)   Pulse (!) 126   Temp 98.4 °F (36.9 °C) (Oral)   Resp 18   Ht 5' 6\" (1.676 m)   Wt 112 lb (50.8 kg)   SpO2 99%   BMI 18.08 kg/m²     Physical Exam:   AA&O x3. Not pale, not in any distress. HEENT: ENT negative. Neck: Supple, no JVD or lymphadenopathy. Lungs: clear  Heart: S1 S2 +, RRR  Abdomen: Soft, No tenderness  Neuro: No focal deficits. Skin: No erythema or lesions noted. Extremities: no pedal edema, good peripheral pulses  Psych: Normal affect and mood. Results for orders placed or performed during the hospital encounter of 05/04/21   CBC WITH AUTOMATED DIFF   Result Value Ref Range    WBC 3.3 (L) 3.6 - 11.0 K/uL    RBC 4.06 3.80 - 5.20 M/uL    HGB 12.9 11.5 - 16.0 g/dL    HCT 38.4 35.0 - 47.0 %    MCV 94.6 80.0 - 99.0 FL    MCH 31.8 26.0 - 34.0 PG    MCHC 33.6 30.0 - 36.5 g/dL    RDW 12.8 11.5 - 14.5 %    PLATELET 590 (L) 535 - 400 K/uL    MPV 8.5 (L) 8.9 - 12.9 FL    NRBC 0.0 0  WBC    ABSOLUTE NRBC 0.00 0.00 - 0.01 K/uL    NEUTROPHILS 68 32 - 75 %    LYMPHOCYTES 20 12 - 49 %    MONOCYTES 11 5 - 13 %    EOSINOPHILS 1 0 - 7 %    BASOPHILS 0 0 - 1 %    IMMATURE GRANULOCYTES 0 0.0 - 0.5 %    ABS. NEUTROPHILS 2.2 1.8 - 8.0 K/UL    ABS. LYMPHOCYTES 0.7 (L) 0.8 - 3.5 K/UL    ABS. MONOCYTES 0.4 0.0 - 1.0 K/UL    ABS. EOSINOPHILS 0.0 0.0 - 0.4 K/UL    ABS. BASOPHILS 0.0 0.0 - 0.1 K/UL    ABS. IMM.  GRANS. 0.0 0.00 - 0.04 K/UL    DF SMEAR SCANNED      RBC COMMENTS NORMOCYTIC, NORMOCHROMIC     METABOLIC PANEL, COMPREHENSIVE   Result Value Ref Range    Sodium 139 136 - 145 mmol/L    Potassium 3.8 3.5 - 5.1 mmol/L    Chloride 105 97 - 108 mmol/L    CO2 26 21 - 32 mmol/L    Anion gap 8 5 - 15 mmol/L    Glucose 101 (H) 65 - 100 mg/dL    BUN 8 6 - 20 MG/DL    Creatinine 0.53 (L) 0.55 - 1.02 MG/DL    BUN/Creatinine ratio 15 12 - 20      GFR est AA >60 >60 ml/min/1.73m2    GFR est non-AA >60 >60 ml/min/1.73m2    Calcium 9.5 8.5 - 10.1 MG/DL    Bilirubin, total 0.8 0.2 - 1.0 MG/DL    ALT (SGPT) 86 (H) 12 - 78 U/L    AST (SGOT) 103 (H) 15 - 37 U/L    Alk. phosphatase 250 (H) 45 - 117 U/L    Protein, total 8.2 6.4 - 8.2 g/dL    Albumin 4.1 3.5 - 5.0 g/dL    Globulin 4.1 (H) 2.0 - 4.0 g/dL    A-G Ratio 1.0 (L) 1.1 - 2.2     MAGNESIUM   Result Value Ref Range    Magnesium 1.6 1.6 - 2.4 mg/dL   ETHYL ALCOHOL   Result Value Ref Range    ALCOHOL(ETHYL),SERUM <10 <10 MG/DL         Delores Pacheco MD, FACP, [facility].   [address] [address], [place], [place].

## 2022-01-06 NOTE — PROGRESS NOTES
Chief Complaint   Patient presents with   [address]     two falls; 12/22 and 1/4/2022; stitches for first fall, bruising and pain for second fall; may have been other falls in last 12 months also     1. Have you been to the ER, urgent care clinic since your last visit? Hospitalized since your last visit? Yes Reason for visit: Fall    2. Have you seen or consulted any other health care providers outside of the [address] [place] [address] since your last visit? Include any pap smears or colon screening.  Yes Reason for visit: ED

## 2022-02-09 ENCOUNTER — APPOINTMENT (OUTPATIENT)
Dept: CT IMAGING | Age: 62
End: 2022-02-09
Attending: STUDENT IN AN ORGANIZED HEALTH CARE EDUCATION/TRAINING PROGRAM
Payer: MEDICAID

## 2022-02-09 ENCOUNTER — HOSPITAL ENCOUNTER (EMERGENCY)
Age: 62
Discharge: HOME | End: 2022-02-09
Attending: STUDENT IN AN ORGANIZED HEALTH CARE EDUCATION/TRAINING PROGRAM
Payer: MEDICAID

## 2022-02-09 VITALS
RESPIRATION RATE: 16 BRPM | DIASTOLIC BLOOD PRESSURE: 82 MMHG | OXYGEN SATURATION: 99 % | SYSTOLIC BLOOD PRESSURE: 133 MMHG | HEART RATE: 81 BPM | TEMPERATURE: 98.3 F

## 2022-02-09 DIAGNOSIS — R41.0 EPISODE OF CONFUSION: Primary | ICD-10-CM

## 2022-02-09 DIAGNOSIS — Z91.14 NOT TAKING MEDICATION AS DIRECTED: ICD-10-CM

## 2022-02-09 DIAGNOSIS — G24.01 TARDIVE DYSKINESIA: ICD-10-CM

## 2022-02-09 LAB
ALBUMIN SERPL-MCNC: 3.5 G/DL (ref 3.5–5)
ALBUMIN/GLOB SERPL: 0.8 {RATIO} (ref 1.1–2.2)
ALP SERPL-CCNC: 127 U/L (ref 45–117)
ALT SERPL-CCNC: 31 U/L (ref 12–78)
ANION GAP SERPL CALC-SCNC: 7 MMOL/L (ref 5–15)
AST SERPL-CCNC: 32 U/L (ref 15–37)
BASOPHILS # BLD: 0 K/UL (ref 0–0.1)
BASOPHILS NFR BLD: 1 % (ref 0–1)
BILIRUB SERPL-MCNC: 0.4 MG/DL (ref 0.2–1)
BUN SERPL-MCNC: 9 MG/DL (ref 6–20)
BUN/CREAT SERPL: 17 (ref 12–20)
CALCIUM SERPL-MCNC: 9.1 MG/DL (ref 8.5–10.1)
CHLORIDE SERPL-SCNC: 110 MMOL/L (ref 97–108)
CO2 SERPL-SCNC: 23 MMOL/L (ref 21–32)
CREAT SERPL-MCNC: 0.54 MG/DL (ref 0.55–1.02)
DIFFERENTIAL METHOD BLD: NORMAL
EOSINOPHIL # BLD: 0.1 K/UL (ref 0–0.4)
EOSINOPHIL NFR BLD: 1 % (ref 0–7)
ERYTHROCYTE [DISTWIDTH] IN BLOOD BY AUTOMATED COUNT: 12 % (ref 11.5–14.5)
GLOBULIN SER CALC-MCNC: 4.3 G/DL (ref 2–4)
GLUCOSE SERPL-MCNC: 90 MG/DL (ref 65–100)
HCT VFR BLD AUTO: 42.5 % (ref 35–47)
HGB BLD-MCNC: 14 G/DL (ref 11.5–16)
IMM GRANULOCYTES # BLD AUTO: 0 K/UL (ref 0–0.04)
IMM GRANULOCYTES NFR BLD AUTO: 0 % (ref 0–0.5)
LYMPHOCYTES # BLD: 2.7 K/UL (ref 0.8–3.5)
LYMPHOCYTES NFR BLD: 43 % (ref 12–49)
MCH RBC QN AUTO: 32.3 PG (ref 26–34)
MCHC RBC AUTO-ENTMCNC: 32.9 G/DL (ref 30–36.5)
MCV RBC AUTO: 97.9 FL (ref 80–99)
MONOCYTES # BLD: 0.5 K/UL (ref 0–1)
MONOCYTES NFR BLD: 7 % (ref 5–13)
NEUTS SEG # BLD: 3.1 K/UL (ref 1.8–8)
NEUTS SEG NFR BLD: 48 % (ref 32–75)
NRBC # BLD: 0 K/UL (ref 0–0.01)
NRBC BLD-RTO: 0 PER 100 WBC
PLATELET # BLD AUTO: 248 K/UL (ref 150–400)
PMV BLD AUTO: 9.4 FL (ref 8.9–12.9)
POTASSIUM SERPL-SCNC: 4.2 MMOL/L (ref 3.5–5.1)
PROT SERPL-MCNC: 7.8 G/DL (ref 6.4–8.2)
RBC # BLD AUTO: 4.34 M/UL (ref 3.8–5.2)
SODIUM SERPL-SCNC: 140 MMOL/L (ref 136–145)
TROPONIN-HIGH SENSITIVITY: 6 NG/L (ref 0–51)
WBC # BLD AUTO: 6.4 K/UL (ref 3.6–11)

## 2022-02-09 PROCEDURE — 93005 ELECTROCARDIOGRAM TRACING: CPT

## 2022-02-09 PROCEDURE — 74011250636 HC RX REV CODE- 250/636: Performed by: STUDENT IN AN ORGANIZED HEALTH CARE EDUCATION/TRAINING PROGRAM

## 2022-02-09 PROCEDURE — 85025 COMPLETE CBC W/AUTO DIFF WBC: CPT

## 2022-02-09 PROCEDURE — 70450 CT HEAD/BRAIN W/O DYE: CPT

## 2022-02-09 PROCEDURE — 84484 ASSAY OF TROPONIN QUANT: CPT

## 2022-02-09 PROCEDURE — 96374 THER/PROPH/DIAG INJ IV PUSH: CPT

## 2022-02-09 PROCEDURE — 74011000258 HC RX REV CODE- 258: Performed by: STUDENT IN AN ORGANIZED HEALTH CARE EDUCATION/TRAINING PROGRAM

## 2022-02-09 PROCEDURE — 99284 EMERGENCY DEPT VISIT MOD MDM: CPT

## 2022-02-09 PROCEDURE — 80053 COMPREHEN METABOLIC PANEL: CPT

## 2022-02-09 RX ADMIN — THIAMINE HYDROCHLORIDE 100 MG: 100 INJECTION, SOLUTION INTRAMUSCULAR; INTRAVENOUS at 19:09

## 2022-02-09 NOTE — ED PROVIDER NOTES
The history is provided by the patient. Altered mental status   This is a new problem. Episode onset: unknown, at least past 3 days, per report. The problem has not changed since onset. Associated symptoms include confusion and agitation. Mental status baseline is mild dementia. Risk factors: h/o ETOH abuse. Past Medical History:   Diagnosis Date    Akathisia     Bipolar 1 disorder [facility]     D[facility] thrombosis) ([facility]     ETOH abuse     Hypertension     Shingles        Past Surgical History:   Procedure Laterality Date    HX ORTHOPAEDIC      Broken hip R/Neck surgery. Family History:   Problem Relation Age of Onset    Diabetes Mother        Social History     Socioeconomic History    Marital status: SINGLE     Spouse name: Not on file    Number of children: Not on file    Years of education: Not on file    Highest education level: Not on file   Occupational History    Not on file   Tobacco Use    Smoking status: Never Smoker    Smokeless tobacco: Never Used   Vaping Use    Vaping Use: Never used   Substance and Sexual Activity    Alcohol use: Yes     Alcohol/week: 2.0 standard drinks     Types: 2 Glasses of wine per week    Drug use: No    Sexual activity: Not Currently   Other Topics Concern    Not on file   Social History Narrative    Not on file     Social Determinants of Health     Financial Resource Strain:     Difficulty of Paying Living Expenses: Not on file   Food Insecurity:     Worried About Running Out of Food in the Last Year: Not on file    Roshni of Food in the Last Year: Not on file   Transportation Needs:     Lack of Transportation (Medical): Not on file    Lack of Transportation (Non-Medical):  Not on file   Physical Activity:     Days of Exercise per Week: Not on file    Minutes of Exercise per Session: Not on file   Stress:     Feeling of Stress : Not on file   Social Connections:     Frequency of Communication with Friends and Family: Not on file    Frequency of Social Gatherings with Friends and Family: Not on file    Attends Advent Services: Not on file    Active Member of Clubs or Organizations: Not on file    Attends Club or Organization Meetings: Not on file    Marital Status: Not on file   Intimate Partner Violence:     Fear of Current or Ex-Partner: Not on file    Emotionally Abused: Not on file    Physically Abused: Not on file    Sexually Abused: Not on file   Housing Stability:     Unable to Pay for Housing in the Last Year: Not on file    Number of [place] in the Last Year: Not on file    Unstable Housing in the Last Year: Not on file         ALLERGIES: Chocolate [cocoa] and Peanut    Review of Systems   Unable to perform ROS: Mental status change   Psychiatric/Behavioral: Positive for agitation and confusion. Vitals:    02/09/22 1705   BP: 123/76   Pulse: 88   Resp: 18   Temp: 98 °F (36.7 °C)   SpO2: 96%            Physical Exam  Vitals and nursing note reviewed. Constitutional:       General: She is not in acute distress. Appearance: She is well-developed. Comments: +disheveled. HENT:      Head: Normocephalic and atraumatic. Eyes:      Conjunctiva/sclera: Conjunctivae normal.   Cardiovascular:      Rate and Rhythm: Normal rate and regular rhythm. Pulmonary:      Effort: Pulmonary effort is normal. No respiratory distress. Abdominal:      Palpations: Abdomen is soft. Tenderness: There is no abdominal tenderness. There is no guarding. Musculoskeletal:         General: Normal range of motion. Cervical back: Normal range of motion and neck supple. Skin:     General: Skin is warm and dry. Neurological:      Mental Status: She is alert. She is confused. Cranial Nerves: No facial asymmetry. Motor: No abnormal muscle tone. Comments: ? Mild dysarthria with repetitive speech. Psychiatric:         Mood and Affect: Affect is labile.          Speech: Speech is tangential. Behavior: Behavior is agitated. MDM       Procedures    EKG interpretation: 17:47  Rhythm: normal sinus rhythm; and regular . Rate (approx.): 94; Axis: normal; Intervals: normal ; ST/T wave: normal; EKG documented and interpreted by Mike Quinteros MD, ED MD.      7:30 PM  The patient is resting comfortably and feels better, is alert, talkative, interactive and in no distress. The repeat examination is unremarkable and benign. The patient is neurologically intact, has a normal mental status and is ambulatory in the ED. The history, exam, diagnostic testing (if any) and the patient's current condition do not suggest pathologic dysrhythmias, stroke, sepsis, ACS, severe anemia, dehydration, or severe electrolyte abnormality or other significant pathology that would warrant further testing, continued ED treatment, admission, neurological consultation, or other specialist evaluation at this point. The vital signs have been stable. The patient's condition is stable and appropriate for discharge. The patient will pursue further outpatient evaluation with the primary care physician or other designated or consulting physician as indicated in the discharge instructions.

## 2022-02-09 NOTE — ED TRIAGE NOTES
Over the last 2 weeks there is a reported decline in patient. Incontinent of urine. Stuttering, aphasia and not taking her medication. Patient repetitive \"I have had a stroke. \"       - Dexter Donna, [phone] ([address] [facility]).

## 2022-02-10 LAB
ATRIAL RATE: 94 BPM
CALCULATED P AXIS, ECG09: 73 DEGREES
CALCULATED R AXIS, ECG10: 56 DEGREES
CALCULATED T AXIS, ECG11: 66 DEGREES
DIAGNOSIS, 93000: NORMAL
P-R INTERVAL, ECG05: 168 MS
Q-T INTERVAL, ECG07: 352 MS
QRS DURATION, ECG06: 82 MS
QTC CALCULATION (BEZET), ECG08: 440 MS
VENTRICULAR RATE, ECG03: 94 BPM

## 2022-02-10 NOTE — ED NOTES
MD and RN have reviewed and gave discharge instructions to the patient. The patient verbalized understanding. The pt left ambulatory with a cab.

## 2022-02-10 NOTE — ED NOTES
Verbal shift change report given to Annika Rincon  (oncoming nurse) by Sanjana Cruz RN  (offgoing nurse). Report included the following information SBAR, Kardex and ED Summary.

## 2022-03-10 ENCOUNTER — OFFICE VISIT (OUTPATIENT)
Dept: INTERNAL MEDICINE CLINIC | Age: 62
End: 2022-03-10
Payer: MEDICAID

## 2022-03-10 VITALS
DIASTOLIC BLOOD PRESSURE: 87 MMHG | TEMPERATURE: 99.5 F | HEART RATE: 128 BPM | RESPIRATION RATE: 18 BRPM | BODY MASS INDEX: 18.08 KG/M2 | HEIGHT: 66 IN | SYSTOLIC BLOOD PRESSURE: 121 MMHG | OXYGEN SATURATION: 98 %

## 2022-03-10 DIAGNOSIS — Z11.59 ENCOUNTER FOR HEPATITIS C SCREENING TEST FOR LOW RISK PATIENT: ICD-10-CM

## 2022-03-10 DIAGNOSIS — Z13.1 ENCOUNTER FOR SCREENING FOR DIABETES MELLITUS: ICD-10-CM

## 2022-03-10 DIAGNOSIS — Z00.00 HEALTHCARE MAINTENANCE: ICD-10-CM

## 2022-03-10 DIAGNOSIS — I95.1 ORTHOSTATIC HYPOTENSION: ICD-10-CM

## 2022-03-10 DIAGNOSIS — Z13.220 ENCOUNTER FOR LIPID SCREENING FOR CARDIOVASCULAR DISEASE: ICD-10-CM

## 2022-03-10 DIAGNOSIS — F10.10 ALCOHOL ABUSE: ICD-10-CM

## 2022-03-10 DIAGNOSIS — F10.11 HISTORY OF ALCOHOL ABUSE: ICD-10-CM

## 2022-03-10 DIAGNOSIS — F31.31 BIPOLAR AFFECTIVE DISORDER, CURRENTLY DEPRESSED, MILD (HCC): ICD-10-CM

## 2022-03-10 DIAGNOSIS — G24.01 TARDIVE DYSKINESIA: ICD-10-CM

## 2022-03-10 DIAGNOSIS — Z11.4 SCREENING FOR HIV (HUMAN IMMUNODEFICIENCY VIRUS): ICD-10-CM

## 2022-03-10 DIAGNOSIS — R29.6 FREQUENT FALLS: Primary | ICD-10-CM

## 2022-03-10 DIAGNOSIS — Z13.6 ENCOUNTER FOR LIPID SCREENING FOR CARDIOVASCULAR DISEASE: ICD-10-CM

## 2022-03-10 PROCEDURE — 99214 OFFICE O/P EST MOD 30 MIN: CPT | Performed by: INTERNAL MEDICINE

## 2022-03-10 NOTE — PATIENT INSTRUCTIONS
Preventing Falls: Care Instructions  Your Care Instructions     Getting around your home safely can be a challenge if you have injuries or health problems that make it easy for you to fall. Loose rugs and furniture in walkways are among the dangers for many older people who have problems walking or who have poor eyesight. People who have conditions such as arthritis, osteoporosis, or dementia also have to be careful not to fall. You can make your home safer with a few simple measures. Follow-up care is a key part of your treatment and safety. Be sure to make and go to all appointments, and call your doctor if you are having problems. It's also a good idea to know your test results and keep a list of the medicines you take. How can you care for yourself at home? Taking care of yourself  · Exercise regularly to improve your strength, muscle tone, and balance. Walk if you can. Swimming may be a good choice if you cannot walk easily. · Have your vision and hearing checked each year or any time you notice a change. If you have trouble seeing and hearing, you might not be able to avoid objects and could lose your balance. · Know the side effects of the medicines you take. Ask your doctor or pharmacist whether the medicines you take can affect your balance. Sleeping pills or sedatives can affect your balance. · Limit the amount of alcohol you drink. Alcohol can impair your balance and other senses. · Ask your doctor whether calluses or corns on your feet need to be removed. If you wear loose-fitting shoes because of calluses or corns, you can lose your balance and fall. · Talk to your doctor if you have numbness in your feet. · You may get dizzy if you do not drink enough water. To prevent dehydration, drink plenty of fluids. Choose water and other clear liquids.  If you have kidney, heart, or liver disease and have to limit fluids, talk with your doctor before you increase the amount of fluids you drink.  Preventing falls at home  · Remove raised doorway thresholds, throw rugs, and clutter. Repair loose carpet or raised areas in the floor. · Move furniture and electrical cords to keep them out of walking paths. · Use nonskid floor wax, and wipe up spills right away, especially on ceramic tile floors. · If you use a walker or cane, put rubber tips on it. If you use crutches, clean the bottoms of them regularly with an abrasive pad, such as steel wool. · Keep your house well lit, especially [address], and outside walkways. Use night-lights in areas such as hallways and bathrooms. Add extra light switches or use remote switches (such as switches that go on or off when you clap your hands) to make it easier to turn lights on if you have to get up during the night. · Install sturdy handrails on stairways. · Move items in your cabinets so that the things you use a lot are on the lower shelves (about waist level). · Keep a cordless phone and a flashlight with new batteries by your bed. If possible, put a phone in each of the main rooms of your house, or carry a cell phone in case you fall and cannot reach a phone. Or, you can wear a device around your neck or wrist. You push a button that sends a signal for help. · Wear low-heeled shoes that fit well and give your feet good support. Use footwear with nonskid soles. Check the heels and soles of your shoes for wear. Repair or replace worn heels or soles. · Do not wear socks without shoes on wood floors. · Walk on the grass when the sidewalks are slippery. If you live in an area that gets snow and ice in the winter, sprinkle salt on slippery steps and sidewalks. Or ask a family member or friend to do this for you. Preventing falls in the bath  · Install grab bars and nonskid mats inside and outside your shower or tub and near the toilet and sinks. · Use shower chairs and bath benches.   · Use a hand-held shower head that will allow you to sit while showering. · Get into a tub or shower by putting the weaker leg in first. Get out of a tub or shower with your strong side first.  · Repair loose toilet seats and consider installing a raised toilet seat to make getting on and off the toilet easier. · Keep your bathroom door unlocked while you are in the shower. Where can you learn more? Go to http://www.cortez.com/  Enter G117 in the search box to learn more about \"Preventing Falls: Care Instructions. \"  Current as of: September 8, 2021               Content Version: 13.2  © 8026-9924 Healthwise, [employer]. Care instructions adapted under license by [employer] (which disclaims liability or warranty for this information). If you have questions about a medical condition or this instruction, always ask your healthcare professional. [address] any warranty or liability for your use of this information.

## 2022-03-10 NOTE — PROGRESS NOTES
Office Visit Note:    Assessment/Plan:  1. Frequent falls    2. Orthostatic hypotension    3. Tardive dyskinesia    4. Alcohol abuse    5. Bipolar affective disorder, currently depressed, mild ([address]    6. Healthcare maintenance    7. Encounter for screening for diabetes mellitus    8. Encounter for lipid screening for cardiovascular disease    9. Encounter for hepatitis C screening test for low risk patient    10. Screening for HIV (human immunodeficiency virus)    11. History of alcohol abuse        1. Frequent falls-she still continues to have falls, 2 falls since last visit at least one of them I think is from orthostatic hypotension. Advised to stay off her blood pressure pills. Follows up with cardiology. Advised to not do anything that she has to need for blood on use balance. Offered to refer physical therapy but she states that she does not think it will help her. 2. Orthostatic hypotension-she used to follow-up with cardiology, on midodrine. Will advise her to keep following up with cardiology  3. Tardive dyskinesia-she is on Ingrezza for TD, also on Cogentin. She now has developed some stuttering and some burning in her forehead, has an appointment to see neurology. 4. Hydrocephalus-not a good surgical candidate, was following up with neurosurgery and neurology. 5. History of alcohol abuse-she states that she has not drank alcohol since December. 6. SVT-on Cardizem  7.  Bipolar disorder-on Celexa, trazodone, follows up with psychiatry      Health Maintenance:  Immunizations:  Tetanus: Received tetanus shot in the last year  Shingles: Shingles Vaccine ordered  Influenza: Received this season  Covid: Received 2 doses of Madonna vaccine      Screening:  Colon cancer: Declined  Mammogram: Declined  Cervical Cancer: States that she had a Pap smear in the last 3 years, declined further testing  Hepatitis C: Ordered today  HIV: Ordered today  Osteoporosis : Will need DEXA scan when she is done 64  Diabetes: A1c ordered today    She is a DNR    Orders Placed This Encounter    LIPID PANEL     Standing Status:   Future     Number of Occurrences:   1     Standing Expiration Date:   3/10/2023    HIV 1/2 AG/AB, 4TH GENERATION,W RFLX CONFIRM     Standing Status:   Future     Number of Occurrences:   1     Standing Expiration Date:   3/10/2023    HEMOGLOBIN A1C WITH EAG     Standing Status:   Future     Number of Occurrences:   1     Standing Expiration Date:   3/10/2023    HCV AB W/RFLX TO KEITH     Standing Status:   Future     Number of Occurrences:   1     Standing Expiration Date:   3/10/2023    VITAMIN D, 25 HYDROXY     Standing Status:   Future     Number of Occurrences:   1     Standing Expiration Date:   3/10/2023     Social Determinants of Health     Tobacco Use: Low Risk     Smoking Tobacco Use: Never Smoker    Smokeless Tobacco Use: Never Used   Alcohol Use:     Frequency of Alcohol Consumption: Not on file    Average Number of Drinks: Not on file    Frequency of Binge Drinking: Not on file   Financial Resource Strain:     Difficulty of Paying Living Expenses: Not on file   Food Insecurity:     Worried About Running Out of Food in the Last Year: Not on file    Roshni of Food in the Last Year: Not on file   Transportation Needs:     Lack of Transportation (Medical): Not on file    Lack of Transportation (Non-Medical):  Not on file   Physical Activity:     Days of Exercise per Week: Not on file    Minutes of Exercise per Session: Not on file   Stress:     Feeling of Stress : Not on file   Social Connections:     Frequency of Communication with Friends and Family: Not on file    Frequency of Social Gatherings with Friends and Family: Not on file    Attends [employer] Services: Not on file    Active Member of Clubs or Organizations: Not on file    Attends Club or Organization Meetings: Not on file    Marital Status: Not on file   Intimate Partner Violence:     Fear of Current or Ex-Partner: Not on file    Emotionally Abused: Not on file    Physically Abused: Not on file    Sexually Abused: Not on file   Depression:     PHQ-2 Score: Not on file   Housing Stability:     Unable to Pay for Housing in the Last Year: Not on file    Number of Places Lived in the Last Year: Not on file    Unstable Housing in the Last Year: Not on file       Follow-up and Dispositions    · Return in about 3 months (around 6/10/2022). I have reviewed with the patient details of the assessment and plan and all questions were answered. Relevant patient education was performed. The most recent lab findings were reviewed with the patient. An After Visit Summary was printed and given to the patient. Reason for Visit: ED Follow-up (falls/altered mental status)      Subjective:  60 y.o. female with h/o multiple medical problems including frequent falls, orthostatic hypotension, tardive dyskinesia, hydrocephalus, bipolar disorder comes for follow-up. She had 2 falls since her last visit. She states one fall was when she was leaning forward to put a collar on her scooter, the second 1 was after she stood up and tried to move quickly. She states that she feels much better now. She is not having breathing issues, her memory is better. She does complain of some stuttering off and on which is improved. She also complains of some burning in her forehead which has been going on for several weeks to months. She has a history of alcohol abuse in the past but tells me that she has not drank since December  She has a history of orthostatic hypotension and is followed by cardiology  She lives in the living facility alone and does not want home health services or physical therapy.      Review of Systems  A complete 11 system ROS was preformed (constitutional, eyes, ENT, cardiovascular, respiratory, gastrointestinal, genitourinary, musculoskeletal, skin, neurological, psychiatric) and was negative aside from the pertinent positives and negatives noted in the HPI. Past Medical History:   Diagnosis Date    Akathisia     Bipolar 1 disorder ([address]     [facility]     ETOH abuse     Hypertension     Shingles      Past Surgical History:   Procedure Laterality Date    HX ORTHOPAEDIC      Broken hip R/Neck surgery. Social History     Socioeconomic History    Marital status: SINGLE   Tobacco Use    Smoking status: Never Smoker    Smokeless tobacco: Never Used   Vaping Use    Vaping Use: Never used   Substance and Sexual Activity    Alcohol use: Yes     Alcohol/week: 2.0 standard drinks     Types: 2 Glasses of wine per week    Drug use: No    Sexual activity: Not Currently     Family History   Problem Relation Age of Onset    Diabetes Mother      Current Outpatient Medications   Medication Sig Dispense Refill    traZODone (DESYREL) 50 mg tablet       dilTIAZem ER (Cardizem CD) 120 mg capsule Take 1 Capsule by mouth nightly. 90 Capsule 1    Ingrezza 40 mg cap       folic acid (FOLVITE) 1 mg tablet Take 1 Tab by mouth daily. 90 Tab 3    thiamine HCL (VITAMIN B-1) 100 mg tablet Take 1 Tab by mouth daily. 90 Tab 3    citalopram (CELEXA) 10 mg tablet Take  by mouth daily.  lamoTRIgine (LAMICTAL) 25 mg tablet Take 75 mg by mouth daily.  gabapentin (Neurontin) 100 mg capsule Take 2 Capsules by mouth three (3) times daily. (Patient not taking: Reported on 1/6/2022) 270 Capsule 1    midodrine (PROAMATINE) 2.5 mg tablet Take one tablet PO with breakfast and 1/2 tablet PO with lunch (Patient not taking: Reported on 1/6/2022) 135 Tab 1    multivitamin (ONE A DAY) tablet Take 1 Tab by mouth daily. (Patient not taking: Reported on 3/10/2022) 90 Tab 3    magnesium oxide (MAG-OX) 400 mg tablet Take 1 Tab by mouth two (2) times a day.  Dose increased from 1 tab daily to 1 tab bid (Patient not taking: Reported on 1/6/2022) 90 Tab 1    cholecalciferol (VITAMIN D3) (2,000 UNITS /50 MCG) cap capsule Take 2,000 Units by mouth daily. (Patient not taking: Reported on 1/6/2022) 90 Cap 3    miscellaneous medical supply (Blood Pressure Cuff) misc Use daily to check BP (Patient not taking: Reported on 1/6/2022) 1 Each 0    benztropine (COGENTIN) 0.5 mg tablet Take 0.5 mg by mouth nightly. (Patient not taking: Reported on 1/6/2022)       Allergies   Allergen Reactions    Chocolate [Cocoa] Hives    Peanut Other (comments)       Objective:  Visit Vitals  /87 (BP 1 Location: Left upper arm, BP Patient Position: Sitting, BP Cuff Size: Adult)   Pulse (!) 128   Temp 99.5 °F (37.5 °C) (Oral)   Resp 18   Ht 5' 6\" (1.676 m)   SpO2 98%   BMI 18.08 kg/m²     Physical Exam:   AA&O x3. Not pale, not in any distress. Using wheelchair  HEENT: ENT negative. Neck: Supple  Lungs: clear  Heart: S1 S2 +, RRR  Abdomen: Soft, No tenderness  Skin: No erythema or lesions noted. Extremities: no pedal edema  Psych: Normal affect and mood. Results for orders placed or performed during the hospital encounter of 02/09/22   CBC WITH AUTOMATED DIFF   Result Value Ref Range    WBC 6.4 3.6 - 11.0 K/uL    RBC 4.34 3.80 - 5.20 M/uL    HGB 14.0 11.5 - 16.0 g/dL    HCT 42.5 35.0 - 47.0 %    MCV 97.9 80.0 - 99.0 FL    MCH 32.3 26.0 - 34.0 PG    MCHC 32.9 30.0 - 36.5 g/dL    RDW 12.0 11.5 - 14.5 %    PLATELET 023 708 - 336 K/uL    MPV 9.4 8.9 - 12.9 FL    NRBC 0.0 0  WBC    ABSOLUTE NRBC 0.00 0.00 - 0.01 K/uL    NEUTROPHILS 48 32 - 75 %    LYMPHOCYTES 43 12 - 49 %    MONOCYTES 7 5 - 13 %    EOSINOPHILS 1 0 - 7 %    BASOPHILS 1 0 - 1 %    IMMATURE GRANULOCYTES 0 0.0 - 0.5 %    ABS. NEUTROPHILS 3.1 1.8 - 8.0 K/UL    ABS. LYMPHOCYTES 2.7 0.8 - 3.5 K/UL    ABS. MONOCYTES 0.5 0.0 - 1.0 K/UL    ABS. EOSINOPHILS 0.1 0.0 - 0.4 K/UL    ABS. BASOPHILS 0.0 0.0 - 0.1 K/UL    ABS. IMM.  GRANS. 0.0 0.00 - 0.04 K/UL    DF AUTOMATED     METABOLIC PANEL, COMPREHENSIVE   Result Value Ref Range    Sodium 140 136 - 145 mmol/L    Potassium 4.2 3.5 - 5.1 mmol/L    Chloride 110 (H) 97 - 108 mmol/L    CO2 23 21 - 32 mmol/L    Anion gap 7 5 - 15 mmol/L    Glucose 90 65 - 100 mg/dL    BUN 9 6 - 20 MG/DL    Creatinine 0.54 (L) 0.55 - 1.02 MG/DL    BUN/Creatinine ratio 17 12 - 20      GFR est AA >60 >60 ml/min/1.73m2    GFR est non-AA >60 >60 ml/min/1.73m2    Calcium 9.1 8.5 - 10.1 MG/DL    Bilirubin, total 0.4 0.2 - 1.0 MG/DL    ALT (SGPT) 31 12 - 78 U/L    AST (SGOT) 32 15 - 37 U/L    Alk. phosphatase 127 (H) 45 - 117 U/L    Protein, total 7.8 6.4 - 8.2 g/dL    Albumin 3.5 3.5 - 5.0 g/dL    Globulin 4.3 (H) 2.0 - 4.0 g/dL    A-G Ratio 0.8 (L) 1.1 - 2.2     TROPONIN-HIGH SENSITIVITY   Result Value Ref Range    Troponin-High Sensitivity 6 0 - 51 ng/L   EKG, 12 LEAD, INITIAL   Result Value Ref Range    Ventricular Rate 94 BPM    Atrial Rate 94 BPM    P-R Interval 168 ms    QRS Duration 82 ms    Q-T Interval 352 ms    QTC Calculation (Bezet) 440 ms    Calculated P Axis 73 degrees    Calculated R Axis 56 degrees    Calculated T Axis 66 degrees    Diagnosis       Normal sinus rhythm  Normal ECG  When compared with ECG of 28-JAN-2021 06:30,  No significant change was found  Confirmed by Reynaldo Ramos MD, Lisandra Ochoa ([id number]) on 2/10/2022 7:20:18 PM           Merlin Ruggiero MD, FACP, [facility].   [address] [id number], [place], [place].

## 2022-03-10 NOTE — PROGRESS NOTES
Chief Complaint   Patient presents with   [facility] ED Follow-up     falls/altered mental status     1. Have you been to the ER, urgent care clinic since your last visit? Hospitalized since your last visit? Yes Reason for visit: falls    2. Have you seen or consulted any other health care providers outside of the [address] [place] [address] since your last visit? Include any pap smears or colon screening.  Yes Reason for visit: UC

## 2022-03-11 LAB
25(OH)D3 SERPL-MCNC: 36.2 NG/ML (ref 30–100)
CHOLEST SERPL-MCNC: 238 MG/DL
EST. AVERAGE GLUCOSE BLD GHB EST-MCNC: 117 MG/DL
HBA1C MFR BLD: 5.7 % (ref 4–5.6)
HDLC SERPL-MCNC: 72 MG/DL
HDLC SERPL: 3.3 {RATIO} (ref 0–5)
HIV 1+2 AB+HIV1 P24 AG SERPL QL IA: NONREACTIVE
HIV12 RESULT COMMENT, HHIVC: NORMAL
LDLC SERPL CALC-MCNC: 132.6 MG/DL (ref 0–100)
TRIGL SERPL-MCNC: 167 MG/DL (ref ?–150)
VLDLC SERPL CALC-MCNC: 33.4 MG/DL

## 2022-03-12 LAB
HCV AB S/CO SERPL IA: <0.1 S/CO RATIO (ref 0–0.9)
HCV AB SERPL QL IA: NORMAL

## 2022-03-18 PROBLEM — F10.939 ALCOHOL WITHDRAWAL (HCC): Status: ACTIVE | Noted: 2021-01-27

## 2022-03-18 PROBLEM — I47.10 SVT (SUPRAVENTRICULAR TACHYCARDIA): Status: ACTIVE | Noted: 2022-01-06

## 2022-03-18 PROBLEM — Z91.81 AT HIGH RISK FOR FALLS: Status: ACTIVE | Noted: 2022-01-06

## 2022-03-18 PROBLEM — I47.1 SVT (SUPRAVENTRICULAR TACHYCARDIA) (HCC): Status: ACTIVE | Noted: 2022-01-06

## 2022-03-18 PROBLEM — Z91.81 AT HIGH RISK FOR INJURY RELATED TO FALL: Status: ACTIVE | Noted: 2020-10-01

## 2022-03-18 PROBLEM — I95.1 ORTHOSTATIC HYPOTENSION: Status: ACTIVE | Noted: 2022-01-06

## 2022-03-19 PROBLEM — F10.10 ALCOHOL ABUSE: Status: ACTIVE | Noted: 2022-01-06

## 2022-03-19 PROBLEM — G24.01 TARDIVE DYSKINESIA: Status: ACTIVE | Noted: 2022-01-06

## 2022-03-19 PROBLEM — E78.5 HYPERLIPIDEMIA: Status: ACTIVE | Noted: 2019-09-03

## 2022-03-19 PROBLEM — F10.11 HISTORY OF ALCOHOL ABUSE: Status: ACTIVE | Noted: 2019-04-23

## 2022-03-19 PROBLEM — R29.6 FREQUENT FALLS: Status: ACTIVE | Noted: 2022-01-06

## 2022-03-19 PROBLEM — R56.9 SEIZURE (HCC): Status: ACTIVE | Noted: 2021-01-27

## 2022-03-19 PROBLEM — G91.3 POST-TRAUMATIC HYDROCEPHALUS (HCC): Status: ACTIVE | Noted: 2022-01-06

## 2022-03-19 PROBLEM — Z87.81 HISTORY OF FRACTURE OF RIGHT HIP: Status: ACTIVE | Noted: 2019-04-23

## 2022-03-19 PROBLEM — F31.9 BIPOLAR 1 DISORDER (HCC): Status: ACTIVE | Noted: 2019-04-23

## 2022-03-19 PROBLEM — E55.9 VITAMIN D DEFICIENCY: Status: ACTIVE | Noted: 2019-09-03

## 2022-03-19 PROBLEM — G25.71 AKATHISIA: Status: ACTIVE | Noted: 2019-04-23

## 2022-03-19 PROBLEM — Z86.718 HISTORY OF DVT (DEEP VEIN THROMBOSIS): Status: ACTIVE | Noted: 2019-04-23

## 2022-03-19 PROBLEM — R74.8 ELEVATED ALKALINE PHOSPHATASE LEVEL: Status: ACTIVE | Noted: 2019-09-03

## 2022-03-20 PROBLEM — F31.31 BIPOLAR AFFECTIVE DISORDER, CURRENTLY DEPRESSED, MILD (HCC): Status: ACTIVE | Noted: 2022-01-06

## 2022-03-20 PROBLEM — G25.2 RESTING TREMOR: Status: ACTIVE | Noted: 2019-04-23

## 2022-06-07 ENCOUNTER — PATIENT MESSAGE (OUTPATIENT)
Dept: INTERNAL MEDICINE CLINIC | Age: 62
End: 2022-06-07

## 2022-06-16 ENCOUNTER — OFFICE VISIT (OUTPATIENT)
Dept: INTERNAL MEDICINE CLINIC | Age: 62
End: 2022-06-16
Payer: MEDICAID

## 2022-06-16 VITALS
TEMPERATURE: 97.8 F | BODY MASS INDEX: 17.52 KG/M2 | SYSTOLIC BLOOD PRESSURE: 100 MMHG | HEIGHT: 66 IN | RESPIRATION RATE: 16 BRPM | HEART RATE: 98 BPM | WEIGHT: 109 LBS | DIASTOLIC BLOOD PRESSURE: 80 MMHG | OXYGEN SATURATION: 99 %

## 2022-06-16 DIAGNOSIS — Z00.00 HEALTHCARE MAINTENANCE: ICD-10-CM

## 2022-06-16 DIAGNOSIS — I95.1 ORTHOSTATIC HYPOTENSION: ICD-10-CM

## 2022-06-16 DIAGNOSIS — F31.31 BIPOLAR AFFECTIVE DISORDER, CURRENTLY DEPRESSED, MILD (HCC): ICD-10-CM

## 2022-06-16 DIAGNOSIS — G24.01 TARDIVE DYSKINESIA: ICD-10-CM

## 2022-06-16 DIAGNOSIS — R29.6 FREQUENT FALLS: Primary | ICD-10-CM

## 2022-06-16 DIAGNOSIS — F10.10 ALCOHOL ABUSE: ICD-10-CM

## 2022-06-16 PROCEDURE — 99214 OFFICE O/P EST MOD 30 MIN: CPT | Performed by: INTERNAL MEDICINE

## 2022-06-16 RX ORDER — LACTOSE-REDUCED FOOD
237 LIQUID (ML) ORAL 4 TIMES DAILY
Qty: 50 EACH | Refills: 3 | Status: SHIPPED | OUTPATIENT
Start: 2022-06-16

## 2022-06-16 NOTE — PROGRESS NOTES
1. Have you been to the ER, urgent care clinic since your last visit? Hospitalized since your last visit?no    2. Have you seen or consulted any other health care providers outside of the [address] [place] [address] since your last visit? Include any pap smears or colon screening.  No    Chief Complaint   Patient presents with    Hypertension    Mental Health Problem     3 most recent PHQ Screens 6/16/2022   Little interest or pleasure in doing things Not at all   Feeling down, depressed, irritable, or hopeless Not at all   Total Score PHQ 2 0

## 2022-06-16 NOTE — PROGRESS NOTES
Office Visit Note:    Assessment/Plan:  1. Frequent falls    2. Orthostatic hypotension    3. Tardive dyskinesia    4. Bipolar affective disorder, currently depressed, mild ([address]    5. Alcohol abuse    6. Healthcare maintenance      1. Frequent falls-she still continues to have some falls, 2 falls in the last 1 month. Her falls are multifactorial secondary to her longstanding alcohol use, orthostatic hypotension, tardive dyskinesia. She would benefit from physical therapy and she has resisted that in the past, today she is agreeable to be referred to physical therapy. She also wants to try to get motorized scooter for herself, will ask physical therapy to make evaluation on it  2. Orthostatic hypotension-on midodrine, advised to follow-up with cardiology. 3. Tardive dyskinesia-she is on Ingrezza for TD, also on Cogentin. She has now developed some stuttering, has an appointment to see neurology tomorrow. 4. Hydrocephalus-not a good surgical candidate, was following up with neurosurgery and neurology. 5. History of alcohol abuse-she states that she has not drank hard alcohol since December. 6. SVT-on Cardizem  7. Bipolar disorder-on Celexa, trazodone, follows up with psychiatry  8. Prediabetes-A1c of 5.7      Health Maintenance:  Immunizations:  Tetanus: Received tetanus shot in the last year  Shingles: Shingles Vaccine ordered  Influenza: Received this season  Covid: Received 2 doses of Madonna vaccine      Screening:  Colon cancer: Declined  Mammogram: Declined  Cervical Cancer: States that she had a Pap smear in the last 3 years, declined further testing  I advised her that if she does not get to mammogram, colonoscopy or Pap smear she could get cancer and could die from it and she is willing to take the risk.   Hepatitis C: Negative  HIV: Negative  Osteoporosis : Will need DEXA scan when she is done 71  She states that she used to take Ensure in the past, has poor appetite, will  for her  She is a DNR    Orders Placed This Encounter    REFERRAL TO PHYSICAL THERAPY     Referral Priority:   Routine     Referral Type:   PT/OT/ST     Referral Reason:   Specialty Services Required     Number of Visits Requested:   1    food supplemt, lactose-reduced (Ensure) liqd     Sig: Take 237 mL by mouth four (4) times daily. Dispense:  50 Each     Refill:  3     Strawberry flavor     Social Determinants of Health     Tobacco Use: Low Risk     Smoking Tobacco Use: Never Smoker    Smokeless Tobacco Use: Never Used   Alcohol Use:     Frequency of Alcohol Consumption: Not on file    Average Number of Drinks: Not on file    Frequency of Binge Drinking: Not on file   Financial Resource Strain:     Difficulty of Paying Living Expenses: Not on file   Food Insecurity:     Worried About Running Out of Food in the Last Year: Not on file    Roshni of Food in the Last Year: Not on file   Transportation Needs:     Lack of Transportation (Medical): Not on file    Lack of Transportation (Non-Medical):  Not on file   Physical Activity:     Days of Exercise per Week: Not on file    Minutes of Exercise per Session: Not on file   Stress:     Feeling of Stress : Not on file   Social Connections:     Frequency of Communication with Friends and Family: Not on file    Frequency of Social Gatherings with Friends and Family: Not on file    Attends Mos[employer] Services: Not on file    Active Member of [address] [place] [address] or Organizations: Not on file    Attends Club or Organization Meetings: Not on file    Marital Status: Not on file   Intimate Partner Violence:     Fear of Current or Ex-Partner: Not on file    Emotionally Abused: Not on file    Physically Abused: Not on file    Sexually Abused: Not on file   Depression: Not at risk    PHQ-2 Score: 0   Housing Stability:     Unable to Pay for Housing in the Last Year: Not on file    Number of [place] in the Last Year: Not on file    Unstable Housing in the Last Year: Not on file       Follow-up and Dispositions    · Return in about 3 months (around 2022). I have reviewed with the patient details of the assessment and plan and all questions were answered. Relevant patient education was performed. The most recent lab findings were reviewed with the patient. An After Visit Summary was printed and given to the patient. Reason for Visit: Hypertension and Mental Health Problem      Subjective:  60 y.o. female with h/o multiple medical problems including frequent falls, orthostatic hypotension, tardive dyskinesia, hydrocephalus, bipolar disorder comes for follow-up. She is here with her  who is transporting her  She still continues to have couple of falls. She does not feel the frequency of her falls have changed. She apparently uses a scooter which she received from somebody who passed away. She is requesting for us to order one for her because the battery on it has . She is starting more today, she has an appointment to see neurology tomorrow. She states that she has not had any hard liquor since 2021 but last had some beers 3 months ago  She lives alone and does not want to go to a facility. Review of Systems  A complete 11 system ROS was preformed (constitutional, eyes, ENT, cardiovascular, respiratory, gastrointestinal, genitourinary, musculoskeletal, skin, neurological, psychiatric) and was negative aside from the pertinent positives and negatives noted in the HPI. Past Medical History:   Diagnosis Date    Akathisia     Bipolar 1 disorder ([address]     [facility]     ETOH abuse     Hypertension     Shingles      Past Surgical History:   Procedure Laterality Date    HX ORTHOPAEDIC      Broken hip R/Neck surgery.       Social History     Socioeconomic History    Marital status: SINGLE   Tobacco Use    Smoking status: Never Smoker    Smokeless tobacco: Never Used   Vaping Use    Vaping Use: Never used Substance and Sexual Activity    Alcohol use: Yes     Alcohol/week: 2.0 standard drinks     Types: 2 Glasses of wine per week    Drug use: No    Sexual activity: Not Currently     Family History   Problem Relation Age of Onset    Diabetes Mother      Current Outpatient Medications   Medication Sig Dispense Refill    food supplemt, lactose-reduced (Ensure) liqd Take 237 mL by mouth four (4) times daily. 50 Each 3    traZODone (DESYREL) 50 mg tablet       dilTIAZem ER (Cardizem CD) 120 mg capsule Take 1 Capsule by mouth nightly. 90 Capsule 1    gabapentin (Neurontin) 100 mg capsule Take 2 Capsules by mouth three (3) times daily. (Patient not taking: Reported on 1/6/2022) 270 Capsule 1    Ingrezza 40 mg cap       midodrine (PROAMATINE) 2.5 mg tablet Take one tablet PO with breakfast and 1/2 tablet PO with lunch (Patient not taking: Reported on 1/6/2022) 135 Tab 1    multivitamin (ONE A DAY) tablet Take 1 Tab by mouth daily. (Patient not taking: Reported on 3/10/2022) 90 Tab 3    magnesium oxide (MAG-OX) 400 mg tablet Take 1 Tab by mouth two (2) times a day. Dose increased from 1 tab daily to 1 tab bid (Patient not taking: Reported on 1/6/2022) 90 Tab 1    folic acid (FOLVITE) 1 mg tablet Take 1 Tab by mouth daily. 90 Tab 3    cholecalciferol (VITAMIN D3) (2,000 UNITS /50 MCG) cap capsule Take 2,000 Units by mouth daily. (Patient not taking: Reported on 1/6/2022) 90 Cap 3    miscellaneous medical supply (Blood Pressure Cuff) misc Use daily to check BP (Patient not taking: Reported on 1/6/2022) 1 Each 0    thiamine HCL (VITAMIN B-1) 100 mg tablet Take 1 Tab by mouth daily. 90 Tab 3    citalopram (CELEXA) 10 mg tablet Take  by mouth daily.  benztropine (COGENTIN) 0.5 mg tablet Take 0.5 mg by mouth nightly. (Patient not taking: Reported on 1/6/2022)      lamoTRIgine (LAMICTAL) 25 mg tablet Take 75 mg by mouth daily.        Allergies   Allergen Reactions    Chocolate [Cocoa] Hives    Peanut Other (comments)       Objective:  Visit Vitals  /80   Pulse 98   Temp 97.8 °F (36.6 °C) (Oral)   Resp 16   Ht 5' 6\" (1.676 m)   Wt 109 lb (49.4 kg)   LMP  (Exact Date)   SpO2 99%   BMI 17.59 kg/m²     Physical Exam:   AA&O x3. Not pale, not in any distress. Using wheelchair  HEENT: ENT negative. Neck: Supple  Lungs: clear  Heart: S1 S2 +, RRR  Abdomen: Soft, No tenderness  Skin: No erythema or lesions noted. Extremities: no pedal edema  Psych: Normal affect and mood. Results for orders placed or performed in visit on 03/10/22   LIPID PANEL   Result Value Ref Range    Cholesterol, total 238 (H) <200 MG/DL    Triglyceride 167 (H) <150 MG/DL    HDL Cholesterol 72 MG/DL    LDL, calculated 132.6 (H) 0 - 100 MG/DL    VLDL, calculated 33.4 MG/DL    CHOL/HDL Ratio 3.3 0.0 - 5.0     VITAMIN D, 25 HYDROXY   Result Value Ref Range    Vitamin D 25-Hydroxy 36.2 30 - 100 ng/mL   HIV 1/2 AG/AB, 4TH GENERATION,W RFLX CONFIRM   Result Value Ref Range    HIV 1/2 Interpretation NONREACTIVE NONREACTIVE      HIV 1/2 result comment SEE NOTE     HCV AB W/RFLX TO KEITH   Result Value Ref Range    HCV Ab <0.1 0.0 - 0.9 s/co ratio   HEMOGLOBIN A1C WITH EAG   Result Value Ref Range    Hemoglobin A1c 5.7 (H) 4.0 - 5.6 %    Est. average glucose 117 mg/dL   HCV INTERPRETATION   Result Value Ref Range    HCV Interpretation Comment           Olga Lidia Zarate MD, FACP, [facility].   [address], [place], [address] [facility]

## 2022-06-17 ENCOUNTER — OFFICE VISIT (OUTPATIENT)
Dept: NEUROLOGY | Age: 62
End: 2022-06-17
Payer: MEDICAID

## 2022-06-17 VITALS
SYSTOLIC BLOOD PRESSURE: 145 MMHG | HEIGHT: 65 IN | DIASTOLIC BLOOD PRESSURE: 87 MMHG | HEART RATE: 95 BPM | WEIGHT: 110 LBS | BODY MASS INDEX: 18.33 KG/M2 | TEMPERATURE: 98.7 F | OXYGEN SATURATION: 99 %

## 2022-06-17 DIAGNOSIS — R29.2 HYPERREFLEXIA: ICD-10-CM

## 2022-06-17 DIAGNOSIS — R26.9 GAIT DIFFICULTY: Primary | ICD-10-CM

## 2022-06-17 DIAGNOSIS — F41.9 ANXIETY: ICD-10-CM

## 2022-06-17 PROCEDURE — 99215 OFFICE O/P EST HI 40 MIN: CPT | Performed by: PSYCHIATRY & NEUROLOGY

## 2022-06-17 RX ORDER — LORAZEPAM 1 MG/1
TABLET ORAL
Qty: 2 TABLET | Refills: 0 | Status: SHIPPED | OUTPATIENT
Start: 2022-06-17

## 2022-06-17 RX ORDER — MIDODRINE HYDROCHLORIDE 2.5 MG/1
2.5 TABLET ORAL 2 TIMES DAILY
Qty: 180 TABLET | Refills: 1 | Status: SHIPPED | OUTPATIENT
Start: 2022-06-17

## 2022-06-17 NOTE — PROGRESS NOTES
Neurology Consult Note      HISTORY PROVIDED BY: patient and     Chief Complaint:   Chief Complaint   Patient presents with    Follow-up     stuttering since February 2022    Memory Loss      Subjective:    Lashanda Tao is a 60 y.o. female initially and last seen on 1/28/21 while hospitalized. Pt has h/o bipolar d/o, alcohol abuse, HTN, admitted 1/27/21 with reported witnessed seizure activity while on a zoom meeting with a healthcare provider who called EMS. Pt provides conflicting histories between providers. According to chart notes, pt reported multiple possible seizures in last few weeks, but denied this to me. She also denied being on a zoom meeting stating she has no idea how to do a video visit, and suggested she might have just been on a phone call. She denies prior h/o seizure, no family h/o seizure, does report multiple head injuries in the past.  Exam revealed a disheveled appearing patient, alert, oriented x 4, left ptosis - chronic, left 4th and 5th fingers with contractures, non-focal exam. CTH was negative. EEG was normal. ETOH <10. Possible new onset seizure or alcohol w/d seizure. MRI brain w/wo contrast 1/29/21 reviewed with neuroradiology, primary issue is hydrocephalus, not mentioned in impressions.  The signal in the mesial temporal region is slightly increased on right compared to left, both have volume loss, but global atrophy is seen. This is not mesial temporal sclerosis. There is no indication that pt had epilepsy and no ASD was started. She returns for f/u. Since her hospitalization, she was seen by Lila Martínez NP in the office 2/4/21 and referred to Kena at [facility] and underwent evaluation for NPH and possible  shunt placement. Her  provides all of the history. It was felt that she was not a candidate for  shunt due to \"complexity of her situation\" and not clear after CSF drain trial that her gait improved.   This past winter, she was having frequent falls, unable to get up, episodes of incontinence, stuttering, increased confusion, issues with memory. [facility] not clear what happened there. Was evaluated for a stroke and that was neg. These sxs have improved. Still confused at times, still having intermittent stuttering. She is still living alone, has said she would allow someone to come in to her home to take care of some things, such as mopping floors, helping with shopping, meals, but this has not happened yet. She reports poor PO intake. She states she only has a few beers, but CM indicates that this is not entirely true. She states she can remember the spells, states she feels faint, has to be careful when she gets up, counts to ten before she starts walking, feels lightheaded before she falls. She currently has SOB, attributed to anxiety, has seen cardiology and work up still ongoing. SW indicates a h/o orthostatic hypotension. In past saw Dr. Emely Quevedo and was on an antihypertensive med, now stopped. In review of EMR, she was on midodrine at one point, but is no longer taking this. The patient is not on most medications listed in her chart. Past Medical History:   Diagnosis Date    Akathisia     Bipolar 1 disorder ([facility] [address]     [facility] venous thrombosis) ([facility])     ETOH abuse     Hypertension     Shingles       Past Surgical History:   Procedure Laterality Date    HX ORTHOPAEDIC      Broken hip R/Neck surgery. Social History     Socioeconomic History    Marital status: SINGLE     Spouse name: Not on file    Number of children: Not on file    Years of education: Not on file    Highest education level: Not on file   Occupational History    Not on file   Tobacco Use    Smoking status: Never Smoker    Smokeless tobacco: Never Used   Vaping Use    Vaping Use: Never used   Substance and Sexual Activity    Alcohol use:  Yes     Alcohol/week: 2.0 standard drinks     Types: 2 Glasses of wine per week    Drug use: No    Sexual activity: Not Currently   Other Topics Concern    Not on file   Social History Narrative    Not on file     Social Determinants of Health     Financial Resource Strain:     Difficulty of Paying Living Expenses: Not on file   Food Insecurity:     Worried About Running Out of Food in the Last Year: Not on file    Roshni of Food in the Last Year: Not on file   Transportation Needs:     Lack of Transportation (Medical): Not on file    Lack of Transportation (Non-Medical): Not on file   Physical Activity:     Days of Exercise per Week: Not on file    Minutes of Exercise per Session: Not on file   Stress:     Feeling of Stress : Not on file   Social Connections:     Frequency of Communication with Friends and Family: Not on file    Frequency of Social Gatherings with Friends and Family: Not on file    Attends [employer] Services: Not on file    Active Member of [address] [place] [address] [employer] or Organizations: Not on file    Attends Club or Organization Meetings: Not on file    Marital Status: Not on file   Intimate Partner Violence:     Fear of Current or Ex-Partner: Not on file    Emotionally Abused: Not on file    Physically Abused: Not on file    Sexually Abused: Not on file   Housing Stability:     Unable to Pay for Housing in the Last Year: Not on file    Number of [place] in the Last Year: Not on file    Unstable Housing in the Last Year: Not on file     Family History   Problem Relation Age of Onset    Diabetes Mother          Objective:   ROS Per HPI    Allergies   Allergen Reactions    Chocolate [Cocoa] Hives    Peanut Other (comments)        Meds:  Outpatient Medications Prior to Visit   Medication Sig Dispense Refill    food supplemt, lactose-reduced (Ensure) liqd Take 237 mL by mouth four (4) times daily. 50 Each 3    traZODone (DESYREL) 50 mg tablet       citalopram (CELEXA) 10 mg tablet Take  by mouth daily.  lamoTRIgine (LAMICTAL) 25 mg tablet Take 75 mg by mouth daily.       dilTIAZem ER (Cardizem CD) 120 mg capsule Take 1 Capsule by mouth nightly. 90 Capsule 1    gabapentin (Neurontin) 100 mg capsule Take 2 Capsules by mouth three (3) times daily. (Patient not taking: Reported on 1/6/2022) 270 Capsule 1    Ingrezza 40 mg cap  (Patient not taking: Reported on 6/17/2022)      midodrine (PROAMATINE) 2.5 mg tablet Take one tablet PO with breakfast and 1/2 tablet PO with lunch (Patient not taking: Reported on 1/6/2022) 135 Tab 1    multivitamin (ONE A DAY) tablet Take 1 Tab by mouth daily. (Patient not taking: Reported on 3/10/2022) 90 Tab 3    magnesium oxide (MAG-OX) 400 mg tablet Take 1 Tab by mouth two (2) times a day. Dose increased from 1 tab daily to 1 tab bid (Patient not taking: Reported on 1/6/2022) 90 Tab 1    folic acid (FOLVITE) 1 mg tablet Take 1 Tab by mouth daily. 90 Tab 3    cholecalciferol (VITAMIN D3) (2,000 UNITS /50 MCG) cap capsule Take 2,000 Units by mouth daily. (Patient not taking: Reported on 1/6/2022) 90 Cap 3    miscellaneous medical supply (Blood Pressure Cuff) misc Use daily to check BP (Patient not taking: Reported on 1/6/2022) 1 Each 0    thiamine HCL (VITAMIN B-1) 100 mg tablet Take 1 Tab by mouth daily. 90 Tab 3    benztropine (COGENTIN) 0.5 mg tablet Take 0.5 mg by mouth nightly. (Patient not taking: Reported on 1/6/2022)       No facility-administered medications prior to visit. Imaging:  MRI Results (most recent):  Results from Hospital Encounter encounter on 01/27/21    MRI BRAIN W WO CONT    Narrative  Clinical history: Pt with new onset sz, has h/o head injury and was told 10  years ago she needed a  shunt for hydrocephalus  INDICATION:   Pt with new onset sz, has h/o head injury and was told 10 years  ago she needed a  shunt for hydrocephalus    COMPARISON: 1/27/2021  TECHNIQUE: MR examination of the brain includes axial and sagittal T1 , axial  T2, axial FLAIR, axial gradient echo, axial DWI, coronal T1 .  Pre and post  contrast axial T1-weighted imaging. Postcontrast T1-weighted imaging coronal  plane. Temporal protocol was utilized  CONTRAST: The patient was administered gadolinium-based contrast material,  subsequently axial and sagittal T1-weighted postcontrast imaging was obtained. 10 mL Dotarem    FINDINGS:  There is no intracranial mass, hemorrhage or acute infarction. There is bilateral mesial temporal sclerosis. Diminished overall hippocampal  volume. Thinning of the hippocampi is greater on the left than on the right. Increased hippocampal signal on the right more so than the left. Right temporal  horn is larger than the left temporal horn. There is no heterotopia. . There is  no temporal mass lesion. Confluent periventricular and scattered foci of increased T2 signal intensity in  the corona radiata and centrum semiovale. Sulcal and ventricular prominence. There is no abnormal parenchymal enhancement. There is no abnormal meningeal  enhancement demonstrated. The brain architecture is normal. There is no evidence  of midline shift or mass-effect. Major intracranial vascular flow-voids are unremarkable. The orbits are grossly  symmetric. Dural venous sinuses are grossly unremarkable. There is no Chiari or  syrinx. Pituitary and infundibulum grossly unremarkable. Cerebellopontine angles  are unremarkable. No skull base mass is identified. Cavernous sinuses are  symmetric. The mastoid air cells and are well pneumatized and clear. Impression  Bilateral mesial temporal/hippocampal signal abnormalities with diminished  hippocampal volumes disproportionate to overall degree of cerebral volume loss. Hippocampal volume loss is greater on the left than on the right with  hippocampal increased signal greater on the right than on the left. Moderate to severe cerebral atrophy for patient age. Mild chronic microvascular ischemic change. No intracranial mass, hemorrhage or evidence of acute infarction.      CT Results (most recent):  Results from Hospital Encounter encounter on 02/09/22    CT HEAD WO CONT    Narrative  INDICATION: AMS    EXAM: CT HEAD without contrast.    COMPARISON: [id number]. TECHNIQUE: Unenhanced CT Head is performed. CT dose reduction was achieved  through use of a standardized protocol tailored for this examination and  automatic exposure control for dose modulation. FINDINGS: Brain parenchyma shows no CT apparent ischemia. There is no apparent  mass on unenhanced imaging. There is no bleed, shift, obstructive hydrocephalus  or significant extra-axial fluid collection. Mild ventriculomegaly is stable. Bone windows are unremarkable. Impression  No acute intracranial finding. Reviewed records in [facility] tab today    Lab Review   Results for orders placed or performed in visit on 03/10/22   LIPID PANEL   Result Value Ref Range    Cholesterol, total 238 (H) <200 MG/DL    Triglyceride 167 (H) <150 MG/DL    HDL Cholesterol 72 MG/DL    LDL, calculated 132.6 (H) 0 - 100 MG/DL    VLDL, calculated 33.4 MG/DL    CHOL/HDL Ratio 3.3 0.0 - 5.0     VITAMIN D, 25 HYDROXY   Result Value Ref Range    Vitamin D 25-Hydroxy 36.2 30 - 100 ng/mL   HIV 1/2 AG/AB, 4TH GENERATION,W RFLX CONFIRM   Result Value Ref Range    HIV 1/2 Interpretation NONREACTIVE NONREACTIVE      HIV 1/2 result comment SEE NOTE     HCV AB W/RFLX TO KEITH   Result Value Ref Range    HCV Ab <0.1 0.0 - 0.9 s/co ratio   HEMOGLOBIN A1C WITH EAG   Result Value Ref Range    Hemoglobin A1c 5.7 (H) 4.0 - 5.6 %    Est. average glucose 117 mg/dL   HCV INTERPRETATION   Result Value Ref Range    HCV Interpretation Comment          Exam:  Visit Vitals  BP (!) 145/87   Pulse 95   Temp 98.7 °F (37.1 °C) (Oral)   Ht 5' 5\" (1.651 m)   Wt 110 lb (49.9 kg)   SpO2 99%   BMI 18.30 kg/m²     General:  Alert, cooperative, no distress. Head:  Normocephalic, without obvious abnormality, atraumatic.    Respiratory:  Heart:   Non labored breathing  Regular rate and rhythm, no murmurs   Neck:   2+ carotids, no bruits   Extremities: Warm, no cyanosis or edema. Pulses: 2+ radial pulses. Neurologic:  MS: Alert and oriented to date, month, year, day, place, speech intact. Language -able to name, repeat, follow commands. Attention and fund of knowledge appropriate. Recent and remote memory impaired to details of her history. Cranial Nerves:  II: visual fields Full to confrontation   II: pupils Equal, round, reactive to light   II: optic disc    III,VII: ptosis none   III,IV,VI: extraocular muscles  EOMI, no nystagmus or diplopia   V: facial light touch sensation  normal   VII: facial muscle function   symmetric   VIII: hearing intact   IX: soft palate elevation     XI: trapezius strength     XI: sternocleidomastoid strength    XII: tongue       Motor: normal bulk and tone, facial dyskinesias, Strength: 5/5 throughout, no PD  Sensory: intact to LT, PP, intact vibratory sensation  Coordination: FTN and HTS intact, ANGEL intact  Gait: Pt using rolling seated walker  Reflexes: 3+ symmetric in UE, knees, absent at ankles, toes mute           Assessment/Plan   Pt is a 60 y.o. female with h/o bipolar d/o, alcohol abuse, HTN, admitted 1/27/21 with reported witnessed seizure activity while on a zoom meeting with a healthcare provider, known h/o multiple head injuries in the past, found on MRI to have severe global atrophy and ventriculomegaly with negative NPH work up. New c/o beginning this winter of frequent falls, unable to get up, episodes of incontinence, stuttering, increased confusion, issues with memory, [facility] and no stroke found, but records not available for review. Pt reporting LH with standing and prior to falls with known h/o orthostatic hypotension. Sxs have improved except stuttering and periodic confusion.  Exam - disheveled appearing patient, alert, and fully oriented, language intact, but poor memory for details of her medical history, left ptosis - chronic, left 4th and 5th fingers with contractures, facial dyskinesias, 3+ reflexes except absent at ankles, toes mute. Discussed extensive differential for these falls and other symptoms. They may have been related to acute alcohol intake, alcohol withdrawal seizures, metabolic disturbance, orthostatic hypotension, probable baseline dementia given MRI findings and difficulty with her memory and any kind of secondary issue causing worsening of her cognitive function. Additionally, she has brisk reflexes except at her ankles, likely has a peripheral neuropathy potentially related to her alcohol intake and/or nutritional deficiencies, so brisk reflexes could indicate a cervical myelopathy contributing to her gait disturbance versus a metabolic myelopathy again from nutritional deficiencies. MRI of the cervical spine ordered as this could . Discussed NCS/EMG, but we were all in agreement that this likely would not change how she is managed. Ordered a B12/MMA. Will restart her midodrine 2.5 mg twice daily and she will monitor to see if lightheadedness improves. Unfortunately at the current time there is no good way to monitor her supine or sitting blood pressure, but she may have an aide in the near future that can help with this. Follow-up is made for next available, 6 months, instructed to call in the interim with any questions or concerns. Instructed to call after MRI completed if they do not hear from our office within 1 week. ICD-10-CM ICD-9-CM    1. Gait difficulty  R26.9 781.2 MRI CERV SPINE WO CONT   2. Hyperreflexia  R29.2 796.1 MRI CERV SPINE WO CONT      VITAMIN B12      METHYLMALONIC ACID   3. Anxiety  F41.9 300.00 LORazepam (ATIVAN) 1 mg tablet     I spent a total of 59 minutes in both face-to-face activities and non-face-to-face activities for the visit on the date of this encounter. Signed:   Sharon Jimenez MD  6/17/2022

## 2022-06-17 NOTE — LETTER
6/17/2022    Patient: John Jerez   YOB: 1960   Date of Visit: 6/17/2022     Shirley Batres MD  [address] [place] [address]  Via In Basket    Dear Shirley Batres MD,      Thank you for referring Ms. John Jerez to [facility] for evaluation. My notes for this consultation are attached. If you have questions, please do not hesitate to call me. I look forward to following your patient along with you.       Sincerely,    Barron Oppenheim, MD

## 2022-06-24 LAB
METHYLMALONATE SERPL-SCNC: 99 NMOL/L (ref 0–378)
VIT B12 SERPL-MCNC: 337 PG/ML (ref 232–1245)

## 2022-07-06 NOTE — PROGRESS NOTES
[facility] - Please call pt/: Labs 6/17/22 - B12/MMA are normal. It doesn't look like she has scheduled her MRI C-spine, please remind her to do so.

## 2022-07-07 ENCOUNTER — TELEPHONE (OUTPATIENT)
Dept: NEUROLOGY | Age: 62
End: 2022-07-07

## 2022-07-07 NOTE — TELEPHONE ENCOUNTER
----- Message from Calderon Dill MD sent at 7/6/2022  3:45 PM EDT -----  [employer] - Please call pt/: Labs 6/17/22 - B12/MMA are normal. It doesn't look like she has scheduled her MRI C-spine, please remind her to do so.

## 2022-07-07 NOTE — TELEPHONE ENCOUNTER
Called and lvm for [facility]. Per Dr Volodymyr Duran, \"Labs 6/17/22 - B12/MMA are normal. It doesn't look like she has scheduled her MRI C-spine, please remind her to do so. \"

## 2022-07-28 ENCOUNTER — APPOINTMENT (OUTPATIENT)
Dept: PHYSICAL THERAPY | Age: 62
End: 2022-07-28

## 2022-08-12 ENCOUNTER — HOSPITAL ENCOUNTER (OUTPATIENT)
Dept: PHYSICAL THERAPY | Age: 62
Discharge: HOME | End: 2022-08-12
Payer: MEDICAID

## 2022-08-12 PROCEDURE — 97162 PT EVAL MOD COMPLEX 30 MIN: CPT | Performed by: PHYSICAL THERAPIST

## 2022-08-12 NOTE — PROGRESS NOTES
PT INITIAL EVALUATION NOTE 2-15    Patient Name: Jefferson Ferraro  Date:2022  : 1960  [x]  Patient  Verified  Payor: Torie Yung / Plan: [facility] / Product Type: Managed Care Medicaid /    In time:915A  Out time:1025A  Total Treatment Time (min): 70  Visit #: 1     Treatment Area: Repeated falls [R29.6]  Orthostatic hypotension [I95.1]    SUBJECTIVE  Pain Level (0-10 scale): 4/10  Any medication changes, allergies to medications, adverse drug reactions, diagnosis change, or new procedure performed?: [] No    [x] Yes (see summary sheet for update)  Subjective: The pt is a 60 y.o. female referred for gait and balance. She presents ambulating with a rollator walker, with her  present. The pt reports that she is not able to get out into the community at this point. She gets easily winded and has to walk with the rollator walker, but prefers a motorized scooter due to safety. The reports that she has been using a rollator for several years. Hx of right Hip Fx that she continues to be limited with. The pt reports that she has a hard time getting dressed, and just lays in bed all day. All she does is get up to go to the bathroom. Doesn't eat much because she cant  get to the grocery store. Has been eating potato chips all week. The pt reports that she can use the microwave.  mentions that she has been approved for an in home aide, but has not been assigned one yet. The pt reports that if she does get up she falls, now she is fearful and has been very careful. Not able to do stairs at all. Lives in 1 floor apartment. PLOF: sedentary  Mechanism of Injury: gradual  Previous Treatment/Compliance: none  PMHx/Surgical Hx: hydrocephalus, depression, Anxiety, Bi polar, ETOH abuse, HTN, orthostatic hypotension, OA, right hip ORIF.    Work Hx: none  Living Situation: apartment 1st floor  Pt Goals: get a scooter to be able to get out of her home and be more active  Barriers: anxiety, co-morbilities, limited tolerance for activity   Motivation: good  Substance use: alcohol    Cognition: A & O x 4        OBJECTIVE/EXAMINATION  Posture:  pt is very thin and has forward flexed trunk and is looking down (pt has cervical pain with hx of cervical fracture that and will be getting MRI next week)  Other Observations:  rollator walker is missing a screw and is unsafe.  has been approved to get a new walker for her and will get this before the weekend  Gait and Functional Mobility:  shuffling gait, very rapid quick steps    ALEX Knee AROM wnl       Balance: PT tolerated with CGA and use of rollator walker standing for 45 seconds. Limited with NBOA nad needed min assist for correction. TU seconds with poor safety returning to her chair, needed CGA and verbal cues to guide. Sit to stand: use of ALEX UE, pushes up on rollator without cueing.      Flexibility: unable to assess during initial session       LOWER QUARTER   MUSCLE STRENGTH  KEY       R  L  0 - No Contraction  Knee ext  4-  4  1 - Trace            flex  4-  4+  2 - Poor   Hip ext   -  -  3 - Fair          flex   3-  4  4 - Good         abd  3  4  5 - Normal         add  4  4      Ankle DF  4  4+                PF  3+  4                    MMT: right LE is limited secondary to hx of right hip fracture  Neurological: Reflexes / Sensations: Alex foot numbness reported  Special Tests: Trendelenberg: positive                     With   [] TE   [] TA   [] Neuro   [] SC   [] other: Patient Education: [x] Review HEP    [] Progressed/Changed HEP based on:   [] positioning   [] body mechanics   [] transfers   [] heat/ice application    [] other:        Other Objective/Functional Measures: FOTO Functional Measure: 49 /100                  Pain Level (0-10 scale) post treatment: not reported      ASSESSMENT:      [x]  See Plan of [address], PT, DPT 2022

## 2022-08-15 NOTE — THERAPY EVALUATION
Physical Therapy at [facility],   a part of [facility]  [address] [address], [address] [place] [address], [address] [place] [address]  Phone: [phone]  Fax: [phone]    Plan of Care/Statement of Necessity for Physical Therapy Services  2-15    Patient name: Gage Mendes  : 1960  Provider#: [id number]  Referral source: Ryan Vega MD      Medical/Treatment Diagnosis: Repeated falls [R29.6]  Orthostatic hypotension [I95.1]     Prior Hospitalization: see medical history     Comorbidities: hydrocephalus, depression, Anxiety, Bi polar, ETOH abuse, HTN, orthostatic hypotension, OA, right hip ORIF. Prior Level of Function: rollator walker needed for years  Medications: Verified on Patient Summary List    Start of Care: 21      Onset Date: years       The Plan of Care and following information is based on the information from the initial evaluation. Assessment/ key information: The pt is a 60 y.o. female that was referred with orthostatic hypotension, and gait disturbances. She presents with s/s consistent with referring dx with multiple co-morbilities contributing to dysfunction. She presents with decreased safety with ambulation with her rollator on evaluation, decreased endurance, strength, balance. The pt would benefit from skilled physical therapy in order to address these impairments and to return her to maximal level of function pain free. Evaluation Complexity History HIGH Complexity :3+ comorbidities / personal factors will impact the outcome/ POC ; Examination LOW Complexity : 1-2 Standardized tests and measures addressing body structure, function, activity limitation and / or participation in recreation  ;Presentation HIGH Complexity : Unstable and unpredictable characteristics  ; Clinical Decision Making MEDIUM Complexity : FOTO score of 26-74  Overall Complexity Rating: MEDIUM    Problem List: pain affecting function, decrease ROM, decrease strength, impaired gait/ balance, decrease ADL/ functional abilitiies, decrease activity tolerance, and decrease transfer abilities   Treatment Plan may include any combination of the following: Therapeutic exercise, Therapeutic activities, Neuromuscular re-education, Physical agent/modality, Gait/balance training, Patient education, Self Care training, Functional mobility training, Home safety training, and Stair training  Patient / Family readiness to learn indicated by: asking questions, trying to perform skills, and interest  Persons(s) to be included in education: patient (P)   Barriers to Learning/Limitations: yes;  emotional and physical  Patient Goal (s): less falls, get a scooter  Patient Self Reported Health Status: fair  Rehabilitation Potential: good    Short Term Goals: To be accomplished in 4 weeks:  1)  Independent with HEP  2) Perform  TUG with improved safety SBA in 30 seconds  3) Perform  NBOS for 30 seconds x 3 with CGA  4) Perform  gait training for 200 feet with CGA without sitting rest break      Long Term Goals: To be accomplished in 8 weeks:    1) Balance on firm surface with eyes closed >/= 60 seconds without symptoms   2) Balance on soft surface with eyes open >/= 60 seconds without symptoms  3) Perform TUG in < 25 seconds with good safety. 4) Perform 500 feet gait training with CGA without seated rest break. Frequency / Duration: Patient to be seen 2 times per week for 6-8 weeks. Patient/ Caregiver education and instruction: Exercises, pt education, safety    [x]  Plan of care has been reviewed with NIDA Gallardo PT, DPT 8/15/2022     ________________________________________________________________________    I certify that the above Therapy Services are being furnished while the patient is under my care. I agree with the treatment plan and certify that this therapy is necessary.     Physician's Signature:____________________  Date:____________Time: _________      Janie Batista MD

## 2022-08-18 ENCOUNTER — HOSPITAL ENCOUNTER (OUTPATIENT)
Dept: MRI IMAGING | Age: 62
Discharge: HOME | End: 2022-08-18
Attending: PSYCHIATRY & NEUROLOGY
Payer: MEDICAID

## 2022-08-18 DIAGNOSIS — R29.2 HYPERREFLEXIA: ICD-10-CM

## 2022-08-18 DIAGNOSIS — R26.9 GAIT DIFFICULTY: ICD-10-CM

## 2022-08-18 PROCEDURE — 72141 MRI NECK SPINE W/O DYE: CPT

## 2022-08-22 ENCOUNTER — HOSPITAL ENCOUNTER (OUTPATIENT)
Dept: PHYSICAL THERAPY | Age: 62
End: 2022-08-22
Payer: MEDICAID

## 2022-08-25 ENCOUNTER — APPOINTMENT (OUTPATIENT)
Dept: PHYSICAL THERAPY | Age: 62
End: 2022-08-25
Payer: MEDICAID

## 2022-08-29 ENCOUNTER — APPOINTMENT (OUTPATIENT)
Dept: PHYSICAL THERAPY | Age: 62
End: 2022-08-29
Payer: MEDICAID

## 2022-10-05 ENCOUNTER — TELEPHONE (OUTPATIENT)
Dept: NEUROLOGY | Age: 62
End: 2022-10-05

## 2022-10-05 NOTE — PROGRESS NOTES
Samia - Please call pt/: MRI C-spine 8/19/22 reviewed - multilevel degenerative changes and evidence of her prior surgery, but no cord signal change, no significant central stenosis or cord compression that would explain her symptoms. It is important that she not fall to prevent future injury to her spinal cord.

## 2022-10-10 ENCOUNTER — TELEPHONE (OUTPATIENT)
Dept: NEUROLOGY | Age: 62
End: 2022-10-10

## 2022-10-10 NOTE — TELEPHONE ENCOUNTER
Called the EC twice. No answer. Left a VM stating I gave her a VM on 10/05/22. Informed her on the VM of the patient MRI results stating \"MRI C-spine 8/19/22 reviewed - multilevel degenerative changes and evidence of her prior surgery, but no cord signal change, no significant central stenosis or cord compression that would explain her symptoms. It is important that she not fall to prevent future injury to her spinal cord. \"

## 2023-01-31 ENCOUNTER — TRANSCRIBE ORDER (OUTPATIENT)
Dept: SCHEDULING | Age: 63
End: 2023-01-31

## 2023-01-31 DIAGNOSIS — I49.9 IRREGULAR HEART RHYTHM: ICD-10-CM

## 2023-01-31 DIAGNOSIS — R29.898 WEAKNESS OF BOTH LOWER LIMBS: Primary | ICD-10-CM

## 2023-03-08 ENCOUNTER — APPOINTMENT (OUTPATIENT)
Dept: NON INVASIVE DIAGNOSTICS | Age: 63
End: 2023-03-08
Attending: HOSPITALIST

## 2023-03-08 ENCOUNTER — TRANSCRIBE ORDER (OUTPATIENT)
Dept: SCHEDULING | Age: 63
End: 2023-03-08

## 2023-03-08 ENCOUNTER — HOSPITAL ENCOUNTER (OUTPATIENT)
Dept: MRI IMAGING | Age: 63
Discharge: HOME | End: 2023-03-08
Attending: HOSPITALIST

## 2023-03-08 DIAGNOSIS — R29.898 WEAKNESS OF BOTH LOWER LIMBS: ICD-10-CM

## 2023-03-08 DIAGNOSIS — I49.9 IRREGULAR HEART RHYTHM: Primary | ICD-10-CM

## 2023-03-27 ENCOUNTER — HOSPITAL ENCOUNTER (OUTPATIENT)
Dept: NON INVASIVE DIAGNOSTICS | Age: 63
Discharge: HOME | End: 2023-03-27
Attending: HOSPITALIST
Payer: MEDICARE

## 2023-03-27 VITALS — BODY MASS INDEX: 18 KG/M2 | HEIGHT: 65 IN | WEIGHT: 108.03 LBS

## 2023-03-27 DIAGNOSIS — I49.9 IRREGULAR HEART RHYTHM: ICD-10-CM

## 2023-03-27 LAB
ECHO AO ROOT DIAM: 3.5 CM
ECHO AO ROOT INDEX: 2.3 CM/M2
ECHO AV PEAK GRADIENT: 6 MMHG
ECHO AV PEAK VELOCITY: 1.2 M/S
ECHO AV VELOCITY RATIO: 0.83
ECHO LA DIAMETER INDEX: 2.04 CM/M2
ECHO LA DIAMETER: 3.1 CM
ECHO LA TO AORTIC ROOT RATIO: 0.89
ECHO LA VOL 2C: 24 ML (ref 22–52)
ECHO LA VOL 4C: 14 ML (ref 22–52)
ECHO LA VOL BP: 19 ML (ref 22–52)
ECHO LA VOL/BSA BIPLANE: 13 ML/M2 (ref 16–34)
ECHO LA VOLUME AREA LENGTH: 20 ML
ECHO LA VOLUME INDEX A2C: 16 ML/M2 (ref 16–34)
ECHO LA VOLUME INDEX A4C: 9 ML/M2 (ref 16–34)
ECHO LA VOLUME INDEX AREA LENGTH: 13 ML/M2 (ref 16–34)
ECHO LV E' LATERAL VELOCITY: 8 CM/S
ECHO LV E' SEPTAL VELOCITY: 9 CM/S
ECHO LV FRACTIONAL SHORTENING: 29 % (ref 28–44)
ECHO LV INTERNAL DIMENSION DIASTOLE INDEX: 2.24 CM/M2
ECHO LV INTERNAL DIMENSION DIASTOLIC: 3.4 CM (ref 3.9–5.3)
ECHO LV INTERNAL DIMENSION SYSTOLIC INDEX: 1.58 CM/M2
ECHO LV INTERNAL DIMENSION SYSTOLIC: 2.4 CM
ECHO LV IVSD: 0.7 CM (ref 0.6–0.9)
ECHO LV MASS 2D: 71.9 G (ref 67–162)
ECHO LV MASS INDEX 2D: 47.3 G/M2 (ref 43–95)
ECHO LV POSTERIOR WALL DIASTOLIC: 0.9 CM (ref 0.6–0.9)
ECHO LV RELATIVE WALL THICKNESS RATIO: 0.53
ECHO LVOT PEAK GRADIENT: 4 MMHG
ECHO LVOT PEAK VELOCITY: 1 M/S
ECHO MV A VELOCITY: 0.75 M/S
ECHO MV AREA PHT: 3.4 CM2
ECHO MV E DECELERATION TIME (DT): 222.7 MS
ECHO MV E VELOCITY: 0.49 M/S
ECHO MV E/A RATIO: 0.65
ECHO MV E/E' LATERAL: 6.13
ECHO MV E/E' RATIO (AVERAGED): 5.78
ECHO MV E/E' SEPTAL: 5.44
ECHO MV PRESSURE HALF TIME (PHT): 64.6 MS
ECHO PV MAX VELOCITY: 0.8 M/S
ECHO PV PEAK GRADIENT: 3 MMHG
ECHO RV TAPSE: 1.9 CM (ref 1.7–?)
ECHO TV REGURGITANT MAX VELOCITY: 2.05 M/S
ECHO TV REGURGITANT PEAK GRADIENT: 17 MMHG

## 2023-03-27 PROCEDURE — 93306 TTE W/DOPPLER COMPLETE: CPT

## 2023-04-05 ENCOUNTER — HOSPITAL ENCOUNTER (OUTPATIENT)
Dept: MRI IMAGING | Age: 63
End: 2023-04-05
Attending: HOSPITALIST
Payer: MEDICARE

## 2023-04-05 PROCEDURE — 72141 MRI NECK SPINE W/O DYE: CPT

## 2023-04-22 DIAGNOSIS — R29.898 WEAKNESS OF BOTH LOWER LIMBS: Primary | ICD-10-CM

## 2023-05-09 ENCOUNTER — TELEPHONE (OUTPATIENT)
Age: 63
End: 2023-05-09

## 2023-05-10 NOTE — TELEPHONE ENCOUNTER
Not certain why this was sent to Dr. Maeve Quezada. This request was sent to me by the [facility]. I have rejected the case and asked that they send the death certificate to her PCP.

## 2023-05-15 ENCOUNTER — TELEPHONE (OUTPATIENT)
Age: 63
End: 2023-05-15

## 2023-05-15 NOTE — TELEPHONE ENCOUNTER
Received call from [facility] again pertaining to death certificate. They are aware that the doctor rejected the care on the [facility] site. [employer] stated they they would like to reach out again for a signature. Stated they attempted to contact Dr. Kayla Ortega since this doctor saw her on June 16th, 2022 but can not find him at the old practice. Now attempting to reach back here.      Cristóbal Stubbs: [phone]

## 2023-05-16 NOTE — TELEPHONE ENCOUNTER
Called Kelsey Echavarria from [facility]. Informed her that the doctor has declined to sign the certificate. Informed her that he was last seen by Saman Sifuentes NP. Given number to Cristina Sahni. States she will give them a call.

## 2023-05-16 NOTE — TELEPHONE ENCOUNTER
Lizett - Please call them back. As I indicated when I declined to sign the certificate. The pt's PCP is Vasile Chambers  [facility], [address] [facility] [address]    [phone] (Work)    [phone] (Fax)    He was just seen by this provider on 4/17/23.

## 2023-05-23 RX ORDER — MIDODRINE HYDROCHLORIDE 2.5 MG/1
2.5 TABLET ORAL 2 TIMES DAILY
COMMUNITY
Start: 2022-06-17

## 2023-05-23 RX ORDER — CITALOPRAM 10 MG/1
TABLET ORAL DAILY
COMMUNITY

## 2023-05-23 RX ORDER — LORAZEPAM 1 MG/1
TABLET ORAL
COMMUNITY
Start: 2022-06-17

## 2023-05-23 RX ORDER — TRAZODONE HYDROCHLORIDE 50 MG/1
TABLET ORAL
COMMUNITY
Start: 2022-01-03

## 2023-05-23 RX ORDER — VALBENAZINE 40 MG/1
CAPSULE ORAL
COMMUNITY
Start: 2021-02-16

## 2023-05-23 RX ORDER — LAMOTRIGINE 25 MG/1
75 TABLET ORAL DAILY
COMMUNITY

## 2025-01-14 NOTE — PROGRESS NOTES
Identified pt with two pt identifiers(name and ). Reviewed record in preparation for visit and have obtained necessary documentation. Chief Complaint   Patient presents with    Hypertension    Other     review CT results        Health Maintenance Due   Topic    Hepatitis C Screening     Pneumococcal 0-64 years (1 of 1 - PPSV23)    DTaP/Tdap/Td series (1 - Tdap)    PAP AKA CERVICAL CYTOLOGY     Shingrix Vaccine Age 50> (1 of 2)    BREAST CANCER SCRN MAMMOGRAM     FOBT Q 1 YEAR AGE 54-65     Influenza Age 5 to Adult        Coordination of Care Questionnaire:  :   1) Have you been to an emergency room, urgent care, or hospitalized since your last visit? If yes, where when, and reason for visit? no     2. Have seen or consulted any other health care provider other than [address] [place] [address] since your last visit? If yes, where when, and reason for visit? NO    3) Do you have an Advanced Directive/ Living Will in place? NO  If yes, do we have a copy on file NO  If no, would you like information NO    Patient is accompanied by  I have received verbal consent from Brooklyn Castro to discuss any/all medical information while they are present in the room.     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, gave up    Hobbies playing word games, math, nature walks     Social Determinants of Health     Financial Resource Strain: Low Risk  (3/21/2024)    Overall Financial Resource Strain (CARDIA)     Difficulty of Paying Living Expenses: Not hard at all   Food Insecurity: No Food Insecurity (3/21/2024)    Hunger Vital Sign     Worried About Running Out of Food in the Last Year: Never true     Ran Out of Food in the Last Year: Never true   Transportation Needs: Unknown (3/21/2024)    PRAPARE - Transportation     Lack of Transportation (Non-Medical): No   Housing Stability: Unknown (3/21/2024)    Housing Stability Vital Sign     Unstable Housing in the Last Year: No       SCREENINGS           PHYSICAL EXAM    (up to 7 forlevel 4, 8 or more for level 5)     ED Triage Vitals   BP Systolic BP Percentile Diastolic BP Percentile Temp Temp Source Pulse Respirations SpO2   01/13/25 1905 -- -- 01/13/25 1906 01/13/25 1906 01/13/25 1905 01/13/25 1906 01/13/25 1906   (!) 188/106   98 °F (36.7 °C) Oral 98 26 99 %      Height Weight - Scale         01/13/25 1904 01/13/25 1904         1.575 m (5' 2\") 61.2 kg (135 lb)             Physical Exam  Vitals and nursing note reviewed.   Constitutional:       General: She is not in acute distress.     Appearance: Normal appearance. She is normal weight. She is not ill-appearing, toxic-appearing or diaphoretic.   HENT:      Head: Normocephalic and atraumatic.      Right Ear: Tympanic membrane, ear canal and external ear normal. There is no impacted cerumen.      Left Ear: Tympanic membrane, ear canal and external ear normal. There is no impacted cerumen.      Nose: Nose